# Patient Record
Sex: FEMALE | Race: WHITE | NOT HISPANIC OR LATINO | URBAN - METROPOLITAN AREA
[De-identification: names, ages, dates, MRNs, and addresses within clinical notes are randomized per-mention and may not be internally consistent; named-entity substitution may affect disease eponyms.]

---

## 2017-06-28 ENCOUNTER — OUTPATIENT (OUTPATIENT)
Dept: OUTPATIENT SERVICES | Facility: HOSPITAL | Age: 27
LOS: 1 days | Discharge: HOME | End: 2017-06-28

## 2017-06-28 ENCOUNTER — APPOINTMENT (OUTPATIENT)
Age: 27
End: 2017-06-28

## 2017-06-28 VITALS
HEIGHT: 62 IN | WEIGHT: 202 LBS | BODY MASS INDEX: 37.17 KG/M2 | DIASTOLIC BLOOD PRESSURE: 60 MMHG | SYSTOLIC BLOOD PRESSURE: 117 MMHG | HEART RATE: 67 BPM | TEMPERATURE: 97.9 F

## 2017-06-28 DIAGNOSIS — E03.9 HYPOTHYROIDISM, UNSPECIFIED: ICD-10-CM

## 2017-06-28 DIAGNOSIS — K44.9 DIAPHRAGMATIC HERNIA W/OUT OBSTRUCTION OR GANGRENE: ICD-10-CM

## 2017-06-28 DIAGNOSIS — R21 RASH AND OTHER NONSPECIFIC SKIN ERUPTION: ICD-10-CM

## 2017-06-28 DIAGNOSIS — E11.9 TYPE 2 DIABETES MELLITUS WITHOUT COMPLICATIONS: ICD-10-CM

## 2017-06-28 DIAGNOSIS — Z82.49 FAMILY HISTORY OF ISCHEMIC HEART DISEASE AND OTHER DISEASES OF THE CIRCULATORY SYSTEM: ICD-10-CM

## 2017-06-28 DIAGNOSIS — E66.9 OBESITY, UNSPECIFIED: ICD-10-CM

## 2017-06-28 DIAGNOSIS — Z23 ENCOUNTER FOR IMMUNIZATION: ICD-10-CM

## 2017-07-11 LAB
BASOPHILS # BLD: 0.04 TH/MM3
BASOPHILS NFR BLD: 0.6 %
CHOLEST SERPL-MCNC: 182 MG/DL
EOSINOPHIL # BLD: 0.14 TH/MM3
EOSINOPHIL NFR BLD: 2 %
ERYTHROCYTE [DISTWIDTH] IN BLOOD BY AUTOMATED COUNT: 12.2 %
ESTIMATED AVERAGE GLUCOSE (SOUTH): 137 MG/DL
GRANULOCYTES # BLD: 3.51 TH/MM3
GRANULOCYTES NFR BLD: 49.1 %
HBA1C MFR BLD: 6.4 %
HCT VFR BLD AUTO: 48.3 %
HDLC SERPL-MCNC: 33 MG/DL
HDLC SERPL: 5.5
HGB BLD-MCNC: 15.5 G/DL
IMM GRANULOCYTES # BLD: 0.01 TH/MM3
IMM GRANULOCYTES NFR BLD: 0.1 %
LDLC SERPL DIRECT ASSAY-MCNC: 121 MG/DL
LITHIUM SERPL-SCNC: 0.73 MMOL/L
LYMPHOCYTES # BLD: 2.64 TH/MM3
LYMPHOCYTES NFR BLD: 36.9 %
MCH RBC QN AUTO: 30.6 PG
MCHC RBC AUTO-ENTMCNC: 32.1 G/DL
MCV RBC AUTO: 95.5 FL
MONOCYTES # BLD: 0.81 TH/MM3
MONOCYTES NFR BLD: 11.3 %
PLATELET # BLD: 208 TH/MM3
PMV BLD AUTO: 11.4 FL
RBC # BLD AUTO: 5.06 ML/MM3
TRIGL SERPL-MCNC: 115 MG/DL
VALPROATE SERPL-MCNC: 73 UG/ML
VLDLC SERPL-MCNC: 23 MG/DL
WBC # BLD: 7.15 TH/MM3

## 2017-07-12 LAB
ALBUMIN SERPL-MCNC: 3.6 G/DL
ALBUMIN/GLOB SERPL: 1.03
ALP SERPL-CCNC: 42 IU/L
ALT SERPL-CCNC: 12 IU/L
ANION GAP SERPL CALC-SCNC: 7 MMOL/L
AST SERPL-CCNC: 17 IU/L
BILIRUB SERPL-MCNC: 0.6 MG/DL
BUN SERPL-MCNC: 17 MG/DL
BUN/CREAT SERPL: 15.6 %
CALCIUM SERPL-MCNC: 9.7 MG/DL
CHLORIDE SERPL-SCNC: 107 MMOL/L
CO2 SERPL-SCNC: 25 MMOL/L
CREAT SERPL-MCNC: 1.09 MG/DL
GFR SERPL CREATININE-BSD FRML MDRD: 60
GLUCOSE SERPL-MCNC: 128 MG/DL
POTASSIUM SERPL-SCNC: 4.1 MMOL/L
PROT SERPL-MCNC: 7.1 G/DL
SODIUM SERPL-SCNC: 139 MMOL/L

## 2017-07-13 ENCOUNTER — APPOINTMENT (OUTPATIENT)
Age: 27
End: 2017-07-13

## 2017-07-13 ENCOUNTER — OUTPATIENT (OUTPATIENT)
Dept: OUTPATIENT SERVICES | Facility: HOSPITAL | Age: 27
LOS: 1 days | Discharge: HOME | End: 2017-07-13

## 2017-07-13 DIAGNOSIS — R21 RASH AND OTHER NONSPECIFIC SKIN ERUPTION: ICD-10-CM

## 2017-07-13 DIAGNOSIS — E11.9 TYPE 2 DIABETES MELLITUS WITHOUT COMPLICATIONS: ICD-10-CM

## 2017-07-13 DIAGNOSIS — E03.9 HYPOTHYROIDISM, UNSPECIFIED: ICD-10-CM

## 2017-07-13 DIAGNOSIS — K59.09 OTHER CONSTIPATION: ICD-10-CM

## 2017-07-13 DIAGNOSIS — E66.9 OBESITY, UNSPECIFIED: ICD-10-CM

## 2017-07-13 LAB
ANA TITR SER: NEGATIVE
CRP SERPL-MCNC: < 0.2 MG/DL
HCV AB S/CO SERPL IA: 0.11 S/CO
HCV AB SER QL: NONREACTIVE
THYROID STIM HORM-HS 3RD GEN (SOUTH): 4.71 UIU/ML

## 2017-07-16 LAB — TOPIRAMATE SERPL-MCNC: 6.8 MCG/ML

## 2017-07-27 ENCOUNTER — OUTPATIENT (OUTPATIENT)
Dept: OUTPATIENT SERVICES | Facility: HOSPITAL | Age: 27
LOS: 1 days | Discharge: HOME | End: 2017-07-27

## 2017-07-27 ENCOUNTER — APPOINTMENT (OUTPATIENT)
Age: 27
End: 2017-07-27

## 2017-07-27 VITALS
SYSTOLIC BLOOD PRESSURE: 122 MMHG | BODY MASS INDEX: 36.44 KG/M2 | TEMPERATURE: 96.8 F | HEIGHT: 62 IN | HEART RATE: 68 BPM | DIASTOLIC BLOOD PRESSURE: 78 MMHG | WEIGHT: 198 LBS

## 2017-07-27 DIAGNOSIS — E11.9 TYPE 2 DIABETES MELLITUS WITHOUT COMPLICATIONS: ICD-10-CM

## 2017-07-27 DIAGNOSIS — E03.9 HYPOTHYROIDISM, UNSPECIFIED: ICD-10-CM

## 2017-07-27 DIAGNOSIS — E78.00 PURE HYPERCHOLESTEROLEMIA, UNSPECIFIED: ICD-10-CM

## 2017-09-25 ENCOUNTER — RX RENEWAL (OUTPATIENT)
Age: 27
End: 2017-09-25

## 2017-10-05 ENCOUNTER — APPOINTMENT (OUTPATIENT)
Dept: RHEUMATOLOGY | Facility: CLINIC | Age: 27
End: 2017-10-05

## 2017-10-05 ENCOUNTER — OUTPATIENT (OUTPATIENT)
Dept: OUTPATIENT SERVICES | Facility: HOSPITAL | Age: 27
LOS: 1 days | Discharge: HOME | End: 2017-10-05

## 2017-10-05 VITALS
DIASTOLIC BLOOD PRESSURE: 102 MMHG | WEIGHT: 192 LBS | SYSTOLIC BLOOD PRESSURE: 143 MMHG | HEIGHT: 62 IN | BODY MASS INDEX: 35.33 KG/M2 | HEART RATE: 75 BPM

## 2017-10-05 DIAGNOSIS — I73.00 RAYNAUD'S SYNDROME WITHOUT GANGRENE: ICD-10-CM

## 2017-10-11 ENCOUNTER — RESULT REVIEW (OUTPATIENT)
Age: 27
End: 2017-10-11

## 2017-10-11 ENCOUNTER — OUTPATIENT (OUTPATIENT)
Dept: OUTPATIENT SERVICES | Facility: HOSPITAL | Age: 27
LOS: 1 days | Discharge: HOME | End: 2017-10-11

## 2017-10-11 ENCOUNTER — APPOINTMENT (OUTPATIENT)
Age: 27
End: 2017-10-11

## 2017-10-11 VITALS
TEMPERATURE: 98.2 F | SYSTOLIC BLOOD PRESSURE: 138 MMHG | DIASTOLIC BLOOD PRESSURE: 82 MMHG | BODY MASS INDEX: 34.02 KG/M2 | WEIGHT: 192 LBS | HEART RATE: 68 BPM | HEIGHT: 63 IN

## 2017-10-11 DIAGNOSIS — E03.9 HYPOTHYROIDISM, UNSPECIFIED: ICD-10-CM

## 2017-10-11 DIAGNOSIS — K59.00 CONSTIPATION, UNSPECIFIED: ICD-10-CM

## 2017-10-11 DIAGNOSIS — E66.9 OBESITY, UNSPECIFIED: ICD-10-CM

## 2017-10-11 DIAGNOSIS — Z23 ENCOUNTER FOR IMMUNIZATION: ICD-10-CM

## 2017-10-11 DIAGNOSIS — R21 RASH AND OTHER NONSPECIFIC SKIN ERUPTION: ICD-10-CM

## 2017-10-11 DIAGNOSIS — E78.00 PURE HYPERCHOLESTEROLEMIA, UNSPECIFIED: ICD-10-CM

## 2017-10-11 DIAGNOSIS — E11.9 TYPE 2 DIABETES MELLITUS WITHOUT COMPLICATIONS: ICD-10-CM

## 2017-10-11 LAB
ALBUMIN SERPL-MCNC: 3.7 G/DL
ALBUMIN/GLOB SERPL: 1.23
ALP SERPL-CCNC: 50 IU/L
ALT SERPL-CCNC: 14 IU/L
ANION GAP SERPL CALC-SCNC: 7 MMOL/L
AST SERPL-CCNC: 14 IU/L
BILIRUB SERPL-MCNC: 0.5 MG/DL
BUN SERPL-MCNC: 10 MG/DL
BUN/CREAT SERPL: 10.9 %
CALCIUM SERPL-MCNC: 9.2 MG/DL
CHLORIDE SERPL-SCNC: 107 MMOL/L
CO2 SERPL-SCNC: 23 MMOL/L
CREAT SERPL-MCNC: 0.92 MG/DL
GFR SERPL CREATININE-BSD FRML MDRD: 73
GLUCOSE SERPL-MCNC: 138 MG/DL
POTASSIUM SERPL-SCNC: 4.2 MMOL/L
PROT SERPL-MCNC: 6.7 G/DL
SODIUM SERPL-SCNC: 137 MMOL/L

## 2017-10-12 ENCOUNTER — RX RENEWAL (OUTPATIENT)
Age: 27
End: 2017-10-12

## 2017-10-12 LAB
RHEUMATOID FACT SERPL-ACNC: < 14 IU/ML
THYROID STIM HORM-HS 3RD GEN (SOUTH): 0.01 UIU/ML

## 2017-10-25 ENCOUNTER — OUTPATIENT (OUTPATIENT)
Dept: OUTPATIENT SERVICES | Facility: HOSPITAL | Age: 27
LOS: 1 days | Discharge: HOME | End: 2017-10-25

## 2017-10-25 DIAGNOSIS — Z01.20 ENCOUNTER FOR DENTAL EXAMINATION AND CLEANING WITHOUT ABNORMAL FINDINGS: ICD-10-CM

## 2017-11-08 ENCOUNTER — APPOINTMENT (OUTPATIENT)
Dept: OBGYN | Facility: HOSPITAL | Age: 27
End: 2017-11-08

## 2017-11-08 ENCOUNTER — OUTPATIENT (OUTPATIENT)
Dept: OUTPATIENT SERVICES | Facility: HOSPITAL | Age: 27
LOS: 1 days | Discharge: HOME | End: 2017-11-08

## 2017-11-08 VITALS
DIASTOLIC BLOOD PRESSURE: 70 MMHG | HEIGHT: 63 IN | SYSTOLIC BLOOD PRESSURE: 118 MMHG | HEART RATE: 72 BPM | TEMPERATURE: 99.1 F | WEIGHT: 193 LBS | BODY MASS INDEX: 34.2 KG/M2

## 2017-11-09 DIAGNOSIS — Z01.419 ENCOUNTER FOR GYNECOLOGICAL EXAMINATION (GENERAL) (ROUTINE) WITHOUT ABNORMAL FINDINGS: ICD-10-CM

## 2017-11-11 ENCOUNTER — RESULT REVIEW (OUTPATIENT)
Age: 27
End: 2017-11-11

## 2017-12-05 ENCOUNTER — APPOINTMENT (OUTPATIENT)
Age: 27
End: 2017-12-05

## 2017-12-05 ENCOUNTER — OUTPATIENT (OUTPATIENT)
Dept: OUTPATIENT SERVICES | Facility: HOSPITAL | Age: 27
LOS: 1 days | Discharge: HOME | End: 2017-12-05

## 2017-12-05 VITALS
SYSTOLIC BLOOD PRESSURE: 116 MMHG | HEIGHT: 63 IN | HEART RATE: 64 BPM | WEIGHT: 184 LBS | BODY MASS INDEX: 32.6 KG/M2 | TEMPERATURE: 98.1 F | DIASTOLIC BLOOD PRESSURE: 70 MMHG

## 2017-12-05 DIAGNOSIS — R05 COUGH: ICD-10-CM

## 2017-12-05 RX ORDER — AZITHROMYCIN 250 MG/1
250 TABLET, FILM COATED ORAL
Qty: 6 | Refills: 0 | Status: COMPLETED | COMMUNITY
Start: 2017-12-05 | End: 2017-12-10

## 2017-12-07 ENCOUNTER — OUTPATIENT (OUTPATIENT)
Dept: OUTPATIENT SERVICES | Facility: HOSPITAL | Age: 27
LOS: 1 days | Discharge: HOME | End: 2017-12-07

## 2017-12-07 ENCOUNTER — APPOINTMENT (OUTPATIENT)
Dept: RHEUMATOLOGY | Facility: CLINIC | Age: 27
End: 2017-12-07

## 2017-12-07 VITALS
DIASTOLIC BLOOD PRESSURE: 89 MMHG | HEIGHT: 63 IN | WEIGHT: 183 LBS | BODY MASS INDEX: 32.43 KG/M2 | HEART RATE: 84 BPM | SYSTOLIC BLOOD PRESSURE: 116 MMHG

## 2017-12-14 ENCOUNTER — RX RENEWAL (OUTPATIENT)
Age: 27
End: 2017-12-14

## 2018-01-17 ENCOUNTER — OUTPATIENT (OUTPATIENT)
Dept: OUTPATIENT SERVICES | Facility: HOSPITAL | Age: 28
LOS: 1 days | Discharge: HOME | End: 2018-01-17

## 2018-01-17 ENCOUNTER — APPOINTMENT (OUTPATIENT)
Age: 28
End: 2018-01-17

## 2018-01-17 VITALS
HEIGHT: 62.5 IN | TEMPERATURE: 98.2 F | HEART RATE: 74 BPM | SYSTOLIC BLOOD PRESSURE: 100 MMHG | BODY MASS INDEX: 33.01 KG/M2 | WEIGHT: 184 LBS | DIASTOLIC BLOOD PRESSURE: 72 MMHG

## 2018-01-17 DIAGNOSIS — E78.00 PURE HYPERCHOLESTEROLEMIA, UNSPECIFIED: ICD-10-CM

## 2018-01-17 DIAGNOSIS — R05 COUGH: ICD-10-CM

## 2018-01-17 DIAGNOSIS — K59.00 CONSTIPATION, UNSPECIFIED: ICD-10-CM

## 2018-01-17 DIAGNOSIS — E03.9 HYPOTHYROIDISM, UNSPECIFIED: ICD-10-CM

## 2018-01-17 DIAGNOSIS — E11.9 TYPE 2 DIABETES MELLITUS WITHOUT COMPLICATIONS: ICD-10-CM

## 2018-01-17 DIAGNOSIS — E66.9 OBESITY, UNSPECIFIED: ICD-10-CM

## 2018-01-30 ENCOUNTER — OUTPATIENT (OUTPATIENT)
Dept: OUTPATIENT SERVICES | Facility: HOSPITAL | Age: 28
LOS: 1 days | Discharge: HOME | End: 2018-01-30

## 2018-01-31 DIAGNOSIS — H52.13 MYOPIA, BILATERAL: ICD-10-CM

## 2018-01-31 DIAGNOSIS — E11.9 TYPE 2 DIABETES MELLITUS WITHOUT COMPLICATIONS: ICD-10-CM

## 2018-02-14 LAB
ESTIMATED AVERAGE GLUCOSE: 117 MG/DL — HIGH (ref 68–114)
HBA1C BLD-MCNC: 5.7 % — HIGH (ref 4–5.6)

## 2018-02-15 LAB
BASOPHILS # BLD AUTO: 0.09 K/UL — SIGNIFICANT CHANGE UP (ref 0–0.2)
BASOPHILS NFR BLD AUTO: 1 % — SIGNIFICANT CHANGE UP (ref 0–2)
EOSINOPHIL # BLD AUTO: 0.2 K/UL — SIGNIFICANT CHANGE UP (ref 0–0.5)
EOSINOPHIL NFR BLD AUTO: 2.3 % — SIGNIFICANT CHANGE UP (ref 0–6)
HCT VFR BLD CALC: 50.5 % — HIGH (ref 34.5–45)
HGB BLD-MCNC: 15.7 G/DL — HIGH (ref 11.5–15.5)
IMM GRANULOCYTES NFR BLD AUTO: 0.3 % — SIGNIFICANT CHANGE UP (ref 0–1.5)
LYMPHOCYTES # BLD AUTO: 2.61 K/UL — SIGNIFICANT CHANGE UP (ref 1–3.3)
LYMPHOCYTES # BLD AUTO: 29.8 % — SIGNIFICANT CHANGE UP (ref 13–44)
MCHC RBC-ENTMCNC: 30.1 PG — SIGNIFICANT CHANGE UP (ref 27–34)
MCHC RBC-ENTMCNC: 31.1 GM/DL — LOW (ref 32–36)
MCV RBC AUTO: 96.7 FL — SIGNIFICANT CHANGE UP (ref 80–100)
MONOCYTES # BLD AUTO: 0.67 K/UL — SIGNIFICANT CHANGE UP (ref 0–0.9)
MONOCYTES NFR BLD AUTO: 7.7 % — SIGNIFICANT CHANGE UP (ref 2–14)
NEUTROPHILS # BLD AUTO: 5.15 K/UL — SIGNIFICANT CHANGE UP (ref 1.8–7.4)
NEUTROPHILS NFR BLD AUTO: 58.9 % — SIGNIFICANT CHANGE UP (ref 43–77)
PLATELET # BLD AUTO: 246 K/UL — SIGNIFICANT CHANGE UP (ref 150–400)
RBC # BLD: 5.22 M/UL — HIGH (ref 3.8–5.2)
RBC # FLD: 13.2 % — SIGNIFICANT CHANGE UP (ref 10.3–14.5)
TSH SERPL-MCNC: 0.19 UIU/ML — LOW (ref 0.27–4.2)
WBC # BLD: 8.75 K/UL — SIGNIFICANT CHANGE UP (ref 3.8–10.5)
WBC # FLD AUTO: 8.75 K/UL — SIGNIFICANT CHANGE UP (ref 3.8–10.5)

## 2018-02-16 LAB
ALBUMIN SERPL ELPH-MCNC: 4.4 G/DL — SIGNIFICANT CHANGE UP (ref 3.3–5)
ALP SERPL-CCNC: 63 U/L — SIGNIFICANT CHANGE UP (ref 40–120)
ALT FLD-CCNC: 9 U/L — LOW (ref 10–45)
ANION GAP SERPL CALC-SCNC: 18 MMOL/L — HIGH (ref 5–17)
AST SERPL-CCNC: 12 U/L — SIGNIFICANT CHANGE UP (ref 10–40)
BILIRUB SERPL-MCNC: 0.4 MG/DL — SIGNIFICANT CHANGE UP (ref 0.2–1.2)
BUN SERPL-MCNC: 12 MG/DL — SIGNIFICANT CHANGE UP (ref 7–23)
CALCIUM SERPL-MCNC: 10.1 MG/DL — SIGNIFICANT CHANGE UP (ref 8.4–10.5)
CHLORIDE SERPL-SCNC: 105 MMOL/L — SIGNIFICANT CHANGE UP (ref 96–108)
CO2 SERPL-SCNC: 20 MMOL/L — LOW (ref 22–31)
CREAT ?TM UR-MCNC: 24 MG/DL — SIGNIFICANT CHANGE UP
CREAT SERPL-MCNC: 0.88 MG/DL — SIGNIFICANT CHANGE UP (ref 0.5–1.3)
GLUCOSE SERPL-MCNC: 102 MG/DL — HIGH (ref 70–99)
MICROALBUMIN UR-MCNC: <0.3 MG/DL — SIGNIFICANT CHANGE UP
MICROALBUMIN/CREAT UR-RTO: SIGNIFICANT CHANGE UP (ref 0–30)
POTASSIUM SERPL-MCNC: 4.2 MMOL/L — SIGNIFICANT CHANGE UP (ref 3.5–5.3)
POTASSIUM SERPL-SCNC: 4.2 MMOL/L — SIGNIFICANT CHANGE UP (ref 3.5–5.3)
PROT SERPL-MCNC: 7.6 G/DL — SIGNIFICANT CHANGE UP (ref 6–8.3)
SODIUM SERPL-SCNC: 143 MMOL/L — SIGNIFICANT CHANGE UP (ref 135–145)

## 2018-03-08 ENCOUNTER — OUTPATIENT (OUTPATIENT)
Dept: OUTPATIENT SERVICES | Facility: HOSPITAL | Age: 28
LOS: 1 days | Discharge: HOME | End: 2018-03-08

## 2018-03-08 ENCOUNTER — APPOINTMENT (OUTPATIENT)
Age: 28
End: 2018-03-08

## 2018-03-08 VITALS
BODY MASS INDEX: 33.86 KG/M2 | SYSTOLIC BLOOD PRESSURE: 104 MMHG | WEIGHT: 184 LBS | HEIGHT: 62 IN | HEART RATE: 64 BPM | TEMPERATURE: 99.1 F | DIASTOLIC BLOOD PRESSURE: 76 MMHG

## 2018-03-08 DIAGNOSIS — E78.00 PURE HYPERCHOLESTEROLEMIA, UNSPECIFIED: ICD-10-CM

## 2018-03-08 DIAGNOSIS — E03.9 HYPOTHYROIDISM, UNSPECIFIED: ICD-10-CM

## 2018-03-08 DIAGNOSIS — E11.9 TYPE 2 DIABETES MELLITUS WITHOUT COMPLICATIONS: ICD-10-CM

## 2018-03-08 DIAGNOSIS — E66.9 OBESITY, UNSPECIFIED: ICD-10-CM

## 2018-03-08 RX ORDER — DAPAGLIFLOZIN AND METFORMIN HYDROCHLORIDE 5; 1000 MG/1; MG/1
5-1000 TABLET, FILM COATED, EXTENDED RELEASE ORAL
Qty: 30 | Refills: 2 | Status: DISCONTINUED | COMMUNITY
End: 2018-03-08

## 2018-03-29 LAB
ESTIMATED AVERAGE GLUCOSE: 123 MG/DL
HBA1C MFR BLD HPLC: 5.9 %

## 2018-04-01 LAB
CHOLEST SERPL-MCNC: 140 MG/DL
CHOLEST/HDLC SERPL: 3.3 RATIO
HDLC SERPL-MCNC: 42 MG/DL
LDLC SERPL CALC-MCNC: 88 MG/DL
TRIGL SERPL-MCNC: 78 MG/DL
TSH SERPL-ACNC: 2.46 UIU/ML

## 2018-05-07 ENCOUNTER — OUTPATIENT (OUTPATIENT)
Dept: OUTPATIENT SERVICES | Facility: HOSPITAL | Age: 28
LOS: 1 days | Discharge: HOME | End: 2018-05-07

## 2018-05-07 DIAGNOSIS — E78.2 MIXED HYPERLIPIDEMIA: ICD-10-CM

## 2018-05-07 DIAGNOSIS — E53.8 DEFICIENCY OF OTHER SPECIFIED B GROUP VITAMINS: ICD-10-CM

## 2018-05-07 DIAGNOSIS — I10 ESSENTIAL (PRIMARY) HYPERTENSION: ICD-10-CM

## 2018-05-07 DIAGNOSIS — D53.9 NUTRITIONAL ANEMIA, UNSPECIFIED: ICD-10-CM

## 2018-05-07 DIAGNOSIS — E11.65 TYPE 2 DIABETES MELLITUS WITH HYPERGLYCEMIA: ICD-10-CM

## 2018-05-07 DIAGNOSIS — E66.09 OTHER OBESITY DUE TO EXCESS CALORIES: ICD-10-CM

## 2018-05-07 DIAGNOSIS — E06.3 AUTOIMMUNE THYROIDITIS: ICD-10-CM

## 2018-05-07 DIAGNOSIS — E55.9 VITAMIN D DEFICIENCY, UNSPECIFIED: ICD-10-CM

## 2018-05-29 ENCOUNTER — RX RENEWAL (OUTPATIENT)
Age: 28
End: 2018-05-29

## 2018-06-21 ENCOUNTER — RX RENEWAL (OUTPATIENT)
Age: 28
End: 2018-06-21

## 2018-06-27 ENCOUNTER — RX RENEWAL (OUTPATIENT)
Age: 28
End: 2018-06-27

## 2018-07-17 ENCOUNTER — OUTPATIENT (OUTPATIENT)
Dept: OUTPATIENT SERVICES | Facility: HOSPITAL | Age: 28
LOS: 1 days | Discharge: HOME | End: 2018-07-17

## 2018-07-17 ENCOUNTER — APPOINTMENT (OUTPATIENT)
Age: 28
End: 2018-07-17

## 2018-07-17 VITALS
HEART RATE: 72 BPM | HEIGHT: 63 IN | WEIGHT: 182 LBS | DIASTOLIC BLOOD PRESSURE: 74 MMHG | SYSTOLIC BLOOD PRESSURE: 100 MMHG | TEMPERATURE: 98.6 F | BODY MASS INDEX: 32.25 KG/M2

## 2018-07-17 DIAGNOSIS — Z00.00 ENCOUNTER FOR GENERAL ADULT MEDICAL EXAMINATION WITHOUT ABNORMAL FINDINGS: ICD-10-CM

## 2018-07-17 DIAGNOSIS — E03.9 HYPOTHYROIDISM, UNSPECIFIED: ICD-10-CM

## 2018-07-17 DIAGNOSIS — E78.00 PURE HYPERCHOLESTEROLEMIA, UNSPECIFIED: ICD-10-CM

## 2018-07-17 DIAGNOSIS — K59.00 CONSTIPATION, UNSPECIFIED: ICD-10-CM

## 2018-07-17 DIAGNOSIS — E66.9 OBESITY, UNSPECIFIED: ICD-10-CM

## 2018-07-17 DIAGNOSIS — E11.9 TYPE 2 DIABETES MELLITUS WITHOUT COMPLICATIONS: ICD-10-CM

## 2018-07-17 NOTE — PHYSICAL EXAM
[No Acute Distress] : no acute distress [Well Nourished] : well nourished [Well Developed] : well developed [Well-Appearing] : well-appearing [Normal Sclera/Conjunctiva] : normal sclera/conjunctiva [PERRL] : pupils equal round and reactive to light [EOMI] : extraocular movements intact [Normal Outer Ear/Nose] : the outer ears and nose were normal in appearance [Normal Oropharynx] : the oropharynx was normal [Normal TMs] : both tympanic membranes were normal [Normal Nasal Mucosa] : the nasal mucosa was normal [No JVD] : no jugular venous distention [Supple] : supple [No Lymphadenopathy] : no lymphadenopathy [No Respiratory Distress] : no respiratory distress  [Clear to Auscultation] : lungs were clear to auscultation bilaterally [No Accessory Muscle Use] : no accessory muscle use [Normal Rate] : normal rate  [Regular Rhythm] : with a regular rhythm [Normal S1, S2] : normal S1 and S2 [No Murmur] : no murmur heard [No Carotid Bruits] : no carotid bruits [No Abdominal Bruit] : a ~M bruit was not heard ~T in the abdomen [Pedal Pulses Present] : the pedal pulses are present [No Extremity Clubbing/Cyanosis] : no extremity clubbing/cyanosis [No Palpable Aorta] : no palpable aorta [Soft] : abdomen soft [Non Tender] : non-tender [Non-distended] : non-distended [No Masses] : no abdominal mass palpated [No HSM] : no HSM [Normal Bowel Sounds] : normal bowel sounds [Normal Posterior Cervical Nodes] : no posterior cervical lymphadenopathy [Normal Anterior Cervical Nodes] : no anterior cervical lymphadenopathy [No CVA Tenderness] : no CVA  tenderness [No Spinal Tenderness] : no spinal tenderness [No Joint Swelling] : no joint swelling [Grossly Normal Strength/Tone] : grossly normal strength/tone [No Rash] : no rash [No Skin Lesions] : no skin lesions [Normal Gait] : normal gait [Acne] : no acne [de-identified] : obese abdomen [de-identified] : unable to exam

## 2018-07-17 NOTE — COUNSELING
[Weight management counseling provided] : Weight management [Healthy eating counseling provided] : healthy eating [Activity counseling provided] : activity [Low Fat Diet] : Low fat diet [Decrease Portions] : Decrease food portions [Low Salt Diet] : Low salt diet [Walking] : Walking [de-identified] : 1500 calories 2 gm, low fat/chol. high fiber ADA diet

## 2018-07-17 NOTE — HEALTH RISK ASSESSMENT
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

## 2018-07-17 NOTE — ASSESSMENT
[FreeTextEntry1] : 1.	Diet: 1500 calories 2 gm, low fat/chol. high fiber ADA diet\par 2.	Annual ophthal./optometry evaluation\par 3.	Annual dental evaluation\par 4.	Annual Ob/Gyn evaluation done 11/8/17, and pap smear done 11/11/17\par 5.	fasting CMP, CBC, lipid profile, TSH, valproic acid, lithium, HgA1C, topiramate\par 6.	urine microalbumin\par 7.	EKG ordered\par 8.	Pt. is a non-smoker, BMI 32.24, followed by dietitian at group home\par 9.	Had Tdap 6/28/17, flu vaccine 10/11/17\par 10.	Depression screening: unable to assess pt. for depression due to pt’s cognitive impairment, followed by psych.\par 11.	Fall screening: no report fall for past year per staff, no unsteady/imbalance gait\par 12.	NIDDM/obesity: HgA1C 5.9 on lantus, metformin, nuedexta\par a.  continue med. and diet\par b.  weight loss, exercise as tolerated\par c.  followed dietitian at group home as scheduled\par d.  seen optometry 1/30/18\par e.  follow endo Dr. Zamudio as scheduled\par f.  follow up visit in 3 months\par 13.	Hypothyroidism: TSH 2.46 on levothyroxine 25mcg daily\par a.  continue current dose of levothyroxine\par 14.	Hypercholesterolemia: LDL 88, triglyceride 78 on pravastatin\par a.  continue pravastatin\par 15.	Constipation: on colace, miralax\par a.  hydration\par b.  high fiber diet\par c.  continue current med.\par 16.	Above plan was discussed with group home staff

## 2018-07-17 NOTE — HISTORY OF PRESENT ILLNESS
[Other: _____] : [unfilled] [FreeTextEntry1] : Pt. is a 29yo female, here for annual physical exam.  Pt. unable to provide any history or complaint.  No issues reported by staff.

## 2018-08-03 LAB
BASOPHILS # BLD AUTO: 0.08 K/UL
BASOPHILS NFR BLD AUTO: 1 %
EOSINOPHIL # BLD AUTO: 0.14 K/UL
EOSINOPHIL NFR BLD AUTO: 1.7 %
ESTIMATED AVERAGE GLUCOSE: 120 MG/DL
HBA1C MFR BLD HPLC: 5.8 %
HCT VFR BLD CALC: 45.9 %
HGB BLD-MCNC: 14.8 G/DL
IMM GRANULOCYTES NFR BLD AUTO: 0.6 %
LITHIUM SERPL-SCNC: 0.71 MMOL/L
LYMPHOCYTES # BLD AUTO: 2.4 K/UL
LYMPHOCYTES NFR BLD AUTO: 29.7 %
MAN DIFF?: NORMAL
MCHC RBC-ENTMCNC: 29.7 PG
MCHC RBC-ENTMCNC: 32.2 G/DL
MCV RBC AUTO: 92 FL
MONOCYTES # BLD AUTO: 0.66 K/UL
MONOCYTES NFR BLD AUTO: 8.2 %
NEUTROPHILS # BLD AUTO: 4.76 K/UL
NEUTROPHILS NFR BLD AUTO: 58.8 %
PLATELET # BLD AUTO: 188 K/UL
RBC # BLD: 4.99 M/UL
RBC # FLD: 12.1 %
VALPROATE SERPL-MCNC: 45 UG/ML
WBC # FLD AUTO: 8.09 K/UL

## 2018-08-06 LAB
ALBUMIN SERPL ELPH-MCNC: 4.3 G/DL
ALP BLD-CCNC: 44 U/L
ALT SERPL-CCNC: 11 U/L
ANION GAP SERPL CALC-SCNC: 17 MMOL/L
AST SERPL-CCNC: 12 U/L
BILIRUB SERPL-MCNC: 0.3 MG/DL
BUN SERPL-MCNC: 10 MG/DL
CALCIUM SERPL-MCNC: 9.3 MG/DL
CHLORIDE SERPL-SCNC: 106 MMOL/L
CHOLEST SERPL-MCNC: 117 MG/DL
CHOLEST/HDLC SERPL: 3 RATIO
CO2 SERPL-SCNC: 21 MMOL/L
CREAT SERPL-MCNC: 0.9 MG/DL
GLUCOSE SERPL-MCNC: 94 MG/DL
HDLC SERPL-MCNC: 39 MG/DL
LDLC SERPL CALC-MCNC: 69 MG/DL
POTASSIUM SERPL-SCNC: 4.3 MMOL/L
PROT SERPL-MCNC: 6.9 G/DL
SODIUM SERPL-SCNC: 144 MMOL/L
TOPIRAMATE SERPL-MCNC: 6.9 MCG/ML
TRIGL SERPL-MCNC: 73 MG/DL
TSH SERPL-ACNC: 3.96 UIU/ML

## 2018-09-25 ENCOUNTER — RX RENEWAL (OUTPATIENT)
Age: 28
End: 2018-09-25

## 2018-10-18 ENCOUNTER — RX RENEWAL (OUTPATIENT)
Age: 28
End: 2018-10-18

## 2018-10-25 ENCOUNTER — APPOINTMENT (OUTPATIENT)
Age: 28
End: 2018-10-25

## 2018-11-01 ENCOUNTER — APPOINTMENT (OUTPATIENT)
Age: 28
End: 2018-11-01

## 2018-11-01 ENCOUNTER — OUTPATIENT (OUTPATIENT)
Dept: OUTPATIENT SERVICES | Facility: HOSPITAL | Age: 28
LOS: 1 days | Discharge: HOME | End: 2018-11-01

## 2018-11-01 VITALS
DIASTOLIC BLOOD PRESSURE: 84 MMHG | HEIGHT: 63 IN | TEMPERATURE: 97.8 F | SYSTOLIC BLOOD PRESSURE: 110 MMHG | WEIGHT: 176 LBS | BODY MASS INDEX: 31.18 KG/M2 | HEART RATE: 76 BPM

## 2018-11-01 DIAGNOSIS — K59.00 CONSTIPATION, UNSPECIFIED: ICD-10-CM

## 2018-11-01 DIAGNOSIS — E03.9 HYPOTHYROIDISM, UNSPECIFIED: ICD-10-CM

## 2018-11-01 DIAGNOSIS — E11.9 TYPE 2 DIABETES MELLITUS WITHOUT COMPLICATIONS: ICD-10-CM

## 2018-11-01 DIAGNOSIS — Z23 ENCOUNTER FOR IMMUNIZATION: ICD-10-CM

## 2018-11-01 DIAGNOSIS — E78.00 PURE HYPERCHOLESTEROLEMIA, UNSPECIFIED: ICD-10-CM

## 2018-11-01 DIAGNOSIS — E66.9 OBESITY, UNSPECIFIED: ICD-10-CM

## 2018-11-01 NOTE — PHYSICAL EXAM
[No Respiratory Distress] : no respiratory distress  [Clear to Auscultation] : lungs were clear to auscultation bilaterally [No Accessory Muscle Use] : no accessory muscle use [Normal Rate] : normal rate  [Regular Rhythm] : with a regular rhythm [Normal S1, S2] : normal S1 and S2 [No Carotid Bruits] : no carotid bruits [No Abdominal Bruit] : a ~M bruit was not heard ~T in the abdomen [Pedal Pulses Present] : the pedal pulses are present [No Edema] : there was no peripheral edema [No Extremity Clubbing/Cyanosis] : no extremity clubbing/cyanosis [No Palpable Aorta] : no palpable aorta [Soft] : abdomen soft [Non Tender] : non-tender [No Masses] : no abdominal mass palpated [No HSM] : no HSM [Normal Bowel Sounds] : normal bowel sounds [No Hernias] : no hernias [No Joint Swelling] : no joint swelling [Grossly Normal Strength/Tone] : grossly normal strength/tone [de-identified] : Pt. screaming, and grabbing people with her hands [de-identified] : obese abdomen

## 2018-11-01 NOTE — ASSESSMENT
[FreeTextEntry1] : 01.  NIDDM/obesity: HgA1C 5.8 on lantus, nuedexta and metformin\par a.  continue current med. and diet\par b.  weight loss, exercise as tolerated\par c.  follow endo as scheduled\par 02.  Hypothyroidism: TSH 3.96 on levothyroxine 25mcg daily\par a.  continue levothyroxine\par b.  follow up visit in 3 months\par 03.  Hypercholesterolemia: LDL 69, triglyceride 73 on pravastatin\par a.  continue pravastatin\par 04.  Constipation: stable on colace, polyethylene\par a.  continue current med.\par b.  high fiber diet\par c.  hydration\par 05.  HCM\par a.  flu vaccine given

## 2018-11-01 NOTE — HISTORY OF PRESENT ILLNESS
[Other: _____] : [unfilled] [FreeTextEntry1] : Pt. is a 27yo female, here for follow up and medication renewal.  Pt. has behavior issues and is followed by psych.

## 2018-11-01 NOTE — COUNSELING
[Weight management counseling provided] : Weight management [Healthy eating counseling provided] : healthy eating [Activity counseling provided] : activity [Low Fat Diet] : Low fat diet [Decrease Portions] : Decrease food portions [Low Salt Diet] : Low salt diet [Walking] : Walking [de-identified] : 1500 calories 2 gm, low fat/chol. high fiber ADA diet

## 2018-11-05 LAB
CREAT SPEC-SCNC: 19 MG/DL
MICROALBUMIN 24H UR DL<=1MG/L-MCNC: <1.2 MG/DL
MICROALBUMIN/CREAT 24H UR-RTO: NORMAL

## 2018-12-05 ENCOUNTER — APPOINTMENT (OUTPATIENT)
Dept: OBGYN | Facility: HOSPITAL | Age: 28
End: 2018-12-05

## 2018-12-05 ENCOUNTER — OUTPATIENT (OUTPATIENT)
Dept: OUTPATIENT SERVICES | Facility: HOSPITAL | Age: 28
LOS: 1 days | Discharge: HOME | End: 2018-12-05

## 2018-12-05 VITALS
TEMPERATURE: 99 F | DIASTOLIC BLOOD PRESSURE: 74 MMHG | WEIGHT: 172 LBS | SYSTOLIC BLOOD PRESSURE: 100 MMHG | BODY MASS INDEX: 30.48 KG/M2 | HEIGHT: 63 IN | HEART RATE: 64 BPM

## 2018-12-20 ENCOUNTER — OUTPATIENT (OUTPATIENT)
Dept: OUTPATIENT SERVICES | Facility: HOSPITAL | Age: 28
LOS: 1 days | Discharge: HOME | End: 2018-12-20

## 2018-12-20 DIAGNOSIS — Z01.21 ENCOUNTER FOR DENTAL EXAMINATION AND CLEANING WITH ABNORMAL FINDINGS: ICD-10-CM

## 2019-01-29 ENCOUNTER — RX RENEWAL (OUTPATIENT)
Age: 29
End: 2019-01-29

## 2019-02-06 ENCOUNTER — OUTPATIENT (OUTPATIENT)
Dept: OUTPATIENT SERVICES | Facility: HOSPITAL | Age: 29
LOS: 1 days | Discharge: HOME | End: 2019-02-06

## 2019-02-06 ENCOUNTER — APPOINTMENT (OUTPATIENT)
Age: 29
End: 2019-02-06

## 2019-02-06 VITALS
SYSTOLIC BLOOD PRESSURE: 102 MMHG | WEIGHT: 165 LBS | HEART RATE: 72 BPM | BODY MASS INDEX: 29.23 KG/M2 | DIASTOLIC BLOOD PRESSURE: 70 MMHG | HEIGHT: 63 IN

## 2019-02-06 DIAGNOSIS — K59.00 CONSTIPATION, UNSPECIFIED: ICD-10-CM

## 2019-02-06 DIAGNOSIS — E11.9 TYPE 2 DIABETES MELLITUS WITHOUT COMPLICATIONS: ICD-10-CM

## 2019-02-06 DIAGNOSIS — E78.00 PURE HYPERCHOLESTEROLEMIA, UNSPECIFIED: ICD-10-CM

## 2019-02-06 DIAGNOSIS — E66.9 OBESITY, UNSPECIFIED: ICD-10-CM

## 2019-02-06 DIAGNOSIS — E03.9 HYPOTHYROIDISM, UNSPECIFIED: ICD-10-CM

## 2019-02-06 RX ORDER — DIVALPROEX SODIUM 250 MG/1
250 TABLET, DELAYED RELEASE ORAL
Refills: 0 | Status: DISCONTINUED | COMMUNITY
End: 2019-02-06

## 2019-02-06 RX ORDER — INSULIN GLARGINE 100 [IU]/ML
100 INJECTION, SOLUTION SUBCUTANEOUS
Qty: 1 | Refills: 2 | Status: DISCONTINUED | COMMUNITY
End: 2019-02-06

## 2019-02-06 NOTE — PHYSICAL EXAM
[No Respiratory Distress] : no respiratory distress  [Clear to Auscultation] : lungs were clear to auscultation bilaterally [No Accessory Muscle Use] : no accessory muscle use [Normal Rate] : normal rate  [Regular Rhythm] : with a regular rhythm [Normal S1, S2] : normal S1 and S2 [No Carotid Bruits] : no carotid bruits [No Abdominal Bruit] : a ~M bruit was not heard ~T in the abdomen [Pedal Pulses Present] : the pedal pulses are present [No Edema] : there was no peripheral edema [No Extremity Clubbing/Cyanosis] : no extremity clubbing/cyanosis [No Palpable Aorta] : no palpable aorta [Soft] : abdomen soft [Non Tender] : non-tender [No Masses] : no abdominal mass palpated [No HSM] : no HSM [Normal Bowel Sounds] : normal bowel sounds [No Hernias] : no hernias [No Joint Swelling] : no joint swelling [Grossly Normal Strength/Tone] : grossly normal strength/tone [de-identified] : obese abdomen

## 2019-02-06 NOTE — COUNSELING
[Weight management counseling provided] : Weight management [Healthy eating counseling provided] : healthy eating [Activity counseling provided] : activity [Low Fat Diet] : Low fat diet [Decrease Portions] : Decrease food portions [Low Salt Diet] : Low salt diet [Walking] : Walking [de-identified] : 1500 calories 2 gm, low fat/chol. high fiber ADA diet

## 2019-02-06 NOTE — ASSESSMENT
[FreeTextEntry1] : 01.  NIDDM/obesity: on metformin, januvia; lost 7 lbs since 12/5/18\par a.  continue current meds.\par b.  weight loss, exercise as tolerated\par c.  optometry follow up\par d.  fasting CMP, CBC, lipid profile, HgA1C, TSH\par e.  urine microalbumin\par f.  follow up visit in 3 months\par 02.  Hypothyroidism: on levothyroxine\par a.  continue current dose of levothyroxine\par 03.  Hypercholesterolemia: on pravastatin\par a.  continue pravastatin\par 04.  Constipation: stable on colace and miralax\par a.  hydration\par b.  high fiber diet\par c.  continue miralax, and colace

## 2019-02-06 NOTE — HISTORY OF PRESENT ILLNESS
[Other: _____] : [unfilled] [FreeTextEntry1] : Pt. is a 28yo female, here for followup hypothyroidism, diabetes mellitus, hypercholesterolemia and constipation.  Pt. unable to provide any history or complaint.  Other than pt's behavior, no issues reported by staff.  Pt. was evaluated by psych and endocrinologist, psych meds. were adjusted, and endo added januvia to her regimen

## 2019-02-26 ENCOUNTER — OUTPATIENT (OUTPATIENT)
Dept: OUTPATIENT SERVICES | Facility: HOSPITAL | Age: 29
LOS: 1 days | Discharge: HOME | End: 2019-02-26

## 2019-02-26 DIAGNOSIS — H52.13 MYOPIA, BILATERAL: ICD-10-CM

## 2019-02-26 DIAGNOSIS — E11.9 TYPE 2 DIABETES MELLITUS WITHOUT COMPLICATIONS: ICD-10-CM

## 2019-03-11 ENCOUNTER — OUTPATIENT (OUTPATIENT)
Dept: OUTPATIENT SERVICES | Facility: HOSPITAL | Age: 29
LOS: 1 days | Discharge: HOME | End: 2019-03-11

## 2019-03-11 DIAGNOSIS — Z00.00 ENCOUNTER FOR GENERAL ADULT MEDICAL EXAMINATION WITHOUT ABNORMAL FINDINGS: ICD-10-CM

## 2019-03-12 LAB
ALBUMIN SERPL ELPH-MCNC: 4.3 G/DL
ALP BLD-CCNC: 46 U/L
ALT SERPL-CCNC: 10 U/L
ANION GAP SERPL CALC-SCNC: 13 MMOL/L
AST SERPL-CCNC: 13 U/L
BASOPHILS # BLD AUTO: 0.08 K/UL
BASOPHILS NFR BLD AUTO: 1 %
BILIRUB SERPL-MCNC: 0.4 MG/DL
BUN SERPL-MCNC: 15 MG/DL
CALCIUM SERPL-MCNC: 9.1 MG/DL
CHLORIDE SERPL-SCNC: 107 MMOL/L
CHOLEST SERPL-MCNC: 121 MG/DL
CHOLEST/HDLC SERPL: 3.6 RATIO
CO2 SERPL-SCNC: 22 MMOL/L
CREAT SERPL-MCNC: 0.9 MG/DL
EOSINOPHIL # BLD AUTO: 0.16 K/UL
EOSINOPHIL NFR BLD AUTO: 2.1 %
ESTIMATED AVERAGE GLUCOSE: 117 MG/DL
GLUCOSE SERPL-MCNC: 121 MG/DL
HBA1C MFR BLD HPLC: 5.7 %
HCT VFR BLD CALC: 45.4 %
HDLC SERPL-MCNC: 34 MG/DL
HGB BLD-MCNC: 14.5 G/DL
IMM GRANULOCYTES NFR BLD AUTO: 0.7 %
LDLC SERPL CALC-MCNC: 76 MG/DL
LYMPHOCYTES # BLD AUTO: 2.18 K/UL
LYMPHOCYTES NFR BLD AUTO: 28.6 %
MAN DIFF?: NORMAL
MCHC RBC-ENTMCNC: 30.3 PG
MCHC RBC-ENTMCNC: 31.9 G/DL
MCV RBC AUTO: 94.8 FL
MONOCYTES # BLD AUTO: 0.7 K/UL
MONOCYTES NFR BLD AUTO: 9.2 %
NEUTROPHILS # BLD AUTO: 4.45 K/UL
NEUTROPHILS NFR BLD AUTO: 58.4 %
PLATELET # BLD AUTO: 201 K/UL
POTASSIUM SERPL-SCNC: 4.5 MMOL/L
PROT SERPL-MCNC: 6.9 G/DL
RBC # BLD: 4.79 M/UL
RBC # FLD: 12.1 %
SODIUM SERPL-SCNC: 142 MMOL/L
TRIGL SERPL-MCNC: 68 MG/DL
TSH SERPL-ACNC: 3.79 UIU/ML
WBC # FLD AUTO: 7.62 K/UL

## 2019-05-21 ENCOUNTER — OUTPATIENT (OUTPATIENT)
Dept: OUTPATIENT SERVICES | Facility: HOSPITAL | Age: 29
LOS: 1 days | Discharge: HOME | End: 2019-05-21

## 2019-05-21 ENCOUNTER — APPOINTMENT (OUTPATIENT)
Age: 29
End: 2019-05-21

## 2019-05-21 VITALS
SYSTOLIC BLOOD PRESSURE: 102 MMHG | HEIGHT: 63 IN | DIASTOLIC BLOOD PRESSURE: 74 MMHG | RESPIRATION RATE: 16 BRPM | HEART RATE: 72 BPM | TEMPERATURE: 97.7 F | WEIGHT: 164 LBS | BODY MASS INDEX: 29.06 KG/M2

## 2019-05-21 DIAGNOSIS — E11.9 TYPE 2 DIABETES MELLITUS WITHOUT COMPLICATIONS: ICD-10-CM

## 2019-05-21 DIAGNOSIS — E78.00 PURE HYPERCHOLESTEROLEMIA, UNSPECIFIED: ICD-10-CM

## 2019-05-21 DIAGNOSIS — K59.00 CONSTIPATION, UNSPECIFIED: ICD-10-CM

## 2019-05-21 DIAGNOSIS — E66.9 OBESITY, UNSPECIFIED: ICD-10-CM

## 2019-05-21 DIAGNOSIS — E03.9 HYPOTHYROIDISM, UNSPECIFIED: ICD-10-CM

## 2019-05-21 NOTE — PHYSICAL EXAM
[No Respiratory Distress] : no respiratory distress  [Clear to Auscultation] : lungs were clear to auscultation bilaterally [No Accessory Muscle Use] : no accessory muscle use [Normal Rate] : normal rate  [Regular Rhythm] : with a regular rhythm [Normal S1, S2] : normal S1 and S2 [No Carotid Bruits] : no carotid bruits [No Abdominal Bruit] : a ~M bruit was not heard ~T in the abdomen [Pedal Pulses Present] : the pedal pulses are present [No Edema] : there was no peripheral edema [No Extremity Clubbing/Cyanosis] : no extremity clubbing/cyanosis [No Palpable Aorta] : no palpable aorta [Soft] : abdomen soft [Non Tender] : non-tender [No Masses] : no abdominal mass palpated [No HSM] : no HSM [Normal Bowel Sounds] : normal bowel sounds [No Hernias] : no hernias [No Joint Swelling] : no joint swelling [Grossly Normal Strength/Tone] : grossly normal strength/tone [de-identified] : obese abdomen

## 2019-05-21 NOTE — COUNSELING
[Weight management counseling provided] : Weight management [Healthy eating counseling provided] : healthy eating [Activity counseling provided] : activity [Low Fat Diet] : Low fat diet [Low Salt Diet] : Low salt diet [Decrease Portions] : Decrease food portions [Walking] : Walking [de-identified] : 1500 calories 2 gm, low fat/chol. high fiber ADA diet

## 2019-05-21 NOTE — HISTORY OF PRESENT ILLNESS
[Other: _____] : [unfilled] [FreeTextEntry1] : Pt. is a 28yo female, here for follow up NIDDM, hypothyroidism, hypercholesterolemia, constipation.  Pt. unable to provide any history or complaint.  No issues reported by staff.  Pt. seen endo, and levothyroxine dose increase to 75mcg daily as per group home RN, Deepti Alonzo

## 2019-06-07 ENCOUNTER — RECORD ABSTRACTING (OUTPATIENT)
Age: 29
End: 2019-06-07

## 2019-06-07 DIAGNOSIS — Z82.5 FAMILY HISTORY OF ASTHMA AND OTHER CHRONIC LOWER RESPIRATORY DISEASES: ICD-10-CM

## 2019-06-07 DIAGNOSIS — Z82.49 FAMILY HISTORY OF ISCHEMIC HEART DISEASE AND OTHER DISEASES OF THE CIRCULATORY SYSTEM: ICD-10-CM

## 2019-06-24 ENCOUNTER — OUTPATIENT (OUTPATIENT)
Dept: OUTPATIENT SERVICES | Facility: HOSPITAL | Age: 29
LOS: 1 days | Discharge: HOME | End: 2019-06-24

## 2019-06-24 DIAGNOSIS — Z01.20 ENCOUNTER FOR DENTAL EXAMINATION AND CLEANING WITHOUT ABNORMAL FINDINGS: ICD-10-CM

## 2019-07-11 ENCOUNTER — APPOINTMENT (OUTPATIENT)
Dept: ENDOCRINOLOGY | Facility: CLINIC | Age: 29
End: 2019-07-11
Payer: COMMERCIAL

## 2019-07-11 PROCEDURE — 99214 OFFICE O/P EST MOD 30 MIN: CPT

## 2019-07-11 NOTE — PHYSICAL EXAM
[Alert] : alert [No Acute Distress] : no acute distress [Well Developed] : well developed [Well Nourished] : well nourished [Healthy Appearance] : healthy appearance [Normal Sclera/Conjunctiva] : normal sclera/conjunctiva [EOMI] : extra ocular movement intact [PERRL] : pupils equal, round and reactive to light [Normal Outer Ear/Nose] : the ears and nose were normal in appearance [No Proptosis] : no proptosis [Normal Hearing] : hearing was normal [Normal Nasal Mucosa] : the nasal mucosa was normal [Normal Oropharynx] : the oropharynx was normal [Supple] : the neck was supple [No Neck Mass] : no neck mass was observed [Thyroid Not Enlarged] : the thyroid was not enlarged [No Thyroid Nodules] : there were no palpable thyroid nodules [No Respiratory Distress] : no respiratory distress [Clear to Auscultation] : lungs were clear to auscultation bilaterally [No Accessory Muscle Use] : no accessory muscle use [Normal Palpation] : palpation of the chest revealed no abnormalities [Normal to Percussion] : the lungs were normal to percussion [Normal Rate] : heart rate was normal  [Regular Rhythm] : with a regular rhythm [Normal S1, S2] : normal S1 and S2 [No Edema] : there was no peripheral edema [Pedal Pulses Normal] : the pedal pulses are present [Abdominal Aorta Normal] : the abdominal aorta was normal [Normal Bowel Sounds] : normal bowel sounds [Not Tender] : non-tender [Not Distended] : not distended [Post Cervical Nodes] : posterior cervical nodes [Soft] : abdomen soft [Axillary Nodes] : axillary nodes [Normal] : normal and non tender [Anterior Cervical Nodes] : anterior cervical nodes [No Spinal Tenderness] : no spinal tenderness [Spine Straight] : spine straight [Normal Gait] : normal gait [No Stigmata of Cushings Syndrome] : no stigmata of cushings syndrome [Normal Strength/Tone] : muscle strength and tone were normal [No Rash] : no rash [Acanthosis Nigricans] : no acanthosis nigricans [Normal Reflexes] : deep tendon reflexes were 2+ and symmetric [Oriented x3] : oriented to person, place, and time [No Tremors] : no tremors [de-identified] : obese [de-identified] : autism, busy watching her ipad, [facility] [employer].

## 2019-07-11 NOTE — ASSESSMENT
[FreeTextEntry1] : Patient glucose can be tested once a day in am and prn if needed. Continue diet and exercise and no change in medications. Refer to Podiatry and opthalmology annually [Carbohydrate Consistent Diet] : carbohydrate consistent diet [Hypoglycemia Management] : hypoglycemia management [Diabetes Foot Care] : diabetes foot care [Sick-Day Management] : sick-day management [Long Term Vascular Complications] : long term vascular complications of diabetes [Importance of Diet and Exercise] : importance of diet and exercise to improve glycemic control, achieve weight loss and improve cardiovascular health [Levothyroxine] : The patient was instructed to take Levothyroxine on an empty stomach, separate from vitamins, and wait at least 30 minutes before eating [Self Monitoring of Blood Glucose] : self monitoring of blood glucose

## 2019-07-11 NOTE — THERAPY
[Today's Date] : [unfilled] [BG Target = ____] : BG Target = [unfilled] [FreeTextEntry7] : Patient continue on Januvia, Metformin and  ADA diet an exercise

## 2019-07-17 ENCOUNTER — OUTPATIENT (OUTPATIENT)
Dept: OUTPATIENT SERVICES | Facility: HOSPITAL | Age: 29
LOS: 1 days | Discharge: HOME | End: 2019-07-17
Payer: COMMERCIAL

## 2019-07-17 PROCEDURE — 93923 UPR/LXTR ART STDY 3+ LVLS: CPT | Mod: 26

## 2019-07-19 DIAGNOSIS — I70.213 ATHEROSCLEROSIS OF NATIVE ARTERIES OF EXTREMITIES WITH INTERMITTENT CLAUDICATION, BILATERAL LEGS: ICD-10-CM

## 2019-08-15 ENCOUNTER — RX RENEWAL (OUTPATIENT)
Age: 29
End: 2019-08-15

## 2019-08-19 ENCOUNTER — APPOINTMENT (OUTPATIENT)
Dept: PEDIATRIC DEVELOPMENTAL SERVICES | Facility: HOSPITAL | Age: 29
End: 2019-08-19

## 2019-08-20 ENCOUNTER — RX RENEWAL (OUTPATIENT)
Age: 29
End: 2019-08-20

## 2019-09-24 ENCOUNTER — APPOINTMENT (OUTPATIENT)
Dept: PEDIATRIC DEVELOPMENTAL SERVICES | Facility: HOSPITAL | Age: 29
End: 2019-09-24

## 2019-09-24 ENCOUNTER — OUTPATIENT (OUTPATIENT)
Dept: OUTPATIENT SERVICES | Facility: HOSPITAL | Age: 29
LOS: 1 days | Discharge: HOME | End: 2019-09-24

## 2019-09-24 VITALS
SYSTOLIC BLOOD PRESSURE: 128 MMHG | WEIGHT: 167 LBS | HEIGHT: 63 IN | BODY MASS INDEX: 29.59 KG/M2 | RESPIRATION RATE: 16 BRPM | DIASTOLIC BLOOD PRESSURE: 78 MMHG | TEMPERATURE: 98.6 F | HEART RATE: 100 BPM

## 2019-09-24 RX ORDER — LORAZEPAM 0.5 MG/1
0.5 TABLET ORAL
Refills: 0 | Status: DISCONTINUED | COMMUNITY
End: 2019-09-24

## 2019-09-24 NOTE — HISTORY OF PRESENT ILLNESS
[Other: _____] : [unfilled] [FreeTextEntry1] : Pt. is a 28yo female, here for annual physical exam.  Pt. unable to provide any history or complaint due to her cognitive impairment.  No issues reported by staff.

## 2019-09-24 NOTE — HEALTH RISK ASSESSMENT
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

## 2019-09-24 NOTE — COUNSELING
[Potential consequences of obesity discussed] : Potential consequences of obesity discussed [Benefits of weight loss discussed] : Benefits of weight loss discussed [FreeTextEntry2] : 1500 calories 2 gm, low fat/chol. high fiber ADA diet

## 2019-09-24 NOTE — PHYSICAL EXAM
[Well Nourished] : well nourished [No Acute Distress] : no acute distress [Well-Appearing] : well-appearing [Well Developed] : well developed [Normal Sclera/Conjunctiva] : normal sclera/conjunctiva [PERRL] : pupils equal round and reactive to light [EOMI] : extraocular movements intact [Normal Outer Ear/Nose] : the outer ears and nose were normal in appearance [Normal Oropharynx] : the oropharynx was normal [Normal TMs] : both tympanic membranes were normal [No JVD] : no jugular venous distention [Normal Nasal Mucosa] : the nasal mucosa was normal [Supple] : supple [No Lymphadenopathy] : no lymphadenopathy [No Accessory Muscle Use] : no accessory muscle use [No Respiratory Distress] : no respiratory distress  [Normal Rate] : normal rate  [Clear to Auscultation] : lungs were clear to auscultation bilaterally [Regular Rhythm] : with a regular rhythm [Normal S1, S2] : normal S1 and S2 [No Abdominal Bruit] : a ~M bruit was not heard ~T in the abdomen [No Carotid Bruits] : no carotid bruits [Pedal Pulses Present] : the pedal pulses are present [No Varicosities] : no varicosities [No Edema] : there was no peripheral edema [No Palpable Aorta] : no palpable aorta [Soft] : abdomen soft [No Extremity Clubbing/Cyanosis] : no extremity clubbing/cyanosis [Non Tender] : non-tender [Non-distended] : non-distended [No HSM] : no HSM [No Masses] : no abdominal mass palpated [Normal Posterior Cervical Nodes] : no posterior cervical lymphadenopathy [Normal Bowel Sounds] : normal bowel sounds [Normal Anterior Cervical Nodes] : no anterior cervical lymphadenopathy [No CVA Tenderness] : no CVA  tenderness [No Spinal Tenderness] : no spinal tenderness [Grossly Normal Strength/Tone] : grossly normal strength/tone [No Joint Swelling] : no joint swelling [No Rash] : no rash [No Skin Lesions] : no skin lesions [Acne] : no acne [Normal Affect] : the affect was normal [Normal Insight/Judgement] : insight and judgment were intact [de-identified] : pt. follows simple instruction, has adequate hearing for her level of functioning [de-identified] : awake and alert

## 2019-09-24 NOTE — ASSESSMENT
[FreeTextEntry1] : 1.	Diet: 1500 calories 2 gm, low fat/chol. high fiber ADA diet\par 2.	Annual ophthal./optometry evaluation\par 3.	Annual dental evaluation\par 4.	Annual Ob/Gyn evaluation done 12/5/18\par 5.	fasting CMP, CBC, lipid profile, TSH, HgA1C, valproic acid\par 6.	urine microalbumin\par 7.	EKG ordered\par 8.	Pt. is a non-smoker, BMI 29.58, followed by dietitian at group home\par 9.	Had Tdap 6/28/17, last flu vaccine 11/1/18 \par 10.	Depression screening: unable to evaluate pt. for depression due to pt’s cognitive impairment, followed by psych\par 11.	Fall screening: no report fall for past year per staff, no unsteady/imbalance gait\par 12.	NIDDM/obesity: on januvia, nuedexta, metformin; gained 3 lbs since last visit\par a.  continue current meds\par b.  continue current diet\par c.  seen optometry 2/26/19\par d.  follow up endo as scheduled\par e.  follow up visit in 3 months\par 13.	Hypothyroidism: on levothyroxine 75mcg daily\par a.  continue current dose of levothyroxine\par 14.	Hypercholesterolemia: on pravastatin\par a.  continue pravastatin\par 15.	Constipation: stable on miralax, colace\par a.  hydration\par b.  high fiber diet\par c.  continue miralax and colace\par 16.  	HCM\par a.  flu vaccine given left deltoid\par 17.	Above plan was discussed with group home staff

## 2019-09-25 DIAGNOSIS — K59.00 CONSTIPATION, UNSPECIFIED: ICD-10-CM

## 2019-09-25 DIAGNOSIS — E78.00 PURE HYPERCHOLESTEROLEMIA, UNSPECIFIED: ICD-10-CM

## 2019-09-25 DIAGNOSIS — E11.9 TYPE 2 DIABETES MELLITUS WITHOUT COMPLICATIONS: ICD-10-CM

## 2019-09-25 DIAGNOSIS — Z00.00 ENCOUNTER FOR GENERAL ADULT MEDICAL EXAMINATION WITHOUT ABNORMAL FINDINGS: ICD-10-CM

## 2019-09-25 DIAGNOSIS — E03.9 HYPOTHYROIDISM, UNSPECIFIED: ICD-10-CM

## 2019-09-25 DIAGNOSIS — E66.9 OBESITY, UNSPECIFIED: ICD-10-CM

## 2019-09-25 DIAGNOSIS — Z23 ENCOUNTER FOR IMMUNIZATION: ICD-10-CM

## 2019-10-02 LAB
ALBUMIN SERPL ELPH-MCNC: 3.9 G/DL
ALP BLD-CCNC: 64 U/L
ALT SERPL-CCNC: 6 U/L
ANION GAP SERPL CALC-SCNC: 15 MMOL/L
AST SERPL-CCNC: 12 U/L
BASOPHILS # BLD AUTO: 0.08 K/UL
BASOPHILS NFR BLD AUTO: 0.7 %
BILIRUB SERPL-MCNC: 0.5 MG/DL
BUN SERPL-MCNC: 15 MG/DL
CALCIUM SERPL-MCNC: 9.4 MG/DL
CHLORIDE SERPL-SCNC: 101 MMOL/L
CHOLEST SERPL-MCNC: 119 MG/DL
CHOLEST/HDLC SERPL: 3.5 RATIO
CO2 SERPL-SCNC: 19 MMOL/L
CREAT SERPL-MCNC: 0.8 MG/DL
EOSINOPHIL # BLD AUTO: 0.23 K/UL
EOSINOPHIL NFR BLD AUTO: 1.9 %
ESTIMATED AVERAGE GLUCOSE: 131 MG/DL
GLUCOSE SERPL-MCNC: 176 MG/DL
HBA1C MFR BLD HPLC: 6.2 %
HCT VFR BLD CALC: 39.6 %
HDLC SERPL-MCNC: 34 MG/DL
HGB BLD-MCNC: 12.9 G/DL
IMM GRANULOCYTES NFR BLD AUTO: 1.6 %
LDLC SERPL CALC-MCNC: 70 MG/DL
LYMPHOCYTES # BLD AUTO: 1.92 K/UL
LYMPHOCYTES NFR BLD AUTO: 16.2 %
MAN DIFF?: NORMAL
MCHC RBC-ENTMCNC: 29.8 PG
MCHC RBC-ENTMCNC: 32.6 G/DL
MCV RBC AUTO: 91.5 FL
MONOCYTES # BLD AUTO: 1.5 K/UL
MONOCYTES NFR BLD AUTO: 12.6 %
NEUTROPHILS # BLD AUTO: 7.94 K/UL
NEUTROPHILS NFR BLD AUTO: 67 %
PLATELET # BLD AUTO: 285 K/UL
POTASSIUM SERPL-SCNC: 4 MMOL/L
PROT SERPL-MCNC: 7.2 G/DL
RBC # BLD: 4.33 M/UL
RBC # FLD: 12.4 %
SODIUM SERPL-SCNC: 135 MMOL/L
TRIGL SERPL-MCNC: 86 MG/DL
WBC # FLD AUTO: 11.86 K/UL

## 2019-10-03 LAB — TSH SERPL-ACNC: 1.35 UIU/ML

## 2019-11-04 ENCOUNTER — APPOINTMENT (OUTPATIENT)
Dept: ENDOCRINOLOGY | Facility: CLINIC | Age: 29
End: 2019-11-04
Payer: MEDICAID

## 2019-11-04 VITALS
OXYGEN SATURATION: 96 % | BODY MASS INDEX: 29.23 KG/M2 | DIASTOLIC BLOOD PRESSURE: 60 MMHG | HEIGHT: 63 IN | SYSTOLIC BLOOD PRESSURE: 100 MMHG | RESPIRATION RATE: 18 BRPM | WEIGHT: 165 LBS | HEART RATE: 76 BPM

## 2019-11-04 PROCEDURE — 99214 OFFICE O/P EST MOD 30 MIN: CPT

## 2019-11-06 ENCOUNTER — OUTPATIENT (OUTPATIENT)
Dept: OUTPATIENT SERVICES | Facility: HOSPITAL | Age: 29
LOS: 1 days | Discharge: HOME | End: 2019-11-06

## 2019-11-06 ENCOUNTER — APPOINTMENT (OUTPATIENT)
Dept: PEDIATRIC DEVELOPMENTAL SERVICES | Facility: HOSPITAL | Age: 29
End: 2019-11-06
Payer: COMMERCIAL

## 2019-11-06 VITALS
HEART RATE: 64 BPM | DIASTOLIC BLOOD PRESSURE: 74 MMHG | HEIGHT: 63 IN | RESPIRATION RATE: 12 BRPM | BODY MASS INDEX: 29.06 KG/M2 | WEIGHT: 164 LBS | TEMPERATURE: 98.9 F | SYSTOLIC BLOOD PRESSURE: 102 MMHG

## 2019-11-06 DIAGNOSIS — K59.00 CONSTIPATION, UNSPECIFIED: ICD-10-CM

## 2019-11-06 DIAGNOSIS — R13.10 DYSPHAGIA, UNSPECIFIED: ICD-10-CM

## 2019-11-06 DIAGNOSIS — E78.00 PURE HYPERCHOLESTEROLEMIA, UNSPECIFIED: ICD-10-CM

## 2019-11-06 DIAGNOSIS — E03.9 HYPOTHYROIDISM, UNSPECIFIED: ICD-10-CM

## 2019-11-06 DIAGNOSIS — E11.9 TYPE 2 DIABETES MELLITUS WITHOUT COMPLICATIONS: ICD-10-CM

## 2019-11-06 PROCEDURE — 99213 OFFICE O/P EST LOW 20 MIN: CPT

## 2019-11-06 RX ORDER — HYDROCORTISONE 1 %
12 CREAM (GRAM) TOPICAL
Refills: 0 | Status: DISCONTINUED | COMMUNITY
End: 2019-11-06

## 2019-11-06 NOTE — HISTORY OF PRESENT ILLNESS
[Other: _____] : [unfilled] [FreeTextEntry1] : Pt. is a 30yo female, sent in from [facility] for evaluation.  According to [facility] RN, pt. has had 2 episodes of intermittent coughing/vomiting during eating.  Pt. unable to provide any history or complaint due to her cognitive impairment.  Pt. has no fever, no loss of appetite, no shortness of breath

## 2019-11-06 NOTE — PHYSICAL EXAM
[No Respiratory Distress] : no respiratory distress  [No Accessory Muscle Use] : no accessory muscle use [Clear to Auscultation] : lungs were clear to auscultation bilaterally [Normal Rate] : normal rate  [Regular Rhythm] : with a regular rhythm [Normal S1, S2] : normal S1 and S2 [No Carotid Bruits] : no carotid bruits [No Abdominal Bruit] : a ~M bruit was not heard ~T in the abdomen [Pedal Pulses Present] : the pedal pulses are present [No Edema] : there was no peripheral edema [No Palpable Aorta] : no palpable aorta [No Extremity Clubbing/Cyanosis] : no extremity clubbing/cyanosis [Soft] : abdomen soft [Non Tender] : non-tender [Non-distended] : non-distended [No Masses] : no abdominal mass palpated [No HSM] : no HSM [Normal Bowel Sounds] : normal bowel sounds [No Joint Swelling] : no joint swelling [Grossly Normal Strength/Tone] : grossly normal strength/tone

## 2020-01-13 ENCOUNTER — APPOINTMENT (OUTPATIENT)
Dept: ENDOCRINOLOGY | Facility: CLINIC | Age: 30
End: 2020-01-13

## 2020-01-24 NOTE — ASSESSMENT
[Carbohydrate Consistent Diet] : carbohydrate consistent diet [Diabetes Foot Care] : diabetes foot care [Long Term Vascular Complications] : long term vascular complications of diabetes [Importance of Diet and Exercise] : importance of diet and exercise to improve glycemic control, achieve weight loss and improve cardiovascular health [Levothyroxine] : The patient was instructed to take Levothyroxine on an empty stomach, separate from vitamins, and wait at least 30 minutes before eating [FreeTextEntry1] : metformin, januvia, peioglitazone

## 2020-01-24 NOTE — DATA REVIEWED
[No studies available for review at this time.] : No studies available for review at this time. [FreeTextEntry1] : Patient hgb a1c 6.2, glucose 176

## 2020-01-24 NOTE — PHYSICAL EXAM
[Alert] : alert [No Acute Distress] : no acute distress [Well Nourished] : well nourished [Well Developed] : well developed [Healthy Appearance] : healthy appearance [Normal Sclera/Conjunctiva] : normal sclera/conjunctiva [EOMI] : extra ocular movement intact [No Proptosis] : no proptosis [Normal Hearing] : hearing was normal [Normal Oropharynx] : the oropharynx was normal [Thyroid Not Enlarged] : the thyroid was not enlarged [No Thyroid Nodules] : there were no palpable thyroid nodules [No Respiratory Distress] : no respiratory distress [No Accessory Muscle Use] : no accessory muscle use [Clear to Auscultation] : lungs were clear to auscultation bilaterally [Normal Rate] : heart rate was normal  [Normal S1, S2] : normal S1 and S2 [Regular Rhythm] : with a regular rhythm [Pedal Pulses Normal] : the pedal pulses are present [No Edema] : there was no peripheral edema [Normal Bowel Sounds] : normal bowel sounds [Not Tender] : non-tender [Soft] : abdomen soft [Not Distended] : not distended [Post Cervical Nodes] : posterior cervical nodes [Anterior Cervical Nodes] : anterior cervical nodes [Axillary Nodes] : axillary nodes [Normal] : normal and non tender [No Spinal Tenderness] : no spinal tenderness [Spine Straight] : spine straight [Normal Gait] : normal gait [Normal Strength/Tone] : muscle strength and tone were normal [No Rash] : no rash [Normal Reflexes] : deep tendon reflexes were 2+ and symmetric [No Tremors] : no tremors [Oriented x3] : oriented to person, place, and time [Acanthosis Nigricans] : no acanthosis nigricans [de-identified] : mentally delay [de-identified] : screaming, mentally delay [de-identified] : DM2

## 2020-01-24 NOTE — THERAPY
[BG Target = ____] : BG Target = [unfilled] [FreeTextEntry7] : patient continue with Januvia 100 mg po qd, metformin 1000mg bid and start pioglitazone

## 2020-01-31 ENCOUNTER — LABORATORY RESULT (OUTPATIENT)
Age: 30
End: 2020-01-31

## 2020-02-03 ENCOUNTER — APPOINTMENT (OUTPATIENT)
Dept: ENDOCRINOLOGY | Facility: CLINIC | Age: 30
End: 2020-02-03
Payer: COMMERCIAL

## 2020-02-03 VITALS
HEART RATE: 70 BPM | OXYGEN SATURATION: 97 % | BODY MASS INDEX: 29.06 KG/M2 | HEIGHT: 63 IN | WEIGHT: 164 LBS | DIASTOLIC BLOOD PRESSURE: 70 MMHG | SYSTOLIC BLOOD PRESSURE: 104 MMHG

## 2020-02-03 PROCEDURE — 99214 OFFICE O/P EST MOD 30 MIN: CPT

## 2020-02-03 RX ORDER — PIOGLITAZONE HYDROCHLORIDE 30 MG/1
30 TABLET ORAL DAILY
Qty: 30 | Refills: 5 | Status: DISCONTINUED | COMMUNITY
Start: 2019-11-04 | End: 2020-02-03

## 2020-02-03 NOTE — THERAPY
[Today's Date] : [unfilled] [BG Target = ____] : BG Target = [unfilled] [FreeTextEntry7] : metformin 1000 mg bid, Januvia 100 mg po daily, pioglitazone 30 mg daily

## 2020-02-03 NOTE — PHYSICAL EXAM
[No Acute Distress] : no acute distress [Alert] : alert [Well Nourished] : well nourished [Healthy Appearance] : healthy appearance [Well Developed] : well developed [EOMI] : extra ocular movement intact [PERRL] : pupils equal, round and reactive to light [Normal Sclera/Conjunctiva] : normal sclera/conjunctiva [No Neck Mass] : no neck mass was observed [No Proptosis] : no proptosis [Normal Oropharynx] : the oropharynx was normal [No Thyroid Nodules] : there were no palpable thyroid nodules [No Respiratory Distress] : no respiratory distress [Thyroid Not Enlarged] : the thyroid was not enlarged [Clear to Auscultation] : lungs were clear to auscultation bilaterally [No Accessory Muscle Use] : no accessory muscle use [Normal Rate] : heart rate was normal  [Normal S1, S2] : normal S1 and S2 [Regular Rhythm] : with a regular rhythm [Pedal Pulses Normal] : the pedal pulses are present [Not Tender] : non-tender [Normal Bowel Sounds] : normal bowel sounds [No Edema] : there was no peripheral edema [Not Distended] : not distended [Soft] : abdomen soft [Anterior Cervical Nodes] : anterior cervical nodes [Post Cervical Nodes] : posterior cervical nodes [Normal] : normal and non tender [Axillary Nodes] : axillary nodes [No Spinal Tenderness] : no spinal tenderness [Normal Gait] : normal gait [Spine Straight] : spine straight [Normal Strength/Tone] : muscle strength and tone were normal [No Rash] : no rash [No Tremors] : no tremors [Cranial Nerves Intact] : cranial nerves 2-12 were intact [Normal Reflexes] : deep tendon reflexes were 2+ and symmetric [Normal Affect] : the affect was normal [Normal Mood] : the mood was normal [Acanthosis Nigricans] : no acanthosis nigricans [de-identified] : patient non verbal, she paces in the room [de-identified] : obese [de-identified] : DM2, overweight [de-identified] : autistic

## 2020-02-03 NOTE — HISTORY OF PRESENT ILLNESS
[___] : [unfilled] [Hypoglycemia] : hypoglycemic [FreeTextEntry1] : Noted in the evening and Debbie has been skipping meals

## 2020-02-03 NOTE — ASSESSMENT
[Carbohydrate Consistent Diet] : carbohydrate consistent diet [Long Term Vascular Complications] : long term vascular complications of diabetes [Diabetes Foot Care] : diabetes foot care [Hypoglycemia Management] : hypoglycemia management [Importance of Diet and Exercise] : importance of diet and exercise to improve glycemic control, achieve weight loss and improve cardiovascular health [Self Monitoring of Blood Glucose] : self monitoring of blood glucose [Levothyroxine] : The patient was instructed to take Levothyroxine on an empty stomach, separate from vitamins, and wait at least 30 minutes before eating [FreeTextEntry1] : patient continue to monitor blood glucose 2 x a day.\par Continue with metformin 1000mg bid and januvia 100mg daily. Stop pioglitazone

## 2020-02-03 NOTE — REASON FOR VISIT
[Follow-Up: _____] : a [unfilled] follow-up visit [Formal Caregiver] : formal caregiver [Other: _____] : [unfilled] [FreeTextEntry1] : DM2, had hypoglycemia, her pcp discontinue pioglitazone

## 2020-02-10 ENCOUNTER — OUTPATIENT (OUTPATIENT)
Dept: OUTPATIENT SERVICES | Facility: HOSPITAL | Age: 30
LOS: 1 days | Discharge: HOME | End: 2020-02-10

## 2020-02-10 ENCOUNTER — APPOINTMENT (OUTPATIENT)
Dept: PEDIATRIC DEVELOPMENTAL SERVICES | Facility: HOSPITAL | Age: 30
End: 2020-02-10
Payer: COMMERCIAL

## 2020-02-10 VITALS
HEART RATE: 70 BPM | HEIGHT: 63 IN | RESPIRATION RATE: 12 BRPM | WEIGHT: 166 LBS | DIASTOLIC BLOOD PRESSURE: 72 MMHG | BODY MASS INDEX: 29.41 KG/M2 | TEMPERATURE: 97.5 F | SYSTOLIC BLOOD PRESSURE: 100 MMHG

## 2020-02-10 PROCEDURE — 99213 OFFICE O/P EST LOW 20 MIN: CPT | Mod: GC

## 2020-02-10 NOTE — HISTORY OF PRESENT ILLNESS
[Other: _____] : [unfilled] [FreeTextEntry1] : Pt. is a 31yo female, here for follow up hypothyroidism, NIDDM, hypercholesterolemia, constipation and medication renewal.  Pt. unable to provide any history or complain due to her cognitive impairment.  No issues reported by staff

## 2020-02-10 NOTE — ASSESSMENT
[FreeTextEntry1] : HCM\par a.  pt. was evaluated by group home speech therapist for dysphagia, no dysphagia identified as per group home RN Deepti.  Recommended RN to provide speech swallow consultation report \par

## 2020-02-10 NOTE — PHYSICAL EXAM
[No Respiratory Distress] : no respiratory distress  [No Accessory Muscle Use] : no accessory muscle use [Clear to Auscultation] : lungs were clear to auscultation bilaterally [Normal Rate] : normal rate  [Regular Rhythm] : with a regular rhythm [Normal S1, S2] : normal S1 and S2 [No Carotid Bruits] : no carotid bruits [No Abdominal Bruit] : a ~M bruit was not heard ~T in the abdomen [Pedal Pulses Present] : the pedal pulses are present [No Edema] : there was no peripheral edema [No Palpable Aorta] : no palpable aorta [No Extremity Clubbing/Cyanosis] : no extremity clubbing/cyanosis [Soft] : abdomen soft [Non Tender] : non-tender [No Masses] : no abdominal mass palpated [Non-distended] : non-distended [No HSM] : no HSM [Normal Bowel Sounds] : normal bowel sounds [No Joint Swelling] : no joint swelling [Grossly Normal Strength/Tone] : grossly normal strength/tone

## 2020-02-13 DIAGNOSIS — E03.9 HYPOTHYROIDISM, UNSPECIFIED: ICD-10-CM

## 2020-02-13 DIAGNOSIS — E66.9 OBESITY, UNSPECIFIED: ICD-10-CM

## 2020-02-13 DIAGNOSIS — E11.9 TYPE 2 DIABETES MELLITUS WITHOUT COMPLICATIONS: ICD-10-CM

## 2020-02-13 DIAGNOSIS — E78.00 PURE HYPERCHOLESTEROLEMIA, UNSPECIFIED: ICD-10-CM

## 2020-02-13 DIAGNOSIS — K59.00 CONSTIPATION, UNSPECIFIED: ICD-10-CM

## 2020-02-26 ENCOUNTER — OUTPATIENT (OUTPATIENT)
Dept: OUTPATIENT SERVICES | Facility: HOSPITAL | Age: 30
LOS: 1 days | Discharge: HOME | End: 2020-02-26

## 2020-05-11 ENCOUNTER — APPOINTMENT (OUTPATIENT)
Dept: ENDOCRINOLOGY | Facility: CLINIC | Age: 30
End: 2020-05-11

## 2020-05-11 ENCOUNTER — APPOINTMENT (OUTPATIENT)
Dept: PEDIATRIC DEVELOPMENTAL SERVICES | Facility: HOSPITAL | Age: 30
End: 2020-05-11

## 2020-06-08 ENCOUNTER — APPOINTMENT (OUTPATIENT)
Dept: ENDOCRINOLOGY | Facility: CLINIC | Age: 30
End: 2020-06-08
Payer: COMMERCIAL

## 2020-06-08 ENCOUNTER — APPOINTMENT (OUTPATIENT)
Dept: ENDOCRINOLOGY | Facility: CLINIC | Age: 30
End: 2020-06-08

## 2020-06-08 VITALS
HEART RATE: 84 BPM | RESPIRATION RATE: 18 BRPM | WEIGHT: 170 LBS | DIASTOLIC BLOOD PRESSURE: 70 MMHG | HEIGHT: 63 IN | BODY MASS INDEX: 30.12 KG/M2 | OXYGEN SATURATION: 96 % | SYSTOLIC BLOOD PRESSURE: 110 MMHG

## 2020-06-08 PROCEDURE — 99214 OFFICE O/P EST MOD 30 MIN: CPT

## 2020-06-08 NOTE — PHYSICAL EXAM
[Alert] : alert [Well Nourished] : well nourished [Healthy Appearance] : healthy appearance [No Acute Distress] : no acute distress [Obese] : obese [Normal Sclera/Conjunctiva] : normal sclera/conjunctiva [EOMI] : extra ocular movement intact [No Proptosis] : no proptosis [Normal Hearing] : hearing was normal [Thyroid Not Enlarged] : the thyroid was not enlarged [No Thyroid Nodules] : no palpable thyroid nodules [No Respiratory Distress] : no respiratory distress [No Accessory Muscle Use] : no accessory muscle use [Clear to Auscultation] : lungs were clear to auscultation bilaterally [Normal S1, S2] : normal S1 and S2 [Normal Rate] : heart rate was normal [No Edema] : no peripheral edema [Regular Rhythm] : with a regular rhythm [Pedal Pulses Normal] : the pedal pulses are present [Not Tender] : non-tender [Normal Bowel Sounds] : normal bowel sounds [Not Distended] : not distended [Normal Anterior Cervical Nodes] : no anterior cervical lymphadenopathy [Soft] : abdomen soft [Normal Posterior Cervical Nodes] : no posterior cervical lymphadenopathy [No Spinal Tenderness] : no spinal tenderness [Spine Straight] : spine straight [Normal Gait] : normal gait [Normal Strength/Tone] : muscle strength and tone were normal [No Rash] : no rash [Acanthosis Nigricans] : no acanthosis nigricans [Normal Reflexes] : deep tendon reflexes were 2+ and symmetric [No Tremors] : no tremors [Normal Affect] : the affect was normal [Normal Mood] : the mood was normal [de-identified] : obese [de-identified] : came with  from home: autism, non verbal [de-identified] : DM2, hypothyroid, obesity [de-identified] : autism

## 2020-06-08 NOTE — THERAPY
[Today's Date] : [unfilled] [BG Target = ____] : BG Target = [unfilled] [FreeTextEntry7] : metformin 1000 mg bid, januvia 100 mg

## 2020-06-08 NOTE — REASON FOR VISIT
[Follow - Up] : a follow-up visit [DM Type 2] : DM Type 2 [Hypothyroidism] : hypothyroidism [Weight Management/Obesity] : weight management/obesity

## 2020-06-08 NOTE — ASSESSMENT
[FreeTextEntry1] : diet and exercise [Diabetes Foot Care] : diabetes foot care [Long Term Vascular Complications] : long term vascular complications of diabetes [Carbohydrate Consistent Diet] : carbohydrate consistent diet [Importance of Diet and Exercise] : importance of diet and exercise to improve glycemic control, achieve weight loss and improve cardiovascular health [Exercise/Effect on Glucose] : exercise/effect on glucose [Hypoglycemia Management] : hypoglycemia management [Self Monitoring of Blood Glucose] : self monitoring of blood glucose [Weight Loss] : weight loss [Diabetic Medications] : Risks and benefits of diabetic medications were discussed [Levothyroxine] : The patient was instructed to take Levothyroxine on an empty stomach, separate from vitamins, and wait at least 30 minutes before eating

## 2020-06-08 NOTE — HISTORY OF PRESENT ILLNESS
[Continuous Glucose Monitoring] : Continuous Glucose Monitoring: No [Finger Stick] : Finger Stick: Yes [Hypoglycemia] : Patient is not hypoglycemic. [FreeTextEntry9] : [id number] [FreeTextEntry8] :  [FreeTextEntry1] : testing 2-3 x a day

## 2020-06-11 ENCOUNTER — APPOINTMENT (OUTPATIENT)
Dept: INTERNAL MEDICINE | Facility: CLINIC | Age: 30
End: 2020-06-11
Payer: COMMERCIAL

## 2020-06-11 ENCOUNTER — OUTPATIENT (OUTPATIENT)
Dept: OUTPATIENT SERVICES | Facility: HOSPITAL | Age: 30
LOS: 1 days | Discharge: HOME | End: 2020-06-11

## 2020-06-11 ENCOUNTER — APPOINTMENT (OUTPATIENT)
Dept: PEDIATRIC DEVELOPMENTAL SERVICES | Facility: HOSPITAL | Age: 30
End: 2020-06-11

## 2020-06-11 VITALS
HEART RATE: 97 BPM | BODY MASS INDEX: 29.95 KG/M2 | TEMPERATURE: 97.7 F | SYSTOLIC BLOOD PRESSURE: 102 MMHG | HEIGHT: 63 IN | WEIGHT: 169 LBS | DIASTOLIC BLOOD PRESSURE: 81 MMHG

## 2020-06-11 DIAGNOSIS — E11.9 TYPE 2 DIABETES MELLITUS WITHOUT COMPLICATIONS: ICD-10-CM

## 2020-06-11 DIAGNOSIS — E03.9 HYPOTHYROIDISM, UNSPECIFIED: ICD-10-CM

## 2020-06-11 DIAGNOSIS — E66.9 OBESITY, UNSPECIFIED: ICD-10-CM

## 2020-06-11 DIAGNOSIS — R13.10 DYSPHAGIA, UNSPECIFIED: ICD-10-CM

## 2020-06-11 DIAGNOSIS — E78.00 PURE HYPERCHOLESTEROLEMIA, UNSPECIFIED: ICD-10-CM

## 2020-06-11 DIAGNOSIS — K21.0 GASTRO-ESOPHAGEAL REFLUX DISEASE WITH ESOPHAGITIS: ICD-10-CM

## 2020-06-11 DIAGNOSIS — K59.00 CONSTIPATION, UNSPECIFIED: ICD-10-CM

## 2020-06-11 LAB
ALBUMIN SERPL ELPH-MCNC: 4.2 G/DL
ALP BLD-CCNC: 46 U/L
ALT SERPL-CCNC: 8 U/L
ANION GAP SERPL CALC-SCNC: 14 MMOL/L
AST SERPL-CCNC: 12 U/L
BASOPHILS # BLD AUTO: 0.1 K/UL
BASOPHILS NFR BLD AUTO: 1 %
BILIRUB SERPL-MCNC: 0.3 MG/DL
BUN SERPL-MCNC: 13 MG/DL
CALCIUM SERPL-MCNC: 9.3 MG/DL
CHLORIDE SERPL-SCNC: 106 MMOL/L
CO2 SERPL-SCNC: 22 MMOL/L
CREAT SERPL-MCNC: 0.9 MG/DL
EOSINOPHIL # BLD AUTO: 0.24 K/UL
EOSINOPHIL NFR BLD AUTO: 2.4 %
ESTIMATED AVERAGE GLUCOSE: 126 MG/DL
GLUCOSE SERPL-MCNC: 144 MG/DL
HBA1C MFR BLD HPLC: 6 %
HCT VFR BLD CALC: 44.6 %
HGB BLD-MCNC: 14 G/DL
IMM GRANULOCYTES NFR BLD AUTO: 0.7 %
LYMPHOCYTES # BLD AUTO: 2.54 K/UL
LYMPHOCYTES NFR BLD AUTO: 25 %
MAN DIFF?: NORMAL
MCHC RBC-ENTMCNC: 30.4 PG
MCHC RBC-ENTMCNC: 31.4 G/DL
MCV RBC AUTO: 97 FL
MONOCYTES # BLD AUTO: 1.01 K/UL
MONOCYTES NFR BLD AUTO: 9.9 %
NEUTROPHILS # BLD AUTO: 6.22 K/UL
NEUTROPHILS NFR BLD AUTO: 61 %
PLATELET # BLD AUTO: 213 K/UL
POTASSIUM SERPL-SCNC: 4.4 MMOL/L
PROT SERPL-MCNC: 7 G/DL
RBC # BLD: 4.6 M/UL
RBC # FLD: 12.7 %
SODIUM SERPL-SCNC: 142 MMOL/L
T4 FREE SERPL-MCNC: 1 NG/DL
TSH SERPL-ACNC: 3.33 UIU/ML
WBC # FLD AUTO: 10.18 K/UL

## 2020-06-11 PROCEDURE — 99213 OFFICE O/P EST LOW 20 MIN: CPT | Mod: 95,GC

## 2020-06-11 NOTE — HISTORY OF PRESENT ILLNESS
[Other: _____] : [unfilled] [Home] : at home, [unfilled] , at the time of the visit. [Medical Office: ([facility])___] : at the medical office located in  [Other:____] : [unfilled] [FreeTextEntry3] : group home staff Lavelle Linder [FreeTextEntry1] : Pt. is a 29yo female with NIDDM, hypothyroidism, hypercholesterolemia, constipation, dysphagia, GERD and obesity.  Pt. for follow up and medication renewal.  Pt. unable to provide any history or complaint due to her cognitive impairment

## 2020-06-15 ENCOUNTER — APPOINTMENT (OUTPATIENT)
Dept: PEDIATRIC DEVELOPMENTAL SERVICES | Facility: CLINIC | Age: 30
End: 2020-06-15

## 2020-06-22 ENCOUNTER — OUTPATIENT (OUTPATIENT)
Dept: OUTPATIENT SERVICES | Facility: HOSPITAL | Age: 30
LOS: 1 days | Discharge: HOME | End: 2020-06-22

## 2020-06-22 DIAGNOSIS — Z02.9 ENCOUNTER FOR ADMINISTRATIVE EXAMINATIONS, UNSPECIFIED: ICD-10-CM

## 2020-06-24 DIAGNOSIS — E78.00 PURE HYPERCHOLESTEROLEMIA, UNSPECIFIED: ICD-10-CM

## 2020-06-24 DIAGNOSIS — E03.9 HYPOTHYROIDISM, UNSPECIFIED: ICD-10-CM

## 2020-06-24 DIAGNOSIS — K59.00 CONSTIPATION, UNSPECIFIED: ICD-10-CM

## 2020-06-24 DIAGNOSIS — E11.9 TYPE 2 DIABETES MELLITUS WITHOUT COMPLICATIONS: ICD-10-CM

## 2020-06-24 DIAGNOSIS — E66.9 OBESITY, UNSPECIFIED: ICD-10-CM

## 2020-06-24 DIAGNOSIS — R13.10 DYSPHAGIA, UNSPECIFIED: ICD-10-CM

## 2020-06-24 DIAGNOSIS — K21.0 GASTRO-ESOPHAGEAL REFLUX DISEASE WITH ESOPHAGITIS: ICD-10-CM

## 2020-07-06 ENCOUNTER — APPOINTMENT (OUTPATIENT)
Dept: PEDIATRIC DEVELOPMENTAL SERVICES | Facility: CLINIC | Age: 30
End: 2020-07-06

## 2020-07-29 RX ORDER — BLOOD-GLUCOSE METER
W/DEVICE EACH MISCELLANEOUS
Qty: 1 | Refills: 1 | Status: ACTIVE | COMMUNITY
Start: 2020-07-29 | End: 1900-01-01

## 2020-09-18 LAB
24R-OH-CALCIDIOL SERPL-MCNC: 20.5 PG/ML
ALBUMIN SERPL ELPH-MCNC: 3.8 G/DL
ALP BLD-CCNC: 50 U/L
ALT SERPL-CCNC: 11 U/L
ANION GAP SERPL CALC-SCNC: 9 MMOL/L
AST SERPL-CCNC: 14 U/L
BASOPHILS # BLD AUTO: 0.08 K/UL
BASOPHILS NFR BLD AUTO: 1.1 %
BILIRUB SERPL-MCNC: 0.3 MG/DL
BUN SERPL-MCNC: 21 MG/DL
CALCIUM SERPL-MCNC: 9.5 MG/DL
CHLORIDE SERPL-SCNC: 106 MMOL/L
CHOLEST SERPL-MCNC: 98 MG/DL
CHOLEST/HDLC SERPL: 2.5 RATIO
CO2 SERPL-SCNC: 22 MMOL/L
CREAT SERPL-MCNC: 1 MG/DL
EOSINOPHIL # BLD AUTO: 0.16 K/UL
EOSINOPHIL NFR BLD AUTO: 2.1 %
ESTIMATED AVERAGE GLUCOSE: 108 MG/DL
GLUCOSE SERPL-MCNC: 126 MG/DL
HBA1C MFR BLD HPLC: 5.4 %
HCT VFR BLD CALC: 43.9 %
HDLC SERPL-MCNC: 39 MG/DL
HGB BLD-MCNC: 13.7 G/DL
IMM GRANULOCYTES NFR BLD AUTO: 0.4 %
LDLC SERPL CALC-MCNC: 49 MG/DL
LYMPHOCYTES # BLD AUTO: 1.73 K/UL
LYMPHOCYTES NFR BLD AUTO: 23.1 %
MAN DIFF?: NORMAL
MCHC RBC-ENTMCNC: 30.4 PG
MCHC RBC-ENTMCNC: 31.2 G/DL
MCV RBC AUTO: 97.3 FL
MONOCYTES # BLD AUTO: 0.84 K/UL
MONOCYTES NFR BLD AUTO: 11.2 %
NEUTROPHILS # BLD AUTO: 4.64 K/UL
NEUTROPHILS NFR BLD AUTO: 62.1 %
PLATELET # BLD AUTO: 199 K/UL
POTASSIUM SERPL-SCNC: 4.1 MMOL/L
PROT SERPL-MCNC: 6.8 G/DL
RBC # BLD: 4.51 M/UL
RBC # FLD: 12.4 %
SODIUM SERPL-SCNC: 137 MMOL/L
T3FREE SERPL-MCNC: 2.64 PG/ML
T4 FREE SERPL-MCNC: 1.4 NG/DL
TRIGL SERPL-MCNC: 70 MG/DL
TSH SERPL-ACNC: 0.2 UIU/ML
VIT B12 SERPL-MCNC: 557 PG/ML
WBC # FLD AUTO: 7.48 K/UL

## 2020-09-21 LAB
CREAT SPEC-SCNC: 20 MG/DL
MICROALBUMIN 24H UR DL<=1MG/L-MCNC: <1.2 MG/DL
MICROALBUMIN/CREAT 24H UR-RTO: NORMAL MG/G

## 2020-09-28 ENCOUNTER — OUTPATIENT (OUTPATIENT)
Dept: OUTPATIENT SERVICES | Facility: HOSPITAL | Age: 30
LOS: 1 days | Discharge: HOME | End: 2020-09-28
Payer: COMMERCIAL

## 2020-09-28 ENCOUNTER — APPOINTMENT (OUTPATIENT)
Dept: PEDIATRIC DEVELOPMENTAL SERVICES | Facility: CLINIC | Age: 30
End: 2020-09-28
Payer: COMMERCIAL

## 2020-09-28 VITALS
HEART RATE: 96 BPM | BODY MASS INDEX: 30.12 KG/M2 | SYSTOLIC BLOOD PRESSURE: 108 MMHG | HEIGHT: 63 IN | TEMPERATURE: 98.3 F | DIASTOLIC BLOOD PRESSURE: 78 MMHG | WEIGHT: 170 LBS

## 2020-09-28 DIAGNOSIS — K59.00 CONSTIPATION, UNSPECIFIED: ICD-10-CM

## 2020-09-28 DIAGNOSIS — E78.00 PURE HYPERCHOLESTEROLEMIA, UNSPECIFIED: ICD-10-CM

## 2020-09-28 DIAGNOSIS — E11.9 TYPE 2 DIABETES MELLITUS WITHOUT COMPLICATIONS: ICD-10-CM

## 2020-09-28 DIAGNOSIS — Z23 ENCOUNTER FOR IMMUNIZATION: ICD-10-CM

## 2020-09-28 DIAGNOSIS — H00.019 HORDEOLUM EXTERNUM UNSPECIFIED EYE, UNSPECIFIED EYELID: ICD-10-CM

## 2020-09-28 DIAGNOSIS — Z00.00 ENCOUNTER FOR GENERAL ADULT MEDICAL EXAMINATION WITHOUT ABNORMAL FINDINGS: ICD-10-CM

## 2020-09-28 DIAGNOSIS — E66.9 OBESITY, UNSPECIFIED: ICD-10-CM

## 2020-09-28 PROCEDURE — 93010 ELECTROCARDIOGRAM REPORT: CPT | Mod: 76

## 2020-09-28 PROCEDURE — 99214 OFFICE O/P EST MOD 30 MIN: CPT | Mod: 25,GC

## 2020-09-28 NOTE — END OF VISIT
[] : Resident [FreeTextEntry3] : I personally discussed this patient with the resident at the time of the visit.  And I was present with the resident during the key portions of the history and exam.  I agree with the assessment and plan as written, unless noted below.\par \par Pt. has stye on mid-upper eyelid of right eye with edema of right upper eyelid.  Will give ophthalmic drop, and warm compress to affected area of eye.  Follow up visit in 1 week if not improved.  Pt. also here for annual physical exam, flu vaccine given.  Pt. had blood work done 9/16/20 by Dr. Aparicio showed TSH 0.2, T4 1.4, T3 2.64, A1C 5.4, LDL 49, triglyceride 70, B12 557.  Will decrease levothyroxine to 50mcg daily, TSH 1 week prior to next visit in 3 months.  EKG ordered, and pt. needs to schedule Ob/Gyn for evaluation. Awaiting eye exam.  Staff reports pt. had speech evaluation done at group home, advised to provide copy of evaluation

## 2020-09-28 NOTE — HISTORY OF PRESENT ILLNESS
[FreeTextEntry1] : annual eye exam [de-identified] : Pt. is a 31 y/o female with NIDDM, hypothyroidism, hypercholesterolemia, constipation, dysphagia, GERD and obesity. Pt. for follow up and medication renewal. Pt. unable to provide any history or complaint due to her cognitive impairment. Patient accompanied by group home staff Damaris Jo. Per assistant, patient has been doing well overall, she has been complaining of red itchy eye for the past 2 days that is limiting her eye opening. Her FS has been ranging between 120 and 140 almost in am. Her most recent TSH was 0.20.

## 2020-09-28 NOTE — PHYSICAL EXAM
[Normal] : normal rate, regular rhythm, normal S1 and S2 and no murmur heard [de-identified] : right red eye with swelling of the upper lid

## 2020-09-28 NOTE — PLAN
[FreeTextEntry1] : 01. NIDDM/obesity: HgA1C 6.0 on januvia, metformin\par a. continue metformin, januvia and pioglitazone\par b. follow up visit in 3 months in person\par c. will need follow up with optometry\par d. Has f/up with endo on 9/29/20\par \par 02. Hypothyroidism: TSH 0.20 on levothyroxine 75mcg daily\par a. Decreased synthroid dose to 50 mcg qd; has follow up with endo on 9/29/20\par \par 03. Hypercholesterolemia: on pravastatin\par a. continue pravastatin\par \par 04. Dysphagia: pt. still has not seen speech and swallow due to the pandemic\par a. awaiting speech and swallow evaluation, still was not scheduled \par \par 05. Constipation: on colace and miralax\par a. continue current plan with hydration and high fiber diet\par b. continue colace and miralax\par \par 06. Red pink eye:\par a. Most likely stye\par b. Will order artificial tears and warm compresses\par \par Flu shot given in left deltoid\par Gyn referral for pap smear\par C/w olanzapaine, lithium, divalproex  and nuedexta.\par RTC in 3 months

## 2020-09-30 ENCOUNTER — APPOINTMENT (OUTPATIENT)
Dept: OBGYN | Facility: CLINIC | Age: 30
End: 2020-09-30

## 2020-10-06 ENCOUNTER — APPOINTMENT (OUTPATIENT)
Dept: OPHTHALMOLOGY | Facility: CLINIC | Age: 30
End: 2020-10-06

## 2020-10-26 ENCOUNTER — TRANSCRIPTION ENCOUNTER (OUTPATIENT)
Age: 30
End: 2020-10-26

## 2020-11-02 ENCOUNTER — APPOINTMENT (OUTPATIENT)
Dept: ENDOCRINOLOGY | Facility: CLINIC | Age: 30
End: 2020-11-02
Payer: COMMERCIAL

## 2020-11-02 VITALS
WEIGHT: 166 LBS | BODY MASS INDEX: 29.41 KG/M2 | TEMPERATURE: 97.2 F | OXYGEN SATURATION: 6 % | HEART RATE: 97 BPM | RESPIRATION RATE: 18 BRPM | HEIGHT: 63 IN

## 2020-11-02 PROCEDURE — 99214 OFFICE O/P EST MOD 30 MIN: CPT

## 2020-11-02 PROCEDURE — 99072 ADDL SUPL MATRL&STAF TM PHE: CPT

## 2020-11-03 ENCOUNTER — APPOINTMENT (OUTPATIENT)
Dept: OPHTHALMOLOGY | Facility: CLINIC | Age: 30
End: 2020-11-03

## 2020-11-03 ENCOUNTER — OUTPATIENT (OUTPATIENT)
Dept: OUTPATIENT SERVICES | Facility: HOSPITAL | Age: 30
LOS: 1 days | Discharge: HOME | End: 2020-11-03
Payer: COMMERCIAL

## 2020-11-03 DIAGNOSIS — H52.10 MYOPIA, UNSPECIFIED EYE: ICD-10-CM

## 2020-11-03 DIAGNOSIS — E11.9 TYPE 2 DIABETES MELLITUS WITHOUT COMPLICATIONS: ICD-10-CM

## 2020-11-03 PROCEDURE — 92014 COMPRE OPH EXAM EST PT 1/>: CPT

## 2020-11-05 NOTE — THERAPY
[Today's Date] : [unfilled] [BG Target = ____] : BG Target = [unfilled] [FreeTextEntry7] : januvia, metformin, pioglitazone

## 2020-11-05 NOTE — HISTORY OF PRESENT ILLNESS
[Finger Stick] : Finger Stick: Yes [Hypoglycemia] : Patient is hypoglycemic. [Continuous Glucose Monitoring] : Continuous Glucose Monitoring: No [FreeTextEntry9] : 120-140 [FreeTextEntry1] : testing 1-3 x a day

## 2020-12-10 ENCOUNTER — OUTPATIENT (OUTPATIENT)
Dept: OUTPATIENT SERVICES | Facility: HOSPITAL | Age: 30
LOS: 1 days | Discharge: HOME | End: 2020-12-10

## 2020-12-10 DIAGNOSIS — R13.12 DYSPHAGIA, OROPHARYNGEAL PHASE: ICD-10-CM

## 2020-12-14 DIAGNOSIS — R13.10 DYSPHAGIA, UNSPECIFIED: ICD-10-CM

## 2020-12-15 ENCOUNTER — APPOINTMENT (OUTPATIENT)
Dept: ENDOCRINOLOGY | Facility: CLINIC | Age: 30
End: 2020-12-15

## 2020-12-29 ENCOUNTER — APPOINTMENT (OUTPATIENT)
Dept: ENDOCRINOLOGY | Facility: CLINIC | Age: 30
End: 2020-12-29
Payer: COMMERCIAL

## 2020-12-29 PROCEDURE — 99447 NTRPROF PH1/NTRNET/EHR 11-20: CPT

## 2020-12-29 NOTE — THERAPY
[Today's Date] : [unfilled] [BG Target = ____] : BG Target = [unfilled] [FreeTextEntry7] :  continue with Januvia 100 mg daily, Pioglitazone 15 mg daily and will decrease metfromin 1000 mg po from BID to OD (dinner only) will continue to monitor glucose bid and will call next week if any changes.

## 2020-12-29 NOTE — HISTORY OF PRESENT ILLNESS
[Home] : at home, [unfilled] , at the time of the visit. [Medical Office: ([facility])___] : at the medical office located in  [Other:____] : [unfilled] [Verbal consent obtained from patient] : the patient, [unfilled] [FreeTextEntry3] : Deepti Gar- Nurse in  group home [FreeTextEntry4] : Tiffany Mendez [FreeTextEntry1] : patient had low blood glucose 55, FBS in AM noted 128 to 146 and in evening 64 to 128. Spoke to nurse Deepti Gar and she is concern with patient glucose. Discussed medications and will continue with Januvia 100 mg daily, Pioglitazone 15 mg daily and will decrease metfromin 1000mg po from BID to OD (dinner only) will continue to monitor glucose bid and will call next week if any changes. Will order glucose tablets take 2 tabs prn for hypoglycemia episodes.\par \par Patient request ok fo Covid Vaccine. Will go there this week.

## 2020-12-29 NOTE — REVIEW OF SYSTEMS
[Depression] : depression [Anxiety] : anxiety [Negative] : Heme/Lymph [de-identified] : hypoglycemia

## 2020-12-29 NOTE — ASSESSMENT
[Diabetes Foot Care] : diabetes foot care [Long Term Vascular Complications] : long term vascular complications of diabetes [Carbohydrate Consistent Diet] : carbohydrate consistent diet [Importance of Diet and Exercise] : importance of diet and exercise to improve glycemic control, achieve weight loss and improve cardiovascular health [Exercise/Effect on Glucose] : exercise/effect on glucose [Hypoglycemia Management] : hypoglycemia management [Glucagon Use] : glucagon use [Self Monitoring of Blood Glucose] : self monitoring of blood glucose [Sick-Day Management] : sick-day management [Retinopathy Screening] : Patient was referred to ophthalmology for retinopathy screening [Diabetic Medications] : Risks and benefits of diabetic medications were discussed

## 2020-12-29 NOTE — REASON FOR VISIT
[Follow - Up] : a follow-up visit [DM Type 2] : DM Type 2 [Hypothyroidism] : hypothyroidism [FreeTextEntry2] : DR. Rico

## 2021-01-08 ENCOUNTER — LABORATORY RESULT (OUTPATIENT)
Age: 31
End: 2021-01-08

## 2021-01-09 LAB — TSH SERPL-ACNC: 7.2 UIU/ML

## 2021-01-13 ENCOUNTER — APPOINTMENT (OUTPATIENT)
Dept: PEDIATRIC DEVELOPMENTAL SERVICES | Facility: CLINIC | Age: 31
End: 2021-01-13
Payer: COMMERCIAL

## 2021-01-13 ENCOUNTER — OUTPATIENT (OUTPATIENT)
Dept: OUTPATIENT SERVICES | Facility: HOSPITAL | Age: 31
LOS: 1 days | Discharge: HOME | End: 2021-01-13
Payer: COMMERCIAL

## 2021-01-13 VITALS
WEIGHT: 169 LBS | TEMPERATURE: 98.3 F | HEIGHT: 63 IN | BODY MASS INDEX: 29.95 KG/M2 | HEART RATE: 85 BPM | OXYGEN SATURATION: 97 % | SYSTOLIC BLOOD PRESSURE: 134 MMHG | DIASTOLIC BLOOD PRESSURE: 80 MMHG

## 2021-01-13 DIAGNOSIS — T50.901A POISONING BY UNSPECIFIED DRUGS, MEDICAMENTS AND BIOLOGICAL SUBSTANCES, ACCIDENTAL (UNINTENTIONAL), INITIAL ENCOUNTER: ICD-10-CM

## 2021-01-13 LAB
24R-OH-CALCIDIOL SERPL-MCNC: 38.5 PG/ML
ALBUMIN SERPL ELPH-MCNC: 4.4 G/DL
ALP BLD-CCNC: 61 U/L
ALT SERPL-CCNC: 12 U/L
ANION GAP SERPL CALC-SCNC: 10 MMOL/L
AST SERPL-CCNC: 13 U/L
BASOPHILS # BLD AUTO: 0.08 K/UL
BASOPHILS NFR BLD AUTO: 1.1 %
BILIRUB SERPL-MCNC: 0.2 MG/DL
BUN SERPL-MCNC: 23 MG/DL
CALCIUM SERPL-MCNC: 9.7 MG/DL
CHLORIDE SERPL-SCNC: 109 MMOL/L
CHOLEST SERPL-MCNC: 124 MG/DL
CO2 SERPL-SCNC: 23 MMOL/L
CREAT SERPL-MCNC: 1.3 MG/DL
EOSINOPHIL # BLD AUTO: 0.19 K/UL
EOSINOPHIL NFR BLD AUTO: 2.5 %
ESTIMATED AVERAGE GLUCOSE: 117 MG/DL
GLUCOSE SERPL-MCNC: 159 MG/DL
HBA1C MFR BLD HPLC: 5.7 %
HCT VFR BLD CALC: 47 %
HDLC SERPL-MCNC: 42 MG/DL
HGB BLD-MCNC: 14.4 G/DL
IMM GRANULOCYTES NFR BLD AUTO: 0.5 %
LDLC SERPL CALC-MCNC: 67 MG/DL
LYMPHOCYTES # BLD AUTO: 2 K/UL
LYMPHOCYTES NFR BLD AUTO: 26.4 %
MAN DIFF?: NORMAL
MCHC RBC-ENTMCNC: 29.9 PG
MCHC RBC-ENTMCNC: 30.6 G/DL
MCV RBC AUTO: 97.7 FL
MONOCYTES # BLD AUTO: 0.83 K/UL
MONOCYTES NFR BLD AUTO: 11 %
NEUTROPHILS # BLD AUTO: 4.43 K/UL
NEUTROPHILS NFR BLD AUTO: 58.5 %
NONHDLC SERPL-MCNC: 82 MG/DL
PLATELET # BLD AUTO: 199 K/UL
POTASSIUM SERPL-SCNC: 4.6 MMOL/L
PROT SERPL-MCNC: 7.6 G/DL
RBC # BLD: 4.81 M/UL
RBC # FLD: 12.7 %
SODIUM SERPL-SCNC: 142 MMOL/L
T3FREE SERPL-MCNC: 2.08 PG/ML
T4 FREE SERPL-MCNC: 1.1 NG/DL
TRIGL SERPL-MCNC: 94 MG/DL
TSH SERPL-ACNC: 7.75 UIU/ML
VIT B12 SERPL-MCNC: 577 PG/ML
WBC # FLD AUTO: 7.57 K/UL

## 2021-01-13 PROCEDURE — 99213 OFFICE O/P EST LOW 20 MIN: CPT | Mod: GC

## 2021-01-13 PROCEDURE — 93010 ELECTROCARDIOGRAM REPORT: CPT

## 2021-01-13 RX ORDER — DEXTRAN 70/HYPROMELLOSE 0.1%-0.3%
0.1-0.3 DROPS OPHTHALMIC (EYE)
Qty: 1 | Refills: 1 | Status: DISCONTINUED | COMMUNITY
Start: 2020-09-28 | End: 2021-01-13

## 2021-01-13 RX ORDER — LEVOTHYROXINE SODIUM 0.07 MG/1
75 TABLET ORAL
Qty: 30 | Refills: 2 | Status: COMPLETED | COMMUNITY
Start: 2020-12-03 | End: 2021-01-13

## 2021-01-13 NOTE — PHYSICAL EXAM
[No Acute Distress] : no acute distress [Well Nourished] : well nourished [Well Developed] : well developed [No Respiratory Distress] : no respiratory distress  [No Accessory Muscle Use] : no accessory muscle use [Clear to Auscultation] : lungs were clear to auscultation bilaterally [Normal Rate] : normal rate  [Regular Rhythm] : with a regular rhythm [Normal S1, S2] : normal S1 and S2 [Soft] : abdomen soft [Non Tender] : non-tender [Non-distended] : non-distended [Normal Bowel Sounds] : normal bowel sounds

## 2021-01-13 NOTE — END OF VISIT
[] : Resident [FreeTextEntry3] : Pt. here for evaluation.  Pt. was accidental given her house mate's medication included aripiprazole 20 mg and mirtazapine 45 mg x1 dose last night.  Pt. has no change in her mental status, no lethargy.  EKG done showed no QTc prolongation.  Will observe.  To ER if pt. has any change in mental status.  Pt. also had TSH 7.2, levothyroxine dose increase from 50mcg to 75mcg daily.  RTC 3/2021.

## 2021-01-13 NOTE — HISTORY OF PRESENT ILLNESS
[Other: _____] : [unfilled] [FreeTextEntry8] : The patient is a 31 year-old female with a PMH of autism, type 2 DM, HLD, hypothyroidism, presenting acutely following a medication error last night at 9 PM. She was inadvertently given aripiprazole 20 mg and mirtazapine 45 mg last night, which were supposed to be given to another resident of the group home. Following administration, her vitals were stable WNL and she did not vomit or exhibit any other signs of toxicity. Since the incident, per group home staff, the patient has had no adverse effects.

## 2021-01-14 DIAGNOSIS — E55.9 VITAMIN D DEFICIENCY, UNSPECIFIED: ICD-10-CM

## 2021-01-14 DIAGNOSIS — F84.0 AUTISTIC DISORDER: ICD-10-CM

## 2021-01-14 DIAGNOSIS — Z00.00 ENCOUNTER FOR GENERAL ADULT MEDICAL EXAMINATION WITHOUT ABNORMAL FINDINGS: ICD-10-CM

## 2021-01-14 DIAGNOSIS — E11.9 TYPE 2 DIABETES MELLITUS WITHOUT COMPLICATIONS: ICD-10-CM

## 2021-01-14 DIAGNOSIS — T50.901A POISONING BY UNSPECIFIED DRUGS, MEDICAMENTS AND BIOLOGICAL SUBSTANCES, ACCIDENTAL (UNINTENTIONAL), INITIAL ENCOUNTER: ICD-10-CM

## 2021-01-14 DIAGNOSIS — E03.9 HYPOTHYROIDISM, UNSPECIFIED: ICD-10-CM

## 2021-04-10 ENCOUNTER — EMERGENCY (EMERGENCY)
Facility: HOSPITAL | Age: 31
LOS: 0 days | Discharge: HOME | End: 2021-04-10
Attending: EMERGENCY MEDICINE | Admitting: EMERGENCY MEDICINE
Payer: COMMERCIAL

## 2021-04-10 VITALS
HEART RATE: 64 BPM | TEMPERATURE: 97 F | OXYGEN SATURATION: 100 % | SYSTOLIC BLOOD PRESSURE: 134 MMHG | RESPIRATION RATE: 20 BRPM | DIASTOLIC BLOOD PRESSURE: 79 MMHG

## 2021-04-10 DIAGNOSIS — E78.5 HYPERLIPIDEMIA, UNSPECIFIED: ICD-10-CM

## 2021-04-10 DIAGNOSIS — Z88.0 ALLERGY STATUS TO PENICILLIN: ICD-10-CM

## 2021-04-10 DIAGNOSIS — F84.0 AUTISTIC DISORDER: ICD-10-CM

## 2021-04-10 DIAGNOSIS — E03.9 HYPOTHYROIDISM, UNSPECIFIED: ICD-10-CM

## 2021-04-10 DIAGNOSIS — E11.9 TYPE 2 DIABETES MELLITUS WITHOUT COMPLICATIONS: ICD-10-CM

## 2021-04-10 DIAGNOSIS — K08.89 OTHER SPECIFIED DISORDERS OF TEETH AND SUPPORTING STRUCTURES: ICD-10-CM

## 2021-04-10 DIAGNOSIS — R22.0 LOCALIZED SWELLING, MASS AND LUMP, HEAD: ICD-10-CM

## 2021-04-10 PROCEDURE — 99284 EMERGENCY DEPT VISIT MOD MDM: CPT

## 2021-04-10 RX ADMIN — Medication 300 MILLIGRAM(S): at 12:08

## 2021-04-10 NOTE — ED PROVIDER NOTE - OBJECTIVE STATEMENT
31 y.o female w/ hx of DM, HLD, hypothyroid, autism presents to the ED for evaluation of facial swelling since yesterday.  Staff noted swelling to L lower jaw since yesterday, worse today prompting visit to the ED.  No known fevers.  I am unable to obtain a comprehensive history, review of systems, past medical history, and/or physical exam due to constraints imposed by pt's non-verbal status.

## 2021-04-10 NOTE — ED PROVIDER NOTE - CLINICAL SUMMARY MEDICAL DECISION MAKING FREE TEXT BOX
Patient presented with atraumatic left lower tooth and jaw swelling. Otherwise afebrile, HD stable, tolerating PO at home, airway patent, clearing secretions without difficulty, no tongue elevation or cervical LAD. No signs of abscess on exam. Consulted dental who evaluated patient in ED - recommended PO clindamycin, outpatient follow up. Given the above, will discharge home with outpatient dental follow up. Patient's aide agreeable with plan. Agrees to return to ED for any new or worsening symptoms.

## 2021-04-10 NOTE — ED PROVIDER NOTE - NSFOLLOWUPCLINICS_GEN_ALL_ED_FT
[facility]  Dental  [address] [place] [id number]  Phone: [phone]  Fax:   Follow Up Time: 1-3 Days

## 2021-04-10 NOTE — ED PROVIDER NOTE - PROGRESS NOTE DETAILS
seen by dental, clear for d/c.  recommends clinda and f/u with dental clinic.  no acute interventions. Discussed results with pt.  All questions were answered and return precautions discussed.  Pt is asx and comfortable at this time.  Unremarkable re-exam.  No further concerns at this time from staff.  Will follow up with dental clinic on monday.  staff understands and agrees with tx plan. staff aware of s/s that warrant return to the ED.

## 2021-04-10 NOTE — ED PROVIDER NOTE - PATIENT PORTAL LINK FT
You can access the FollowMyHealth Patient Portal offered by [facility] by registering at the following website: [email or web address]. By joining [employer]’s FollowMyHealth portal, you will also be able to view your health information using other applications (apps) compatible with our system.

## 2021-04-10 NOTE — ED PROVIDER NOTE - NS ED ROS FT
I am unable to obtain a comprehensive history, review of systems, past medical history, and/or physical exam due to constraints imposed by pt's non-verbal status.

## 2021-04-10 NOTE — CONSULT NOTE ADULT - SUBJECTIVE AND OBJECTIVE BOX
Patient is a 31y old  Female who presents with a chief complaint of left sided facial swelling     HPI: 30 yo female patient with autism presents with aide Issa), has left sided facial swelling starting last night      PAST MEDICAL & SURGICAL HISTORY:  Autism    Hypothyroid    Diabetes    Hyperlipidemia      ( - ) heart valve replacement  ( - ) joint replacement      MEDICATIONS:  Januvia  Levothyroxine  Olanzapine  Priolitazone  Polyethylene  Provastatin  Vitamin D3  Ammonium Lactate  Metformin  Topiramate  Propranolol  Valproic Acid  Lithium      Allergies  Penicillin  Augmentin    Intolerances        FAMILY HISTORY:      *SOCIAL HISTORY: ( - ) Tobacco; ( - ) ETOH    *Last Dental Visit: Aide states that pt's mother will f/u with dentist on Monday    Vital Signs Last 24 Hrs  T(C): 36.2 (10 Apr 2021 10:57), Max: 36.2 (10 Apr 2021 10:57)  T(F): 97.2 (10 Apr 2021 10:57), Max: 97.2 (10 Apr 2021 10:57)  HR: 64 (10 Apr 2021 10:57) (64 - 64)  BP: 134/79 (10 Apr 2021 10:57) (134/79 - 134/79)  BP(mean): --  RR: 20 (10 Apr 2021 10:57) (20 - 20)  SpO2: 100% (10 Apr 2021 10:57) (100% - 100%)    LABS:                  EOE:  TMJ ( - ) clicks                     ( - ) pops                     ( - ) crepitus             Mandible FROM             Facial bones and MOM grossly intact             ( - ) trismus             ( - ) lymphadenopathy             ( + ) swelling: slight facial swelling on left posterior mandibular area             ( + ) asymmetry             ( - ) palpation             ( - ) dyspnea             ( - ) dysphagia             ( - ) loss of consciousness    IOE:   permanent dentition: heavy generalized calculus buildup           tongue/FOM No pathology noted           labial/buccal mucosa No pathology noted           (   ) percussion: pt uncooperative for intra-oral exam/pain assessment but no noted intra-oral swelling or abscess noted on quick visual exam           (   ) palpation           ( - ) swelling            ( - ) abscess           (   ) sinus tract          *DENTAL RADIOGRAPHS: no    RADIOLOGY & ADDITIONAL STUDIES:    *ASSESSMENT: heavy generalized calculus buildup      *PLAN: Rx abx, pt to follow up with dentist on Monday with lower left posterior slight facial swelling starting last night.  breathing. Aide understands    PROCEDURE:   Pt presents with aide (Grisel). Aide states that pt's mother states that "lower left wisdom tooth needs to be extracted but has not been done yet" and since swelling started last night, came in for antibiotics until pt can f/u with dentist on Monday. Pt has autism and is non-verbal. Aide states it is unknown if pt is experiencing pain. Clinical exam done with difficulty due to uncooperative behavior. Pt afebrile, no trismus or dysphagia suspected. No intra-oral swelling or abscess noted on visual exam. Pt uncooperative for percussion/palpation testing. Visual exam reveals heavy calculus build-up in LL molars. Aide informed that pt would need radiographic exam/assessment and definitive tx with the dentist pt will f/u with on monday. Rx Clindamycin 300mg q6hrs x 1wk. Pt has Tylenol prescribed prn for pain as per aide and medications list aide presents. Aide instructed that pt should return to ED should she develop fever, dysphagia, trouble breathing. Aide understands    RECOMMENDATIONS:  1) Rx Clindamycin 300mg every 6 hours for 1 week  2) Dental F/U with outpatient dentist for comprehensive dental care.   3) If any difficulty swallowing/breathing, fever occur, return to ER.     Nasrin Martinez DDS, Pager #[phone]

## 2021-04-10 NOTE — ED PROVIDER NOTE - ATTENDING CONTRIBUTION TO CARE
31 year old female, pmhx as documented presenting with facial swelling on the left since yesterday. Patient unable to provide history. Per aide, no known fevers, N/V or any other symptoms.    Vital Signs: I have reviewed the initial vital signs.  Constitutional: NAD, well-nourished, appears stated age, no acute distress.  HEENT: Airway patent, moist MM, no erythema/swelling/deformity of oral structures. EOMI, PERRLA. (+) mild swelling L lower mandible, decay to L bottom molar, + TTP, no obvious abscess  CV: regular rate, regular rhythm, well-perfused extremities, 2+ b/l DP and radial pulses equal.  Lungs: BCTA, no increased WOB.  ABD: NTND, no guarding or rebound, no pulsatile mass, no hernias.   MSK: Neck supple, nontender, nl ROM, no stepoff. Chest nontender. Back nontender in TLS spine or to b/l bony structures or flanks. Ext nontender, nl rom, no deformity.   INTEG: Skin warm, dry, no rash.  NEURO: at baseline mental status, no focal deficits    Will consult dental, re-eval.

## 2021-04-10 NOTE — ED PROVIDER NOTE - PHYSICAL EXAMINATION
CONST: Well appearing in NAD  EYES: Sclera and conjunctiva clear.  ENT: mild swelling L lower mandible, decay to L bottom molar, + TTP, no obvious abscess, No nasal discharge. no erythema or exudates. Uvula midline.  NECK: Non-tender, no meningeal signs, supple  SKIN: Warm, dry, no acute rashes. Good turgor  NEURO: moving all extremities, verbalizing, no focal deficits. CONST: Well appearing in NAD  EYES: Sclera and conjunctiva clear.  ENT: mild swelling L lower mandible, decay to L bottom molar, + TTP, no obvious abscess, No nasal discharge, pt not cooperative looking at posterior pharynx, but visualized potions of pharynx unremarkable.  no changes in speech per staff. no signs of PTA  NECK: Non-tender, no meningeal signs, supple  SKIN: Warm, dry, no acute rashes. Good turgor  NEURO: moving all extremities, verbalizing, no focal deficits.

## 2021-04-12 ENCOUNTER — OUTPATIENT (OUTPATIENT)
Dept: OUTPATIENT SERVICES | Facility: HOSPITAL | Age: 31
LOS: 1 days | Discharge: HOME | End: 2021-04-12

## 2021-04-12 PROBLEM — F84.0 AUTISTIC DISORDER: Chronic | Status: ACTIVE | Noted: 2021-04-10

## 2021-04-12 PROBLEM — E11.9 TYPE 2 DIABETES MELLITUS WITHOUT COMPLICATIONS: Chronic | Status: ACTIVE | Noted: 2021-04-10

## 2021-04-12 PROBLEM — E03.9 HYPOTHYROIDISM, UNSPECIFIED: Chronic | Status: ACTIVE | Noted: 2021-04-10

## 2021-04-29 ENCOUNTER — APPOINTMENT (OUTPATIENT)
Dept: PEDIATRIC DEVELOPMENTAL SERVICES | Facility: CLINIC | Age: 31
End: 2021-04-29

## 2021-06-09 ENCOUNTER — APPOINTMENT (OUTPATIENT)
Dept: PEDIATRIC DEVELOPMENTAL SERVICES | Facility: CLINIC | Age: 31
End: 2021-06-09
Payer: COMMERCIAL

## 2021-06-09 ENCOUNTER — OUTPATIENT (OUTPATIENT)
Dept: OUTPATIENT SERVICES | Facility: HOSPITAL | Age: 31
LOS: 1 days | Discharge: HOME | End: 2021-06-09

## 2021-06-09 VITALS
BODY MASS INDEX: 30.12 KG/M2 | HEART RATE: 81 BPM | HEIGHT: 63 IN | TEMPERATURE: 96.3 F | DIASTOLIC BLOOD PRESSURE: 88 MMHG | SYSTOLIC BLOOD PRESSURE: 122 MMHG | WEIGHT: 170 LBS

## 2021-06-09 DIAGNOSIS — E11.9 TYPE 2 DIABETES MELLITUS WITHOUT COMPLICATIONS: ICD-10-CM

## 2021-06-09 DIAGNOSIS — E55.9 VITAMIN D DEFICIENCY, UNSPECIFIED: ICD-10-CM

## 2021-06-09 DIAGNOSIS — E78.00 PURE HYPERCHOLESTEROLEMIA, UNSPECIFIED: ICD-10-CM

## 2021-06-09 DIAGNOSIS — Z00.00 ENCOUNTER FOR GENERAL ADULT MEDICAL EXAMINATION WITHOUT ABNORMAL FINDINGS: ICD-10-CM

## 2021-06-09 DIAGNOSIS — K59.00 CONSTIPATION, UNSPECIFIED: ICD-10-CM

## 2021-06-09 DIAGNOSIS — E03.9 HYPOTHYROIDISM, UNSPECIFIED: ICD-10-CM

## 2021-06-09 DIAGNOSIS — F84.0 AUTISTIC DISORDER: ICD-10-CM

## 2021-06-09 PROCEDURE — 99214 OFFICE O/P EST MOD 30 MIN: CPT | Mod: GC

## 2021-06-09 NOTE — END OF VISIT
[] : Resident [FreeTextEntry3] : Pt. here for follow up.  Pt. seen by endo, metformin dose decrease to once daily.  Will order fasting CMP, CBC, lipid profile, A1C, TSH, vitamin D.  RTC 3 months

## 2021-06-09 NOTE — HISTORY OF PRESENT ILLNESS
[de-identified] : The patient is a 31 year-old female with a PMH of autism, type 2 DM, HLD, hypothyroidism, presenting in office for follow up.\par Patient accompanied by Ms. Marisa Romano (group home staff).\par Staff reports no new complaints or events recently.

## 2021-06-09 NOTE — ASSESSMENT
[FreeTextEntry1] : The patient is a 31 year-old female with a PMH of autism, type 2 DM, HLD, hypothyroidism, presenting in office for follow up.\par \par \par # Autism\par # Mood disorder\par - on Divalproex, Lithium, Nuedexta, Olanzapine, Topiramate, Propranolol.\par - Psych f/u\par \par # DM\par - last A1C = 5.7 noted.\par - on Januvia, Metformin and Pioglitazone\par - monitor FS at group home.\par - endo f/u\par \par # Hypothyroidism\par - Last TSH 7.2 noted\par - Levothyroxine increased to 88 mcg\par - repeat TSH\par - endo f/u\par \par # HLD\par - c/w Pravastatin 40 mg\par - repeat lipid profile noted, controlled.\par \par # Vitamin D deficiency\par - resolved on repeat vitamin d levels.\par - c/w vitamin D supplements.\par \par # Constipation\par - c/w Colace and Miralax\par \par \par # HCM\par covid vaccine: pt has received 2 shots of covid vaccine but staff does not know the dates.\par Pap smear: per staff pt had pap smear done recently. staff requested to bring documentation for records.\par Routine labs ordered\par RTC in 3 months or PRN

## 2021-06-14 ENCOUNTER — NON-APPOINTMENT (OUTPATIENT)
Age: 31
End: 2021-06-14

## 2021-06-18 ENCOUNTER — OUTPATIENT (OUTPATIENT)
Dept: OUTPATIENT SERVICES | Facility: HOSPITAL | Age: 31
LOS: 1 days | Discharge: HOME | End: 2021-06-18
Payer: COMMERCIAL

## 2021-06-18 VITALS
DIASTOLIC BLOOD PRESSURE: 66 MMHG | HEART RATE: 62 BPM | WEIGHT: 171.08 LBS | HEIGHT: 63 IN | SYSTOLIC BLOOD PRESSURE: 113 MMHG | TEMPERATURE: 97 F | OXYGEN SATURATION: 100 % | RESPIRATION RATE: 16 BRPM

## 2021-06-18 DIAGNOSIS — Z01.818 ENCOUNTER FOR OTHER PREPROCEDURAL EXAMINATION: ICD-10-CM

## 2021-06-18 DIAGNOSIS — K02.9 DENTAL CARIES, UNSPECIFIED: ICD-10-CM

## 2021-06-18 LAB
ALBUMIN SERPL ELPH-MCNC: 4.1 G/DL — SIGNIFICANT CHANGE UP (ref 3.5–5.2)
ALP SERPL-CCNC: 62 U/L — SIGNIFICANT CHANGE UP (ref 30–115)
ALT FLD-CCNC: 9 U/L — SIGNIFICANT CHANGE UP (ref 0–41)
ANION GAP SERPL CALC-SCNC: 10 MMOL/L — SIGNIFICANT CHANGE UP (ref 7–14)
AST SERPL-CCNC: 12 U/L — SIGNIFICANT CHANGE UP (ref 0–41)
BASOPHILS # BLD AUTO: 0.09 K/UL — SIGNIFICANT CHANGE UP (ref 0–0.2)
BASOPHILS NFR BLD AUTO: 1.1 % — HIGH (ref 0–1)
BILIRUB SERPL-MCNC: 0.3 MG/DL — SIGNIFICANT CHANGE UP (ref 0.2–1.2)
BUN SERPL-MCNC: 22 MG/DL — HIGH (ref 10–20)
CALCIUM SERPL-MCNC: 9.4 MG/DL — SIGNIFICANT CHANGE UP (ref 8.5–10.1)
CHLORIDE SERPL-SCNC: 109 MMOL/L — SIGNIFICANT CHANGE UP (ref 98–110)
CO2 SERPL-SCNC: 21 MMOL/L — SIGNIFICANT CHANGE UP (ref 17–32)
CREAT SERPL-MCNC: 1.1 MG/DL — SIGNIFICANT CHANGE UP (ref 0.7–1.5)
EOSINOPHIL # BLD AUTO: 0.16 K/UL — SIGNIFICANT CHANGE UP (ref 0–0.7)
EOSINOPHIL NFR BLD AUTO: 2 % — SIGNIFICANT CHANGE UP (ref 0–8)
GLUCOSE SERPL-MCNC: 136 MG/DL — HIGH (ref 70–99)
HCT VFR BLD CALC: 43.1 % — SIGNIFICANT CHANGE UP (ref 37–47)
HGB BLD-MCNC: 14 G/DL — SIGNIFICANT CHANGE UP (ref 12–16)
IMM GRANULOCYTES NFR BLD AUTO: 0.6 % — HIGH (ref 0.1–0.3)
LYMPHOCYTES # BLD AUTO: 1.96 K/UL — SIGNIFICANT CHANGE UP (ref 1.2–3.4)
LYMPHOCYTES # BLD AUTO: 24.2 % — SIGNIFICANT CHANGE UP (ref 20.5–51.1)
MCHC RBC-ENTMCNC: 30.7 PG — SIGNIFICANT CHANGE UP (ref 27–31)
MCHC RBC-ENTMCNC: 32.5 G/DL — SIGNIFICANT CHANGE UP (ref 32–37)
MCV RBC AUTO: 94.5 FL — SIGNIFICANT CHANGE UP (ref 81–99)
MONOCYTES # BLD AUTO: 0.84 K/UL — HIGH (ref 0.1–0.6)
MONOCYTES NFR BLD AUTO: 10.4 % — HIGH (ref 1.7–9.3)
NEUTROPHILS # BLD AUTO: 4.99 K/UL — SIGNIFICANT CHANGE UP (ref 1.4–6.5)
NEUTROPHILS NFR BLD AUTO: 61.7 % — SIGNIFICANT CHANGE UP (ref 42.2–75.2)
NRBC # BLD: 0 /100 WBCS — SIGNIFICANT CHANGE UP (ref 0–0)
PLATELET # BLD AUTO: 184 K/UL — SIGNIFICANT CHANGE UP (ref 130–400)
POTASSIUM SERPL-MCNC: 4.4 MMOL/L — SIGNIFICANT CHANGE UP (ref 3.5–5)
POTASSIUM SERPL-SCNC: 4.4 MMOL/L — SIGNIFICANT CHANGE UP (ref 3.5–5)
PROT SERPL-MCNC: 7.2 G/DL — SIGNIFICANT CHANGE UP (ref 6–8)
RBC # BLD: 4.56 M/UL — SIGNIFICANT CHANGE UP (ref 4.2–5.4)
RBC # FLD: 11.9 % — SIGNIFICANT CHANGE UP (ref 11.5–14.5)
SODIUM SERPL-SCNC: 140 MMOL/L — SIGNIFICANT CHANGE UP (ref 135–146)
WBC # BLD: 8.09 K/UL — SIGNIFICANT CHANGE UP (ref 4.8–10.8)
WBC # FLD AUTO: 8.09 K/UL — SIGNIFICANT CHANGE UP (ref 4.8–10.8)

## 2021-06-18 PROCEDURE — 93010 ELECTROCARDIOGRAM REPORT: CPT

## 2021-06-18 NOTE — H&P PST ADULT - NSICDXPASTMEDICALHX_GEN_ALL_CORE_FT
PAST MEDICAL HISTORY:  Autism     Bipolar mood disorder     Diabetes     History of constipation     Hypertension     Hypothyroid

## 2021-06-18 NOTE — H&P PST ADULT - HISTORY OF PRESENT ILLNESS
at the time pt was brought it H&p completed by mother was not received via fax; information was obtained through chart and pt's aide: Remi  unsure of past HX with anesthesia; pt is from a group home; received 2 doses of COVID-19 vaccine; they have a date for covid swab test; pt couldn't be found on [facility]; pt saw a rheumatologist in 2017; has reynaud's but no RA; lungs sounded clear; pt is non-verbal; screams but was cooperative    Anesthesia Alert  NO--Difficult Airway  NO--History of neck surgery or radiation  NO--Limited ROM of neck  NO--History of Malignant hyperthermia  NO--Personal or family history of Pseudocholinesterase deficiency.  NO--Prior Anesthesia Complication  NO--Latex Allergy  NO--Loose teeth  NO--History of Rheumatoid Arthritis  NO--CHRIS  NO--Bleeding risk  NO--Other_____    Patient denies any signs or symptoms of COVID 19 and denies contact with known positive individuals.  They have an appointment for repeat COVID testing pre-procedure and acknowledge its time and place.  They were instructed to quarantine pre-procedure, practice exposure control measures, continue to self-monitor and report any concerns to their proceduralist.    Mallampati: 4     FOS: < 1

## 2021-06-18 NOTE — H&P PST ADULT - NEUROLOGICAL
Coronary artery disease involving native coronary artery of native heart with unstable angina pectoris    GERD (gastroesophageal reflux disease)    Hyperlipemia    Hypertension    Smoker unmotivated to quit Alert & oriented; no sensory, motor or coordination deficits, normal reflexes

## 2021-06-18 NOTE — H&P PST ADULT - REASON FOR ADMISSION
Patient is a ___31__ year old ___female presenting to PAST in preparation for __compl;ete oral rehabilitation under general anethesia____ on ___07/09/21___ under ___general____ anesthesia by  __Mohsen_______ . Patient is a ___31__ year old ___female presenting to PAST in preparation for __complete oral rehabilitation under general anethesia____ on ___07/09/21___ under ___general____ anesthesia by  __Mohsen_______ .

## 2021-06-21 LAB
ALBUMIN SERPL ELPH-MCNC: 4.2 G/DL
ALP BLD-CCNC: 71 U/L
ALT SERPL-CCNC: 9 U/L
ANION GAP SERPL CALC-SCNC: 13 MMOL/L
AST SERPL-CCNC: 12 U/L
BASOPHILS # BLD AUTO: 0.11 K/UL
BASOPHILS NFR BLD AUTO: 1.3 %
BILIRUB SERPL-MCNC: 0.3 MG/DL
BUN SERPL-MCNC: 23 MG/DL
CALCIUM SERPL-MCNC: 9.6 MG/DL
CHLORIDE SERPL-SCNC: 109 MMOL/L
CHOLEST SERPL-MCNC: 118 MG/DL
CO2 SERPL-SCNC: 19 MMOL/L
CREAT SERPL-MCNC: 1.3 MG/DL
EOSINOPHIL # BLD AUTO: 0.25 K/UL
EOSINOPHIL NFR BLD AUTO: 2.9 %
GLUCOSE SERPL-MCNC: 145 MG/DL
HCT VFR BLD CALC: 45.9 %
HDLC SERPL-MCNC: 47 MG/DL
HGB BLD-MCNC: 14.6 G/DL
IMM GRANULOCYTES NFR BLD AUTO: 0.7 %
LDLC SERPL CALC-MCNC: 59 MG/DL
LYMPHOCYTES # BLD AUTO: 2.13 K/UL
LYMPHOCYTES NFR BLD AUTO: 25 %
MAN DIFF?: NORMAL
MCHC RBC-ENTMCNC: 30.9 PG
MCHC RBC-ENTMCNC: 31.8 G/DL
MCV RBC AUTO: 97 FL
MONOCYTES # BLD AUTO: 0.81 K/UL
MONOCYTES NFR BLD AUTO: 9.5 %
NEUTROPHILS # BLD AUTO: 5.17 K/UL
NEUTROPHILS NFR BLD AUTO: 60.6 %
NONHDLC SERPL-MCNC: 71 MG/DL
PLATELET # BLD AUTO: 209 K/UL
POTASSIUM SERPL-SCNC: 4.3 MMOL/L
PROT SERPL-MCNC: 7.5 G/DL
RBC # BLD: 4.73 M/UL
RBC # FLD: 12.1 %
SODIUM SERPL-SCNC: 141 MMOL/L
TRIGL SERPL-MCNC: 65 MG/DL
WBC # FLD AUTO: 8.53 K/UL

## 2021-06-22 LAB — TSH SERPL-ACNC: 2.42 UIU/ML

## 2021-06-30 PROBLEM — I10 ESSENTIAL (PRIMARY) HYPERTENSION: Chronic | Status: ACTIVE | Noted: 2021-06-18

## 2021-06-30 PROBLEM — Z87.19 PERSONAL HISTORY OF OTHER DISEASES OF THE DIGESTIVE SYSTEM: Chronic | Status: ACTIVE | Noted: 2021-06-18

## 2021-06-30 PROBLEM — F31.9 BIPOLAR DISORDER, UNSPECIFIED: Chronic | Status: ACTIVE | Noted: 2021-06-18

## 2021-07-06 ENCOUNTER — APPOINTMENT (OUTPATIENT)
Dept: PEDIATRIC DEVELOPMENTAL SERVICES | Facility: CLINIC | Age: 31
End: 2021-07-06
Payer: COMMERCIAL

## 2021-07-06 ENCOUNTER — OUTPATIENT (OUTPATIENT)
Dept: OUTPATIENT SERVICES | Facility: HOSPITAL | Age: 31
LOS: 1 days | Discharge: HOME | End: 2021-07-06

## 2021-07-06 ENCOUNTER — NON-APPOINTMENT (OUTPATIENT)
Age: 31
End: 2021-07-06

## 2021-07-06 VITALS
HEIGHT: 63 IN | WEIGHT: 173 LBS | BODY MASS INDEX: 30.65 KG/M2 | TEMPERATURE: 97.9 F | DIASTOLIC BLOOD PRESSURE: 90 MMHG | SYSTOLIC BLOOD PRESSURE: 131 MMHG | OXYGEN SATURATION: 98 % | HEART RATE: 86 BPM

## 2021-07-06 DIAGNOSIS — Z01.818 ENCOUNTER FOR OTHER PREPROCEDURAL EXAMINATION: ICD-10-CM

## 2021-07-06 DIAGNOSIS — F84.0 AUTISTIC DISORDER: ICD-10-CM

## 2021-07-06 DIAGNOSIS — Z00.00 ENCOUNTER FOR GENERAL ADULT MEDICAL EXAMINATION WITHOUT ABNORMAL FINDINGS: ICD-10-CM

## 2021-07-06 DIAGNOSIS — Z11.59 ENCOUNTER FOR SCREENING FOR OTHER VIRAL DISEASES: ICD-10-CM

## 2021-07-06 DIAGNOSIS — E11.9 TYPE 2 DIABETES MELLITUS WITHOUT COMPLICATIONS: ICD-10-CM

## 2021-07-06 PROCEDURE — 99213 OFFICE O/P EST LOW 20 MIN: CPT | Mod: GC

## 2021-07-06 NOTE — HISTORY OF PRESENT ILLNESS
[FreeTextEntry1] : Pre-operative Clearance  [de-identified] : 31 F PMH Type II DM, Autism, Hypothyroidism with a PMH of autism, type 2 DM, HLD, hypothyroidism, presenting in office for follow up.  \par Patient accompanied by group staff member Damaris. Patient unable to give history, provided by Damaris. States no changes at group home. Patient having regular bowel movements, behavior is at her baseline.

## 2021-07-06 NOTE — END OF VISIT
[] : Resident [FreeTextEntry3] : Pt. here for medical evaluation prior to complete oral rehab under anesthesia.  Pt. is at low risk for COR under anesthesia, needs to hold diabetic meds. the day of procedure, and resume after procedure once pt. starts eating.  RTC 3 months

## 2021-07-06 NOTE — ASSESSMENT
[FreeTextEntry1] : 31 F PMH of autism, type 2 DM, HLD, hypothyroidism, presenting in office for pre-operative clearance \par \par #Poor Dentition Requiring intervention with GA/ Pre-operative Clearance\par -will fill out paperwork\par -referral for routine COVID-19 swab \par -hold metformin 1 day prior to procedure\par \par # Autism\par # Mood disorder\par - on Divalproex, Lithium, Nuedexta, Olanzapine, Topiramate, Propranolol.\par - can f/u outpatient\par \par #DM- well controlled. c/w Januvia, Metformin, Pioglitazone. \par -Hold Metformin prior to procedure\par -endo f/u op\par \par # Hypothyroidism\par - Levothyroxine was to 88 mcg\par -f/u endo \par \par # HLD\par - c/w Pravastatin 40 mg\par \par # Vitamin D deficiency\par - c/w vitamin D supplements.\par \par # Constipation\par - c/w Colace and Miralax\par \par # HCM\par covid vaccine: pt has received 2 shots of covid vaccine but staff does not know the dates.\par Pap smear: per staff pt had pap smear done recently. staff requested to bring documentation for records.\par Routine labs ordered

## 2021-07-06 NOTE — PHYSICAL EXAM
[No Acute Distress] : no acute distress [Well Developed] : well developed [EOMI] : extraocular movements intact [No JVD] : no jugular venous distention [No Respiratory Distress] : no respiratory distress  [Normal S1, S2] : normal S1 and S2 [No Edema] : there was no peripheral edema [Soft] : abdomen soft [Non Tender] : non-tender [No HSM] : no HSM [No Joint Swelling] : no joint swelling [de-identified] : stigmata of intellectual disability noted  [de-identified] : awake, responsive, unable to communicate  [de-identified] : slightly agitated

## 2021-07-08 NOTE — ASU PATIENT PROFILE, ADULT - PMH
Autism    Bipolar mood disorder    Diabetes    History of constipation    Hypertension    Hypothyroid

## 2021-07-08 NOTE — ASU PATIENT PROFILE, ADULT - NSTRANFUSIONPLAN_GEN_ALL_CORE_SIUH
Progress Notes by Baylee Aguirre RMA at 12/13/17 02:57 PM     Author:  Baylee Aguirre RMA Service:  (none) Author Type:  Certified Medical Assistant     Filed:  12/13/17 02:57 PM Encounter Date:  12/9/2017 Status:  Signed     :  Baylee Aguirre RMA (Certified Medical Assistant)            Patient notified via [employer].  [PR1.1M]    Revision History        User Key Date/Time User Provider Type Action    > PR1.1 12/13/17 02:57 PM Baylee Aguirre RMA Certified Medical Assistant Sign    M - Manual            
not applicable

## 2021-07-08 NOTE — ASU PATIENT PROFILE, ADULT - PT NEEDS ASSIST
Dx: Acute bilateral low back pain without sciatica (M54.5)            Insurance (Authorized # of Visits):  [place] (100 visit limit)           Authorizing Physician: Dr. Jose Roberto Hernandez  Next MD visit: none scheduled  Fall Risk: standard         Precautions: n/a wall squats, bridge    Charges: Manual x 1, TherEx x 2       Total Timed Treatment: 45 min  Total Treatment Time: 45 min no

## 2021-07-09 ENCOUNTER — OUTPATIENT (OUTPATIENT)
Dept: OUTPATIENT SERVICES | Facility: HOSPITAL | Age: 31
LOS: 1 days | Discharge: HOME | End: 2021-07-09

## 2021-07-09 VITALS
TEMPERATURE: 99 F | HEIGHT: 63 IN | HEART RATE: 70 BPM | OXYGEN SATURATION: 100 % | RESPIRATION RATE: 18 BRPM | DIASTOLIC BLOOD PRESSURE: 83 MMHG | WEIGHT: 171.08 LBS | SYSTOLIC BLOOD PRESSURE: 150 MMHG

## 2021-07-09 VITALS
HEART RATE: 72 BPM | OXYGEN SATURATION: 97 % | RESPIRATION RATE: 15 BRPM | SYSTOLIC BLOOD PRESSURE: 150 MMHG | DIASTOLIC BLOOD PRESSURE: 89 MMHG

## 2021-07-09 DIAGNOSIS — K02.9 DENTAL CARIES, UNSPECIFIED: ICD-10-CM

## 2021-07-09 RX ORDER — CHOLECALCIFEROL (VITAMIN D3) 125 MCG
2000 CAPSULE ORAL
Qty: 0 | Refills: 0 | DISCHARGE

## 2021-07-09 RX ORDER — METOCLOPRAMIDE HCL 10 MG
10 TABLET ORAL ONCE
Refills: 0 | Status: DISCONTINUED | OUTPATIENT
Start: 2021-07-09 | End: 2021-07-23

## 2021-07-09 RX ORDER — LEVOTHYROXINE SODIUM 125 MCG
1 TABLET ORAL
Qty: 0 | Refills: 0 | DISCHARGE

## 2021-07-09 RX ORDER — LITHIUM CARBONATE 300 MG/1
300 TABLET, EXTENDED RELEASE ORAL
Qty: 0 | Refills: 0 | DISCHARGE

## 2021-07-09 RX ORDER — ONDANSETRON 8 MG/1
4 TABLET, FILM COATED ORAL ONCE
Refills: 0 | Status: DISCONTINUED | OUTPATIENT
Start: 2021-07-09 | End: 2021-07-23

## 2021-07-09 RX ORDER — DEXAMETHASONE 0.5 MG/5ML
8 ELIXIR ORAL ONCE
Refills: 0 | Status: DISCONTINUED | OUTPATIENT
Start: 2021-07-09 | End: 2021-07-23

## 2021-07-09 RX ORDER — SITAGLIPTIN 50 MG/1
1 TABLET, FILM COATED ORAL
Qty: 0 | Refills: 0 | DISCHARGE

## 2021-07-09 RX ORDER — PIOGLITAZONE HYDROCHLORIDE 15 MG/1
1 TABLET ORAL
Qty: 0 | Refills: 0 | DISCHARGE

## 2021-07-09 RX ORDER — PROPRANOLOL HCL 160 MG
1 CAPSULE, EXTENDED RELEASE 24HR ORAL
Qty: 0 | Refills: 0 | DISCHARGE

## 2021-07-09 RX ORDER — DOCUSATE SODIUM 100 MG
100 CAPSULE ORAL
Qty: 0 | Refills: 0 | DISCHARGE

## 2021-07-09 RX ORDER — MIDAZOLAM HYDROCHLORIDE 1 MG/ML
15 INJECTION, SOLUTION INTRAMUSCULAR; INTRAVENOUS ONCE
Refills: 0 | Status: DISCONTINUED | OUTPATIENT
Start: 2021-07-09 | End: 2021-07-09

## 2021-07-09 RX ORDER — HYDROMORPHONE HYDROCHLORIDE 2 MG/ML
0.5 INJECTION INTRAMUSCULAR; INTRAVENOUS; SUBCUTANEOUS
Refills: 0 | Status: DISCONTINUED | OUTPATIENT
Start: 2021-07-09 | End: 2021-07-09

## 2021-07-09 RX ORDER — SODIUM CHLORIDE 9 MG/ML
1000 INJECTION, SOLUTION INTRAVENOUS
Refills: 0 | Status: DISCONTINUED | OUTPATIENT
Start: 2021-07-09 | End: 2021-07-23

## 2021-07-09 RX ORDER — METFORMIN HYDROCHLORIDE 850 MG/1
1 TABLET ORAL
Qty: 0 | Refills: 0 | DISCHARGE

## 2021-07-09 RX ADMIN — MIDAZOLAM HYDROCHLORIDE 15 MILLIGRAM(S): 1 INJECTION, SOLUTION INTRAMUSCULAR; INTRAVENOUS at 07:23

## 2021-07-09 RX ADMIN — SODIUM CHLORIDE 100 MILLILITER(S): 9 INJECTION, SOLUTION INTRAVENOUS at 11:32

## 2021-07-09 NOTE — ASU DISCHARGE PLAN (ADULT/PEDIATRIC) - ASU DC SPECIAL INSTRUCTIONSFT
Nothing hard/crunchy, nothing hot/spicy, nothing through a straw, no spitting for at least 48 hours. Soft/liquid diet, progress slowly. If any concerns, please contact dental resident on call [phone]

## 2021-07-09 NOTE — BRIEF OPERATIVE NOTE - OPERATION/FINDINGS
Complete Oral Rehabilitation included:  Full mouth Exam, 18 xrays, Prophylaxis and  Fluoride treatment  Extractions of teeth: 16, 17, 32  Composite restorations of teeth: 3, 30  Amalgam restorations of teeth: 15, 31

## 2021-07-09 NOTE — ASU DISCHARGE PLAN (ADULT/PEDIATRIC) - CALL YOUR DOCTOR IF YOU HAVE ANY OF THE FOLLOWING:
Bleeding that does not stop/Swelling that gets worse/Pain not relieved by Medications/Fever greater than (need to indicate Fahrenheit or Celsius)/Numbness, tingling, color or temperature change to extremity/Nausea and vomiting that does not stop/Excessive diarrhea/Inability to tolerate liquids or foods/Increased irritability or sluggishness

## 2021-07-09 NOTE — CHART NOTE - NSCHARTNOTEFT_GEN_A_CORE
PACU ANESTHESIA ADMISSION NOTE      Procedure: Dental exam, with x-ray imaging, dental cleaning, and restoration      Post op diagnosis:  Dental caries    __x__  Patent Airway    __x__  Full return of protective reflexes    _x___  Full recovery from anesthesia / back to baseline     Vitals:   see anesthesia record      Mental Status:  __x__ Awake   _____ Alert   _____ Drowsy   _____ Sedated    Nausea/Vomiting:  __x__ NO  ______Yes,   See Post - Op Orders          Pain Scale (0-10):  _____    Treatment: ____ None    ___x_ See Post - Op/PCA Orders    Post - Operative Fluids:   ____ Oral   __x__ See Post - Op Orders    Plan: Discharge:   __x__Home       _____Floor     _____Critical Care    _____  Other:_________________    Comments:  Uneventful intraoperative course. No anesthesia issues or complications noted. Patient stable upon arrival to PACU. Report given to RN. Discharge when criteria met.

## 2021-07-14 ENCOUNTER — OUTPATIENT (OUTPATIENT)
Dept: OUTPATIENT SERVICES | Facility: HOSPITAL | Age: 31
LOS: 1 days | Discharge: HOME | End: 2021-07-14

## 2021-07-20 DIAGNOSIS — Z88.0 ALLERGY STATUS TO PENICILLIN: ICD-10-CM

## 2021-07-20 DIAGNOSIS — K02.9 DENTAL CARIES, UNSPECIFIED: ICD-10-CM

## 2021-07-24 ENCOUNTER — LABORATORY RESULT (OUTPATIENT)
Age: 31
End: 2021-07-24

## 2021-07-27 ENCOUNTER — APPOINTMENT (OUTPATIENT)
Dept: ENDOCRINOLOGY | Facility: CLINIC | Age: 31
End: 2021-07-27
Payer: COMMERCIAL

## 2021-07-27 VITALS
WEIGHT: 171 LBS | HEIGHT: 63 IN | HEART RATE: 78 BPM | DIASTOLIC BLOOD PRESSURE: 72 MMHG | OXYGEN SATURATION: 98 % | TEMPERATURE: 97.3 F | BODY MASS INDEX: 30.3 KG/M2 | SYSTOLIC BLOOD PRESSURE: 122 MMHG | RESPIRATION RATE: 18 BRPM

## 2021-07-27 PROCEDURE — 99214 OFFICE O/P EST MOD 30 MIN: CPT

## 2021-07-27 RX ORDER — PIOGLITAZONE HYDROCHLORIDE 15 MG/1
15 TABLET ORAL
Qty: 30 | Refills: 5 | Status: COMPLETED | COMMUNITY
Start: 2020-07-14 | End: 2021-07-27

## 2021-07-27 NOTE — REASON FOR VISIT
[Follow - Up] : a follow-up visit [DM Type 2] : DM Type 2 [Weight Management/Obesity] : weight management/obesity [Formal Caregiver] : formal caregiver [FreeTextEntry2] : Dr. Rico

## 2021-07-27 NOTE — REVIEW OF SYSTEMS
[Negative] : Heme/Lymph [FreeTextEntry2] : had recent teeth work and infection, glucose elevated and started with insulin Lantus 10 units q hs [de-identified] : autistic

## 2021-07-27 NOTE — HISTORY OF PRESENT ILLNESS
[Finger Stick] : Finger Stick: Yes [Continuous Glucose Monitoring] : Continuous Glucose Monitoring: No [Hypoglycemia] : Patient is not hypoglycemic. [FreeTextEntry9] : 160-188 [FreeTextEntry8] : 960-031 [FreeTextEntry1] : testing 1-3 x a day

## 2021-07-27 NOTE — THERAPY
[Today's Date] : [unfilled] [Lantus] : Lantus [BG Target = ____] : BG Target = [unfilled] [FreeTextEntry9] : lantus 10 units increase to 13 units [FreeTextEntry7] : Januvia 10 0mg daily, metformin 1000 mg daily, increase to bid and pioglitazone increase to 30 mg daily

## 2021-07-27 NOTE — PHYSICAL EXAM
[Alert] : alert [Well Nourished] : well nourished [Healthy Appearance] : healthy appearance [No Acute Distress] : no acute distress [Well Developed] : well developed [Normal Sclera/Conjunctiva] : normal sclera/conjunctiva [EOMI] : extra ocular movement intact [No Proptosis] : no proptosis [Normal Outer Ear/Nose] : the ears and nose were normal in appearance [Normal Oropharynx] : the oropharynx was normal [No Respiratory Distress] : no respiratory distress [No Accessory Muscle Use] : no accessory muscle use [Clear to Auscultation] : lungs were clear to auscultation bilaterally [Normal S1, S2] : normal S1 and S2 [Normal Rate] : heart rate was normal [Regular Rhythm] : with a regular rhythm [Carotids Normal] : carotid pulses were normal with no bruits [No Edema] : no peripheral edema [Pedal Pulses Normal] : the pedal pulses are present [Normal Bowel Sounds] : normal bowel sounds [Not Tender] : non-tender [Not Distended] : not distended [Soft] : abdomen soft [Normal Anterior Cervical Nodes] : no anterior cervical lymphadenopathy [Normal Posterior Cervical Nodes] : no posterior cervical lymphadenopathy [No CVA Tenderness] : no ~M costovertebral angle tenderness [No Spinal Tenderness] : no spinal tenderness [Spine Straight] : spine straight [No Stigmata of Cushings Syndrome] : no stigmata of Cushings Syndrome [Normal Gait] : normal gait [Normal Strength/Tone] : muscle strength and tone were normal [No Rash] : no rash [Acanthosis Nigricans] : no acanthosis nigricans [Normal Reflexes] : deep tendon reflexes were 2+ and symmetric [No Tremors] : no tremors [Normal Mood] : the mood was normal [de-identified] : autistic, playing with her computer [de-identified] : DM2 uncontrolled  [de-identified] : autistic

## 2021-07-27 NOTE — DATA REVIEWED
[FreeTextEntry1] : 7/24/21 [facility] glucose 185,gfr 54, vit b12 791, chol 136, hdl 40, trig 98, ldl 76, hgba1c 6.7vit d 57 ft4 1.4, tsh 0.38, ft3 2.72, microalbumin urine <1.2

## 2021-09-01 ENCOUNTER — LABORATORY RESULT (OUTPATIENT)
Age: 31
End: 2021-09-01

## 2021-09-02 ENCOUNTER — APPOINTMENT (OUTPATIENT)
Dept: ENDOCRINOLOGY | Facility: CLINIC | Age: 31
End: 2021-09-02
Payer: COMMERCIAL

## 2021-09-02 VITALS
OXYGEN SATURATION: 97 % | TEMPERATURE: 97.7 F | WEIGHT: 178 LBS | SYSTOLIC BLOOD PRESSURE: 122 MMHG | HEIGHT: 63 IN | HEART RATE: 74 BPM | RESPIRATION RATE: 18 BRPM | DIASTOLIC BLOOD PRESSURE: 70 MMHG | BODY MASS INDEX: 31.54 KG/M2

## 2021-09-02 PROCEDURE — 99214 OFFICE O/P EST MOD 30 MIN: CPT

## 2021-09-02 RX ORDER — INSULIN GLARGINE 100 [IU]/ML
100 INJECTION, SOLUTION SUBCUTANEOUS AT BEDTIME
Qty: 3 | Refills: 5 | Status: COMPLETED | COMMUNITY
Start: 2021-07-22 | End: 2021-09-02

## 2021-09-02 NOTE — HISTORY OF PRESENT ILLNESS
[Finger Stick] : Finger Stick: Yes [FreeTextEntry1] : patient came with caregiver. Her FBS glucose 106- 128 in the morning. Care taker told her glucose would go below 100 at night. Will discontinue Lantus  and continue resot of medication. Hgb a1c now 5.6   [Continuous Glucose Monitoring] : Continuous Glucose Monitoring: No [FreeTextEntry9] : 106-128 [de-identified] :

## 2021-09-02 NOTE — ASSESSMENT
[Diabetes Foot Care] : diabetes foot care [Long Term Vascular Complications] : long term vascular complications of diabetes [Carbohydrate Consistent Diet] : carbohydrate consistent diet [Importance of Diet and Exercise] : importance of diet and exercise to improve glycemic control, achieve weight loss and improve cardiovascular health [Exercise/Effect on Glucose] : exercise/effect on glucose [Hypoglycemia Management] : hypoglycemia management [Glucagon Use] : glucagon use [Ketone Testing] : ketone testing [Action and use of Insulin] : action and use of short and long-acting insulin [Self Monitoring of Blood Glucose] : self monitoring of blood glucose [Insulin Self-Administration] : insulin self-administration [Injection Technique, Storage, Sharps Disposal] : injection technique, storage, and sharps disposal [Sick-Day Management] : sick-day management [Retinopathy Screening] : Patient was referred to ophthalmology for retinopathy screening [Weight Loss] : weight loss [Diabetic Medications] : Risks and benefits of diabetic medications were discussed [Levothyroxine] : The patient was instructed to take Levothyroxine on an empty stomach, separate from vitamins, and wait at least 30 minutes before eating [FreeTextEntry3] : WILL D/C LANTUS AND MONITOR GLUCOSE DAILY, IF GLUCOSE OVER 180, TAKE GLUCOSE 2 X A DAY.

## 2021-09-02 NOTE — DATA REVIEWED
[FreeTextEntry1] : 9/1/21 [facility] glucose 126, creatinine 1.2 GFR 60, chol 117, trig 86, ldl 60, hgba1c 5.6, vit b 12 539, vit d 58, tsh 0.22, ft3 2.46, ft4 1.4

## 2021-09-02 NOTE — PAST MEDICAL HISTORY
[Menstruating] : The patient is menstruating [Definite ___ (Date)] : the last menstrual period was [unfilled] [Normal Amount/Duration] : it was of a normal amount and duration [Regular Cycle Intervals] : have been regular

## 2021-09-02 NOTE — REASON FOR VISIT
[Follow - Up] : a follow-up visit [DM Type 2] : DM Type 2 [Hypothyroidism] : hypothyroidism [Formal Caregiver] : formal caregiver

## 2021-09-02 NOTE — PHYSICAL EXAM
[Alert] : alert [Well Nourished] : well nourished [Healthy Appearance] : healthy appearance [No Acute Distress] : no acute distress [Well Developed] : well developed [Normal Sclera/Conjunctiva] : normal sclera/conjunctiva [EOMI] : extra ocular movement intact [PERRL] : pupils equal, round and reactive to light [Normal Outer Ear/Nose] : the ears and nose were normal in appearance [No Proptosis] : no proptosis [Normal Hearing] : hearing was normal [Thyroid Not Enlarged] : the thyroid was not enlarged [No Thyroid Nodules] : no palpable thyroid nodules [No Respiratory Distress] : no respiratory distress [No Accessory Muscle Use] : no accessory muscle use [Normal Rate and Effort] : normal respiratory rate and effort [Clear to Auscultation] : lungs were clear to auscultation bilaterally [Normal S1, S2] : normal S1 and S2 [Normal Rate] : heart rate was normal [Regular Rhythm] : with a regular rhythm [Carotids Normal] : carotid pulses were normal with no bruits [No Edema] : no peripheral edema [Pedal Pulses Normal] : the pedal pulses are present [Normal Bowel Sounds] : normal bowel sounds [Not Tender] : non-tender [Not Distended] : not distended [Soft] : abdomen soft [Normal Anterior Cervical Nodes] : no anterior cervical lymphadenopathy [Normal Posterior Cervical Nodes] : no posterior cervical lymphadenopathy [No CVA Tenderness] : no ~M costovertebral angle tenderness [No Spinal Tenderness] : no spinal tenderness [Spine Straight] : spine straight [No Stigmata of Cushings Syndrome] : no stigmata of Cushings Syndrome [Normal Gait] : normal gait [Normal Strength/Tone] : muscle strength and tone were normal [No Rash] : no rash [Acanthosis Nigricans] : no acanthosis nigricans [Foot Ulcers] : no foot ulcers [Right Foot Was Examined] : right foot ~C was examined [Left Foot Was Examined] : left foot ~C was examined [Delayed in the Right Toes] : normal in the toes [Normal] : normal [Full ROM] : with full range of motion [Delayed in the Left Toes] : normal in the toes [Vibration Dec.] : normal vibratory sensation at the level of the toes [Position Sense Dec.] : normal position sense at the level of the toes [Diminished Throughout Both Feet] : normal tactile sensation with monofilament testing throughout both feet [#1 Diminished] : number 1 was normal [#2 Diminished] : number 2 was normal [#3 Diminished] : number 3 was normal [#4 Diminished] : number 4 was normal [#5 Diminished] : number 5 was normal [#6 Diminished] : number 6 was normal [#7 Diminished] : number 7 was normal [#8 Diminished] : number 8 was normal [#9 Diminished] : number 9 was normal [#10 Diminished] : number 10 was normal [Cranial Nerves Intact] : cranial nerves 2-12 were intact [Normal Reflexes] : deep tendon reflexes were 2+ and symmetric [No Tremors] : no tremors [Normal Mood] : the mood was normal [de-identified] : DM2 [de-identified] : AUTISM, NON VERBAL

## 2021-09-02 NOTE — THERAPY
[Today's Date] : [unfilled] [Lantus] : Lantus [BG Target = ____] : BG Target = [unfilled] [FreeTextEntry9] : 13 UNITS AT NIGHT [FreeTextEntry7] : JANUVIA 100MG , METFOMIN 100MG BID, PIOGLITASONE 30MG

## 2021-09-09 ENCOUNTER — APPOINTMENT (OUTPATIENT)
Dept: PEDIATRIC DEVELOPMENTAL SERVICES | Facility: CLINIC | Age: 31
End: 2021-09-09

## 2021-10-07 ENCOUNTER — APPOINTMENT (OUTPATIENT)
Dept: PEDIATRIC DEVELOPMENTAL SERVICES | Facility: CLINIC | Age: 31
End: 2021-10-07

## 2021-10-26 ENCOUNTER — APPOINTMENT (OUTPATIENT)
Dept: PEDIATRIC DEVELOPMENTAL SERVICES | Facility: CLINIC | Age: 31
End: 2021-10-26

## 2021-11-11 ENCOUNTER — APPOINTMENT (OUTPATIENT)
Dept: PEDIATRIC DEVELOPMENTAL SERVICES | Facility: CLINIC | Age: 31
End: 2021-11-11

## 2021-11-15 LAB
ALBUMIN SERPL ELPH-MCNC: 4.1 G/DL
ALP BLD-CCNC: 61 U/L
ALT SERPL-CCNC: 8 U/L
ANION GAP SERPL CALC-SCNC: 9 MMOL/L
AST SERPL-CCNC: 12 U/L
BASOPHILS # BLD AUTO: 0.1 K/UL
BASOPHILS NFR BLD AUTO: 1.3 %
BILIRUB SERPL-MCNC: 0.3 MG/DL
BUN SERPL-MCNC: 22 MG/DL
CALCIUM SERPL-MCNC: 9.3 MG/DL
CHLORIDE SERPL-SCNC: 106 MMOL/L
CHOLEST SERPL-MCNC: 104 MG/DL
CO2 SERPL-SCNC: 21 MMOL/L
CREAT SERPL-MCNC: 1.4 MG/DL
EOSINOPHIL # BLD AUTO: 0.17 K/UL
EOSINOPHIL NFR BLD AUTO: 2.2 %
ESTIMATED AVERAGE GLUCOSE: 114 MG/DL
GLUCOSE SERPL-MCNC: 128 MG/DL
HBA1C MFR BLD HPLC: 5.6 %
HCT VFR BLD CALC: 42.8 %
HDLC SERPL-MCNC: 44 MG/DL
HGB BLD-MCNC: 13.5 G/DL
IMM GRANULOCYTES NFR BLD AUTO: 0.5 %
LDLC SERPL CALC-MCNC: 50 MG/DL
LYMPHOCYTES # BLD AUTO: 1.97 K/UL
LYMPHOCYTES NFR BLD AUTO: 26.1 %
MAN DIFF?: NORMAL
MCHC RBC-ENTMCNC: 30.4 PG
MCHC RBC-ENTMCNC: 31.5 G/DL
MCV RBC AUTO: 96.4 FL
MONOCYTES # BLD AUTO: 0.98 K/UL
MONOCYTES NFR BLD AUTO: 13 %
NEUTROPHILS # BLD AUTO: 4.3 K/UL
NEUTROPHILS NFR BLD AUTO: 56.9 %
NONHDLC SERPL-MCNC: 60 MG/DL
PLATELET # BLD AUTO: 176 K/UL
POTASSIUM SERPL-SCNC: 4.1 MMOL/L
PROT SERPL-MCNC: 6.8 G/DL
RBC # BLD: 4.44 M/UL
RBC # FLD: 12.5 %
SODIUM SERPL-SCNC: 136 MMOL/L
T4 FREE SERPL-MCNC: 1.1 NG/DL
TRIGL SERPL-MCNC: 60 MG/DL
TSH SERPL-ACNC: 3.66 UIU/ML
WBC # FLD AUTO: 7.56 K/UL

## 2021-11-16 LAB
24R-OH-CALCIDIOL SERPL-MCNC: 35.6 PG/ML
T3FREE SERPL-MCNC: 2.5 PG/ML
VIT B12 SERPL-MCNC: 520 PG/ML

## 2021-11-16 NOTE — REASON FOR VISIT
Subjective:      History was provided by the mother. Dariela Rodriguez is a 15 m.o. female who is brought in by her mother for this well child visit. Birth History    Birth     Length: 19.49\" (49.5 cm)     Weight: 6 lb 13 oz (3.09 kg)     HC 34 cm (13.39\")    Apgar     One: 8     Five: 9    Delivery Method: Vaginal, Spontaneous    Gestation Age: 44 1/7 wks    Duration of Labor: 1st: 40m / 2nd: 5m     Immunization History   Administered Date(s) Administered    DTaP/Hep B/IPV (Pediarix) 2020, 2021, 2021    HIB PRP-T (ActHIB, Hiberix) 2020, 2021, 2021    Hepatitis B Ped/Adol (Engerix-B, Recombivax HB) 2020    Pneumococcal Conjugate 13-valent (Lonzie Jorge) 2020, 2021, 2021    Rotavirus Pentavalent (RotaTeq) 2020, 2021     Patient's medications, allergies, past medical, surgical, social and family histories were reviewed and updated as appropriate. Current Issues:  Current concerns on the part of Dariela's mother include no teeth yet. [de-identified] year old sister Elma has been diagnosed with lead poisoning, Tatianna Scherer has not been screened yet. Review of Nutrition:  Current diet: fruits and juices, cereals, meats, cow's milk  Difficulties with feeding? no    Social Screening:  Current child-care arrangements: in home: primary caregiver is mother  Sibling relations: sisters: 2 older - gets along better with 9 y/o sister than 3 y/o sister  Parental coping and self-care: doing well; no concerns  Secondhand smoke exposure? no       Objective:      Growth parameters are noted and are appropriate for age. General:   alert, appears stated age and cooperative   Skin:   normal   Head:   normal fontanelles, normal appearance, normal palate and supple neck   Eyes:   sclerae white, pupils equal and reactive, red reflex normal bilaterally   Ears:   normal bilaterally   Mouth:   No perioral or gingival cyanosis or lesions. Tongue is normal in appearance. and no teeth noted. Lungs:   clear to auscultation bilaterally   Heart:   regular rate and rhythm, S1, S2 normal, no murmur, click, rub or gallop   Abdomen:   soft, non-tender; bowel sounds normal; no masses,  no organomegaly   Screening DDH:   Ortolani's and Betancourt's signs absent bilaterally, leg length symmetrical and thigh & gluteal folds symmetrical   :   normal female   Femoral pulses:   present bilaterally   Extremities:   extremities normal, atraumatic, no cyanosis or edema   Neuro:   alert         Assessment:      Healthy exam.    Diagnosis Orders   1. Need for vaccination against Streptococcus pneumoniae using pneumococcal conjugate vaccine 13  PREVNAR 13 IM (Pneumococcal conjugate vaccine 13-valent)   2. Need for hepatitis A vaccination     3. Need for Hib vaccination     4. Need for MMRV (measles-mumps-rubella-varicella) vaccine/ProQuad vaccination  MMR and varicella combined vaccine subcutaneous   5. Lead exposure risk assessment, high risk  Lead, blood    Hemoglobin   6. Encounter for well child check without abnormal findings              Plan:      1. Anticipatory guidance: Gave CRS handout on well-child issues at this age. 2. Screening tests:  a. Hb or HCT (CDC recommends for children at risk between 9-12 months then again 6 months later; AAP recommends once age 7-15 months): yes    b. PPD: not applicable (Recommended annually if at risk: immunosuppression, clinical suspicion, poor/overcrowded living conditions, recent immigrant from TB-prevalent regions, contact with adults who are HIV+, homeless, IV drug users, NH residents, farm workers, or with active TB)    3. AP pelvis x-ray to screen for developmental dysplasia of the hip (consider per AAP if breech or if both family hx of DDH + female): not applicable    4. Immunizations today: MMRV and prevnar   History of previous adverse reactions to immunizations? no    5.  Follow-up visit in 3 months for next well child visit, or sooner as [Follow-Up: _____] : a [unfilled] follow-up visit [Formal Caregiver] : formal caregiver [FreeTextEntry1] : accompanied by Grisel Velarde

## 2021-12-06 ENCOUNTER — APPOINTMENT (OUTPATIENT)
Dept: PEDIATRIC DEVELOPMENTAL SERVICES | Facility: CLINIC | Age: 31
End: 2021-12-06
Payer: COMMERCIAL

## 2021-12-06 ENCOUNTER — NON-APPOINTMENT (OUTPATIENT)
Age: 31
End: 2021-12-06

## 2021-12-06 ENCOUNTER — OUTPATIENT (OUTPATIENT)
Dept: OUTPATIENT SERVICES | Facility: HOSPITAL | Age: 31
LOS: 1 days | Discharge: HOME | End: 2021-12-06

## 2021-12-06 VITALS — BODY MASS INDEX: 31.54 KG/M2 | TEMPERATURE: 97.5 F | WEIGHT: 178 LBS | HEIGHT: 63 IN | OXYGEN SATURATION: 98 %

## 2021-12-06 DIAGNOSIS — Z23 ENCOUNTER FOR IMMUNIZATION: ICD-10-CM

## 2021-12-06 DIAGNOSIS — K59.00 CONSTIPATION, UNSPECIFIED: ICD-10-CM

## 2021-12-06 DIAGNOSIS — Z00.00 ENCOUNTER FOR GENERAL ADULT MEDICAL EXAMINATION WITHOUT ABNORMAL FINDINGS: ICD-10-CM

## 2021-12-06 DIAGNOSIS — E03.9 HYPOTHYROIDISM, UNSPECIFIED: ICD-10-CM

## 2021-12-06 DIAGNOSIS — E78.00 PURE HYPERCHOLESTEROLEMIA, UNSPECIFIED: ICD-10-CM

## 2021-12-06 PROCEDURE — 99214 OFFICE O/P EST MOD 30 MIN: CPT | Mod: 25,GC

## 2021-12-06 RX ORDER — LANCETS
EACH MISCELLANEOUS
Qty: 100 | Refills: 5 | Status: DISCONTINUED | COMMUNITY
End: 2021-12-06

## 2021-12-06 NOTE — REVIEW OF SYSTEMS
[Fever] : no fever [Chills] : no chills [Fatigue] : no fatigue [Night Sweats] : no night sweats [Discharge] : no discharge [Pain] : no pain [Vision Problems] : no vision problems [Itching] : no itching [Earache] : no earache [Hearing Loss] : no hearing loss [Nasal Discharge] : no nasal discharge [Sore Throat] : no sore throat [Chest Pain] : no chest pain [Palpitations] : no palpitations [Leg Claudication] : no leg claudication [Lower Ext Edema] : no lower extremity edema [Paroxysmal Nocturnal Dyspnea] : no paroxysmal nocturnal dyspnea [Shortness Of Breath] : no shortness of breath [Wheezing] : no wheezing [Cough] : no cough [Abdominal Pain] : no abdominal pain [Nausea] : no nausea [Constipation] : no constipation [Vomiting] : no vomiting [Heartburn] : no heartburn [Incontinence] : no incontinence [Nocturia] : no nocturia [Hematuria] : no hematuria [Joint Pain] : no joint pain [Joint Stiffness] : no joint stiffness [Muscle Pain] : no muscle pain [Back Pain] : no back pain [Itching] : no itching [Hair Changes] : no hair changes [Skin Rash] : no skin rash [Headache] : no headache [Memory Loss] : no memory loss [Unsteady Walking] : no ataxia [Easy Bleeding] : no easy bleeding [Easy Bruising] : no easy bruising

## 2021-12-06 NOTE — HISTORY OF PRESENT ILLNESS
[FreeTextEntry1] : 32 Y/O Female came in for a follow up visit.  [de-identified] : 31 F PMH Type II DM, Autism, Hypothyroidism with a PMH of autism, type 2 DM, HLD, hypothyroidism, presenting in office for follow up.\par Patient accompanied by group staff member Damaris. States no changes at group home. Patient having regular bowel movements, behavior is at her baseline. The Aide does not report any complaints or issues over the past 3 months.

## 2021-12-06 NOTE — END OF VISIT
[] : Resident [FreeTextEntry3] : Pt. here for follow up.  Flu vaccine given today.  Medication renewed.  Had blood work done by endo.  RTC 3 months.  Gyn follow up.

## 2021-12-06 NOTE — ASSESSMENT
[FreeTextEntry1] : 31 F PMH of autism, type 2 DM, HLD, hypothyroidism, presenting in office for follow up\par \par # Autism\par # Mood disorder\par - on Divalproex, Lithium, Nuedexta, Olanzapine, Topiramate, Propranolol.\par - can f/u outpatient, no acute indication for hospitalization \par \par #DM\par - well controlled. c/w Januvia, Metformin, Pioglitazone. \par - endo f/u op\par \par # Hypothyroidism\par - C/W Levothyroxine  88 mcg\par - f/u endo \par \par # HLD\par - Blood work reviewed\par - c/w Pravastatin 40 mg\par \par # Vitamin D deficiency\par - c/w vitamin D supplementation. \par \par # Constipation\par - c/w Colace and Miralax\par \par # HCM\par - Recent blood work reviewed\par - covid vaccine: pt has received 2 shots of covid vaccine \par - Flu vaccine given at this visit \par - Recommend follow up with OB/GYN for pap smear.\par - RTC 3 months\par \par

## 2021-12-06 NOTE — PHYSICAL EXAM
[No Acute Distress] : no acute distress [Well Nourished] : well nourished [Well Developed] : well developed [Normal Sclera/Conjunctiva] : normal sclera/conjunctiva [PERRL] : pupils equal round and reactive to light [Normal Outer Ear/Nose] : the outer ears and nose were normal in appearance [No JVD] : no jugular venous distention [No Lymphadenopathy] : no lymphadenopathy [No Respiratory Distress] : no respiratory distress  [Normal Rate] : normal rate  [Regular Rhythm] : with a regular rhythm [Normal S1, S2] : normal S1 and S2 [No Carotid Bruits] : no carotid bruits [No Varicosities] : no varicosities [No Edema] : there was no peripheral edema [Soft] : abdomen soft [No HSM] : no HSM [No CVA Tenderness] : no CVA  tenderness [No Joint Swelling] : no joint swelling [No Rash] : no rash

## 2021-12-30 ENCOUNTER — APPOINTMENT (OUTPATIENT)
Dept: ENDOCRINOLOGY | Facility: CLINIC | Age: 31
End: 2021-12-30
Payer: COMMERCIAL

## 2021-12-30 DIAGNOSIS — E16.2 HYPOGLYCEMIA, UNSPECIFIED: ICD-10-CM

## 2021-12-30 PROCEDURE — 99214 OFFICE O/P EST MOD 30 MIN: CPT | Mod: 95

## 2021-12-30 RX ORDER — LANCETS 28 GAUGE
EACH MISCELLANEOUS
Qty: 200 | Refills: 3 | Status: ACTIVE | COMMUNITY
Start: 2021-02-16 | End: 1900-01-01

## 2022-01-04 ENCOUNTER — APPOINTMENT (OUTPATIENT)
Dept: OPHTHALMOLOGY | Facility: CLINIC | Age: 32
End: 2022-01-04

## 2022-01-04 ENCOUNTER — OUTPATIENT (OUTPATIENT)
Dept: OUTPATIENT SERVICES | Facility: HOSPITAL | Age: 32
LOS: 1 days | Discharge: HOME | End: 2022-01-04
Payer: COMMERCIAL

## 2022-01-04 PROCEDURE — 92014 COMPRE OPH EXAM EST PT 1/>: CPT

## 2022-01-07 PROBLEM — E16.2 HYPOGLYCEMIA: Status: ACTIVE | Noted: 2020-12-29

## 2022-01-07 NOTE — ASSESSMENT
[Diabetes Foot Care] : diabetes foot care [Long Term Vascular Complications] : long term vascular complications of diabetes [Carbohydrate Consistent Diet] : carbohydrate consistent diet [Importance of Diet and Exercise] : importance of diet and exercise to improve glycemic control, achieve weight loss and improve cardiovascular health [Exercise/Effect on Glucose] : exercise/effect on glucose [Hypoglycemia Management] : hypoglycemia management [Self Monitoring of Blood Glucose] : self monitoring of blood glucose [Sick-Day Management] : sick-day management [Retinopathy Screening] : Patient was referred to ophthalmology for retinopathy screening [Weight Loss] : weight loss [Diabetic Medications] : Risks and benefits of diabetic medications were discussed [Levothyroxine] : The patient was instructed to take Levothyroxine on an empty stomach, separate from vitamins, and wait at least 30 minutes before eating [FreeTextEntry4] : [address] diet, exercise, [FreeTextEntry3] : metformin , pioglitazone

## 2022-01-07 NOTE — HISTORY OF PRESENT ILLNESS
[Finger Stick] : Finger Stick: Yes [Home] : at home, [unfilled] , at the time of the visit. [Other Location: e.g. Home (Enter Location, City,State)___] : at [unfilled] [Care Coordinator] : care coordinator [Other:____] : [unfilled] [Verbal consent obtained from patient] : the patient, [unfilled] [FreeTextEntry4] : Tiffany Mendez [FreeTextEntry1] : FBS in -145, -190. She is doing well with medication. Denies any discomfort. Hgba1c 5.6, continue same medication and follow up 6 months [Continuous Glucose Monitoring] : Continuous Glucose Monitoring: No [Hypoglycemia] : Patient is not hypoglycemic. [FreeTextEntry9] : [id number] [FreeTextEntry8] : 1 [de-identified] : [phone]

## 2022-01-10 PROBLEM — Z00.00 ENCOUNTER FOR PREVENTIVE HEALTH EXAMINATION: Status: ACTIVE | Noted: 2022-01-10

## 2022-01-11 ENCOUNTER — APPOINTMENT (OUTPATIENT)
Dept: OPHTHALMOLOGY | Facility: CLINIC | Age: 32
End: 2022-01-11

## 2022-01-11 ENCOUNTER — OUTPATIENT (OUTPATIENT)
Dept: OUTPATIENT SERVICES | Facility: HOSPITAL | Age: 32
LOS: 1 days | Discharge: HOME | End: 2022-01-11
Payer: COMMERCIAL

## 2022-01-11 PROCEDURE — 99213 OFFICE O/P EST LOW 20 MIN: CPT

## 2022-01-14 DIAGNOSIS — S00.11XA CONTUSION OF RIGHT EYELID AND PERIOCULAR AREA, INITIAL ENCOUNTER: ICD-10-CM

## 2022-01-14 DIAGNOSIS — H52.10 MYOPIA, UNSPECIFIED EYE: ICD-10-CM

## 2022-01-14 DIAGNOSIS — E11.9 TYPE 2 DIABETES MELLITUS WITHOUT COMPLICATIONS: ICD-10-CM

## 2022-02-10 ENCOUNTER — APPOINTMENT (OUTPATIENT)
Dept: PEDIATRIC DEVELOPMENTAL SERVICES | Facility: CLINIC | Age: 32
End: 2022-02-10
Payer: COMMERCIAL

## 2022-02-10 ENCOUNTER — OUTPATIENT (OUTPATIENT)
Dept: OUTPATIENT SERVICES | Facility: HOSPITAL | Age: 32
LOS: 1 days | Discharge: HOME | End: 2022-02-10
Payer: COMMERCIAL

## 2022-02-10 VITALS
HEIGHT: 63 IN | TEMPERATURE: 98 F | HEART RATE: 80 BPM | SYSTOLIC BLOOD PRESSURE: 116 MMHG | DIASTOLIC BLOOD PRESSURE: 70 MMHG | OXYGEN SATURATION: 99 % | RESPIRATION RATE: 16 BRPM

## 2022-02-10 DIAGNOSIS — E78.00 PURE HYPERCHOLESTEROLEMIA, UNSPECIFIED: ICD-10-CM

## 2022-02-10 DIAGNOSIS — E55.9 VITAMIN D DEFICIENCY, UNSPECIFIED: ICD-10-CM

## 2022-02-10 DIAGNOSIS — F84.0 AUTISTIC DISORDER: ICD-10-CM

## 2022-02-10 DIAGNOSIS — Z00.00 ENCOUNTER FOR GENERAL ADULT MEDICAL EXAMINATION WITHOUT ABNORMAL FINDINGS: ICD-10-CM

## 2022-02-10 DIAGNOSIS — E11.9 TYPE 2 DIABETES MELLITUS WITHOUT COMPLICATIONS: ICD-10-CM

## 2022-02-10 DIAGNOSIS — E03.9 HYPOTHYROIDISM, UNSPECIFIED: ICD-10-CM

## 2022-02-10 PROCEDURE — 99214 OFFICE O/P EST MOD 30 MIN: CPT | Mod: GC

## 2022-02-10 PROCEDURE — 93010 ELECTROCARDIOGRAM REPORT: CPT

## 2022-02-10 NOTE — END OF VISIT
[] : Resident [FreeTextEntry3] : Pt. here for annual physical exam.  Had Tdap 6/28/17, Covid vaccine 12/31/20, 1/28/21 and flu vaccine 12/6/21.  Blood work and EKG ordered.  Gyn referral.  Medication renewed.  RTC 3 months.  Follow endo for DM and hypothyroidism

## 2022-02-10 NOTE — HISTORY OF PRESENT ILLNESS
[de-identified] : 31 F PMH Type II DM, Autism, Hypothyroidism with a PMH of autism, type 2 DM, HLD, hypothyroidism, presenting in office for annual comprehensive examination.\par Patient accompanied by group staff member Ms. Abdalla. States no changes at group home. Patient having regular bowel movements, behavior is at her baseline. The Aide does not report any complaints or issues over the past 3 months. [FreeTextEntry1] : 32 Y/O Female came in for a annual comprehensive examination.

## 2022-02-10 NOTE — HEALTH RISK ASSESSMENT
[No] : No [No falls in past year] : Patient reported no falls in the past year [Smoke Detector] : smoke detector [Carbon Monoxide Detector] : carbon monoxide detector [Safety elements used in home] : safety elements used in home [Seat Belt] :  uses seat belt [Guns at Home] : no guns at home [de-identified] : lives at group home [de-identified] : partially dependent, needs assistance [de-identified] : staff dependent

## 2022-02-10 NOTE — ASSESSMENT
[FreeTextEntry1] : 31 F PMH of autism, type 2 DM, HLD, hypothyroidism, presenting in office for follow up\par \par # Autism\par # Mood disorder\par - on Divalproex, Lithium, Nuedexta, Olanzapine, Topiramate, Propranolol.\par - can f/u outpatient, no acute indication for hospitalization \par \par #DM\par - well controlled. c/w Januvia, Metformin, Pioglitazone. \par - endo f/u op\par \par # Hypothyroidism\par - C/W Levothyroxine  88 mcg\par - f/u endo \par \par # HLD\par - Blood work reviewed\par - c/w Pravastatin 40 mg\par \par # Vitamin D deficiency\par - c/w vitamin D supplementation. \par \par # Constipation\par - c/w Colace and Miralax\par \par # HCM\par - Recent blood work reviewed\par - Covid vaccine: pt has received 2 shots of Covid vaccine + Covid booster in 1st week of Jan 2022\par - Flu vaccine given at last visit \par - Recommend follow up with OB/GYN for pap smear.\par - EKG performed in office. Routine labs ordered to be done before next visit.\par - RTC 3 months

## 2022-03-07 ENCOUNTER — APPOINTMENT (OUTPATIENT)
Dept: PEDIATRIC DEVELOPMENTAL SERVICES | Facility: CLINIC | Age: 32
End: 2022-03-07

## 2022-05-09 ENCOUNTER — OUTPATIENT (OUTPATIENT)
Dept: OUTPATIENT SERVICES | Facility: HOSPITAL | Age: 32
LOS: 1 days | Discharge: HOME | End: 2022-05-09

## 2022-05-09 ENCOUNTER — NON-APPOINTMENT (OUTPATIENT)
Age: 32
End: 2022-05-09

## 2022-05-09 ENCOUNTER — APPOINTMENT (OUTPATIENT)
Dept: PEDIATRIC DEVELOPMENTAL SERVICES | Facility: CLINIC | Age: 32
End: 2022-05-09
Payer: COMMERCIAL

## 2022-05-09 VITALS
WEIGHT: 179 LBS | SYSTOLIC BLOOD PRESSURE: 113 MMHG | HEART RATE: 74 BPM | OXYGEN SATURATION: 100 % | DIASTOLIC BLOOD PRESSURE: 81 MMHG | HEIGHT: 63 IN | BODY MASS INDEX: 31.71 KG/M2

## 2022-05-09 PROCEDURE — 99214 OFFICE O/P EST MOD 30 MIN: CPT | Mod: GC

## 2022-05-09 RX ORDER — PROPRANOLOL HYDROCHLORIDE 20 MG/1
20 TABLET ORAL
Qty: 30 | Refills: 2 | Status: DISCONTINUED | COMMUNITY
End: 2022-05-09

## 2022-05-09 NOTE — END OF VISIT
[] : Resident [FreeTextEntry3] : Pt. here for follow up.  Pt. did not complete blood work ordered 2/22, advised staff to complete blood work.  Medication renewed.  RTC 3 months.  Follow Gyn appointment scheduled 5/13/22\par

## 2022-05-09 NOTE — PHYSICAL EXAM
[No Acute Distress] : no acute distress [Well Nourished] : well nourished [Well Developed] : well developed [Normal Sclera/Conjunctiva] : normal sclera/conjunctiva [EOMI] : extraocular movements intact [No JVD] : no jugular venous distention [No Lymphadenopathy] : no lymphadenopathy [No Respiratory Distress] : no respiratory distress  [No Accessory Muscle Use] : no accessory muscle use [Clear to Auscultation] : lungs were clear to auscultation bilaterally [Normal Rate] : normal rate  [Regular Rhythm] : with a regular rhythm [Normal S1, S2] : normal S1 and S2 [No Murmur] : no murmur heard [Soft] : abdomen soft [Non Tender] : non-tender [Non-distended] : non-distended [Normal Bowel Sounds] : normal bowel sounds [No Joint Swelling] : no joint swelling [Grossly Normal Strength/Tone] : grossly normal strength/tone [No Rash] : no rash [No Skin Lesions] : no skin lesions [No Focal Deficits] : no focal deficits [Normal Gait] : normal gait

## 2022-05-09 NOTE — HISTORY OF PRESENT ILLNESS
[FreeTextEntry1] : 3 month follow up visit [de-identified] : Patient is a 32 year old female with autism here for follow up of her DM, HLD, vitamin D deficiency, obesity and routine health maintenance.\par Pt unable to express complaints due to her intellectual disability. Accompanied by stefany Rueda. She reports no issues.

## 2022-05-09 NOTE — ASSESSMENT
[FreeTextEntry1] : 32 F PMH of autism, T2DM, HLD, hypothyroidism, presenting in office for follow up\par \par # Autism\par # Mood disorder\par - on Divalproex, Lithium, Nuedexta, Olanzapine, Topiramate, Propranolol.\par - can f/u outpatient, no acute indication for hospitalization \par \par # Hypothyroidism\par - C/W Levothyroxine 88 mcg\par - f/u endo \par \par # Vitamin D deficiency\par - c/w vitamin D supplementation. \par \par # Constipation\par - c/w Colace and Miralax; high fiber diet\par \par #DM-2\par - well controlled\par - c/w Januvia, Metformin, Pioglitazone. \par - endo f/u op\par \par #Obesity\par -BMI 31\par -counselled on exercise and diet: low fat low carb low sugar high fiber \par \par # HLD\par - c/w Pravastatin 40 mg\par \par # HCM\par - Last labs 11/21 -> repeat labs ordered for february but not done, aid instructed to repeat blood work before next visit\par - Covid vaccine: pt has received 2 shots of Covid vaccine + Covid booster in 1st week of Jan 2022\par - Flu vaccine: 1/2021\par - Aide instructed to follow up with OB/GYN for pap smear and routine women's care\par - RTC 3 months.

## 2022-05-13 ENCOUNTER — APPOINTMENT (OUTPATIENT)
Dept: OBGYN | Facility: CLINIC | Age: 32
End: 2022-05-13
Payer: COMMERCIAL

## 2022-05-13 ENCOUNTER — OUTPATIENT (OUTPATIENT)
Dept: OUTPATIENT SERVICES | Facility: HOSPITAL | Age: 32
LOS: 1 days | Discharge: HOME | End: 2022-05-13

## 2022-05-13 VITALS
HEIGHT: 63 IN | SYSTOLIC BLOOD PRESSURE: 110 MMHG | DIASTOLIC BLOOD PRESSURE: 70 MMHG | BODY MASS INDEX: 31.18 KG/M2 | WEIGHT: 176 LBS

## 2022-05-13 PROCEDURE — 99385 PREV VISIT NEW AGE 18-39: CPT

## 2022-05-13 NOTE — DISCUSSION/SUMMARY
[FreeTextEntry1] : 31yo G0 for annual exam, no problems\par -unable to perform internal exam\par -follow up as needed

## 2022-05-13 NOTE — PHYSICAL EXAM

## 2022-05-13 NOTE — HISTORY OF PRESENT ILLNESS
[FreeTextEntry1] : 31yo G0 LMP 4/13/22 here accompanied by aides for annual exam. Pt non-verbal, but no issues or complaints. Pt has regular monthly menses.  Pt has PMH autism, GERD, hypothyroidism, DM, obesity, living in group home

## 2022-05-17 DIAGNOSIS — K59.00 CONSTIPATION, UNSPECIFIED: ICD-10-CM

## 2022-05-17 DIAGNOSIS — E55.9 VITAMIN D DEFICIENCY, UNSPECIFIED: ICD-10-CM

## 2022-05-17 DIAGNOSIS — E03.9 HYPOTHYROIDISM, UNSPECIFIED: ICD-10-CM

## 2022-05-17 DIAGNOSIS — Z00.00 ENCOUNTER FOR GENERAL ADULT MEDICAL EXAMINATION WITHOUT ABNORMAL FINDINGS: ICD-10-CM

## 2022-05-17 DIAGNOSIS — E11.9 TYPE 2 DIABETES MELLITUS WITHOUT COMPLICATIONS: ICD-10-CM

## 2022-05-17 DIAGNOSIS — E66.9 OBESITY, UNSPECIFIED: ICD-10-CM

## 2022-06-27 ENCOUNTER — APPOINTMENT (OUTPATIENT)
Dept: ENDOCRINOLOGY | Facility: CLINIC | Age: 32
End: 2022-06-27
Payer: COMMERCIAL

## 2022-06-27 VITALS
RESPIRATION RATE: 18 BRPM | TEMPERATURE: 97.4 F | SYSTOLIC BLOOD PRESSURE: 110 MMHG | HEIGHT: 63 IN | OXYGEN SATURATION: 97 % | WEIGHT: 172 LBS | BODY MASS INDEX: 30.48 KG/M2 | DIASTOLIC BLOOD PRESSURE: 70 MMHG | HEART RATE: 74 BPM

## 2022-06-27 LAB — HBA1C MFR BLD HPLC: 5.6

## 2022-06-27 PROCEDURE — 83036 HEMOGLOBIN GLYCOSYLATED A1C: CPT | Mod: QW

## 2022-06-27 PROCEDURE — 99214 OFFICE O/P EST MOD 30 MIN: CPT

## 2022-06-27 NOTE — PHYSICAL EXAM
[Alert] : alert [Well Nourished] : well nourished [Healthy Appearance] : healthy appearance [No Acute Distress] : no acute distress [Well Developed] : well developed [Normal Sclera/Conjunctiva] : normal sclera/conjunctiva [EOMI] : extra ocular movement intact [PERRL] : pupils equal, round and reactive to light [No Proptosis] : no proptosis [Normal Outer Ear/Nose] : the ears and nose were normal in appearance [Normal Hearing] : hearing was normal [Thyroid Not Enlarged] : the thyroid was not enlarged [No Thyroid Nodules] : no palpable thyroid nodules [No Respiratory Distress] : no respiratory distress [No Accessory Muscle Use] : no accessory muscle use [Normal Rate and Effort] : normal respiratory rate and effort [Clear to Auscultation] : lungs were clear to auscultation bilaterally [Normal S1, S2] : normal S1 and S2 [Normal Rate] : heart rate was normal [Regular Rhythm] : with a regular rhythm [Carotids Normal] : carotid pulses were normal with no bruits [No Edema] : no peripheral edema [Pedal Pulses Normal] : the pedal pulses are present [Normal Bowel Sounds] : normal bowel sounds [Not Tender] : non-tender [Not Distended] : not distended [Soft] : abdomen soft [Normal Anterior Cervical Nodes] : no anterior cervical lymphadenopathy [No CVA Tenderness] : no ~M costovertebral angle tenderness [No Spinal Tenderness] : no spinal tenderness [Spine Straight] : spine straight [No Stigmata of Cushings Syndrome] : no stigmata of Cushings Syndrome [Normal Gait] : normal gait [Normal Strength/Tone] : muscle strength and tone were normal [No Rash] : no rash [Normal] : normal [Full ROM] : with full range of motion [Cranial Nerves Intact] : cranial nerves 2-12 were intact [Normal Reflexes] : deep tendon reflexes were 2+ and symmetric [No Tremors] : no tremors [Normal Mood] : the mood was normal [Acanthosis Nigricans] : no acanthosis nigricans [Foot Ulcers] : no foot ulcers [Delayed in the Right Toes] : normal in the toes [Delayed in the Left Toes] : normal in the toes [Vibration Dec.] : normal vibratory sensation at the level of the toes [Position Sense Dec.] : normal position sense at the level of the toes [Diminished Throughout Both Feet] : normal tactile sensation with monofilament testing throughout both feet [#1 Diminished] : number 1 was normal [#2 Diminished] : number 2 was normal [#3 Diminished] : number 3 was normal [#4 Diminished] : number 4 was normal [#5 Diminished] : number 5 was normal [#6 Diminished] : number 6 was normal [#7 Diminished] : number 7 was normal [#8 Diminished] : number 8 was normal [#9 Diminished] : number 9 was normal [#10 Diminished] : number 10 was normal [de-identified] : DM2 [de-identified] : AUTISM, NON VERBAL

## 2022-06-27 NOTE — PAST MEDICAL HISTORY
[Menstruating] : The patient is menstruating [Definite ___ (Date)] : the last menstrual period was [unfilled] [Normal Amount/Duration] : it was of a normal amount and duration

## 2022-06-27 NOTE — REASON FOR VISIT
[Follow - Up] : a follow-up visit [DM Type 2] : DM Type 2 [Formal Caregiver] : formal caregiver [Other: _____] : [unfilled]

## 2022-06-27 NOTE — DATA REVIEWED
[FreeTextEntry1] : 4/15/22 [facility] glucose 176, creatinine 1.36, gfr 51, valporic acid 34.9, lithum 0.6

## 2022-06-27 NOTE — ASSESSMENT
[Diabetes Foot Care] : diabetes foot care [Long Term Vascular Complications] : long term vascular complications of diabetes [Carbohydrate Consistent Diet] : carbohydrate consistent diet [Importance of Diet and Exercise] : importance of diet and exercise to improve glycemic control, achieve weight loss and improve cardiovascular health [Exercise/Effect on Glucose] : exercise/effect on glucose [Hypoglycemia Management] : hypoglycemia management [Glucagon Use] : glucagon use [Ketone Testing] : ketone testing [Self Monitoring of Blood Glucose] : self monitoring of blood glucose [Sick-Day Management] : sick-day management [Retinopathy Screening] : Patient was referred to ophthalmology for retinopathy screening [Weight Loss] : weight loss [Diabetic Medications] : Risks and benefits of diabetic medications were discussed [Levothyroxine] : The patient was instructed to take Levothyroxine on an empty stomach, separate from vitamins, and wait at least 30 minutes before eating [FreeTextEntry4] : [address] diet and exercise

## 2022-06-27 NOTE — HISTORY OF PRESENT ILLNESS
[Continuous Glucose Monitoring] : Continuous Glucose Monitoring: Yes [Finger Stick] : Finger Stick: Yes [FreeTextEntry1] : Patient came for follow up Review Labs. Hgba1c now 5.6. Noted labs need to follow up kidney function next visit. Also no TSH was done recently. Will evaluate patient 3-4 months or prn. [Hypoglycemia] : Patient is not hypoglycemic. [FreeTextEntry9] : 110-130

## 2022-07-08 ENCOUNTER — LABORATORY RESULT (OUTPATIENT)
Age: 32
End: 2022-07-08

## 2022-07-11 LAB
24R-OH-CALCIDIOL SERPL-MCNC: 35.2 PG/ML
ALBUMIN SERPL ELPH-MCNC: 4.3 G/DL
ALP BLD-CCNC: 57 U/L
ALT SERPL-CCNC: 8 U/L
ANION GAP SERPL CALC-SCNC: 14 MMOL/L
AST SERPL-CCNC: 12 U/L
BASOPHILS # BLD AUTO: 0.06 K/UL
BASOPHILS NFR BLD AUTO: 1.1 %
BILIRUB SERPL-MCNC: 0.2 MG/DL
BUN SERPL-MCNC: 28 MG/DL
CALCIUM SERPL-MCNC: 9.5 MG/DL
CHLORIDE SERPL-SCNC: 106 MMOL/L
CHOLEST SERPL-MCNC: 128 MG/DL
CO2 SERPL-SCNC: 21 MMOL/L
CREAT SERPL-MCNC: 1.4 MG/DL
CREAT SPEC-SCNC: 17 MG/DL
EGFR: 51 ML/MIN/1.73M2
EOSINOPHIL # BLD AUTO: 0.09 K/UL
EOSINOPHIL NFR BLD AUTO: 1.6 %
ESTIMATED AVERAGE GLUCOSE: 111 MG/DL
FOLATE SERPL-MCNC: 12.8 NG/ML
GLUCOSE SERPL-MCNC: 121 MG/DL
HBA1C MFR BLD HPLC: 5.5 %
HCT VFR BLD CALC: 44.5 %
HDLC SERPL-MCNC: 44 MG/DL
HGB BLD-MCNC: 14 G/DL
IMM GRANULOCYTES NFR BLD AUTO: 0.4 %
LDLC SERPL CALC-MCNC: 63 MG/DL
LYMPHOCYTES # BLD AUTO: 1.84 K/UL
LYMPHOCYTES NFR BLD AUTO: 33.3 %
MAN DIFF?: NORMAL
MCHC RBC-ENTMCNC: 31 PG
MCHC RBC-ENTMCNC: 31.5 G/DL
MCV RBC AUTO: 98.5 FL
MICROALBUMIN 24H UR DL<=1MG/L-MCNC: <1.2 MG/DL
MICROALBUMIN/CREAT 24H UR-RTO: NORMAL MG/G
MONOCYTES # BLD AUTO: 0.59 K/UL
MONOCYTES NFR BLD AUTO: 10.7 %
NEUTROPHILS # BLD AUTO: 2.93 K/UL
NEUTROPHILS NFR BLD AUTO: 52.9 %
NONHDLC SERPL-MCNC: 84 MG/DL
PLATELET # BLD AUTO: 167 K/UL
POTASSIUM SERPL-SCNC: 4.6 MMOL/L
PROT SERPL-MCNC: 7.4 G/DL
RBC # BLD: 4.52 M/UL
RBC # FLD: 12.3 %
SODIUM SERPL-SCNC: 141 MMOL/L
T3 SERPL-MCNC: 89 NG/DL
T4 FREE SERPL-MCNC: 1.2 NG/DL
TRIGL SERPL-MCNC: 103 MG/DL
TSH SERPL-ACNC: 2.32 UIU/ML
VIT B12 SERPL-MCNC: 489 PG/ML
WBC # FLD AUTO: 5.53 K/UL

## 2022-07-13 ENCOUNTER — NON-APPOINTMENT (OUTPATIENT)
Age: 32
End: 2022-07-13

## 2022-07-13 ENCOUNTER — APPOINTMENT (OUTPATIENT)
Dept: PEDIATRIC DEVELOPMENTAL SERVICES | Facility: CLINIC | Age: 32
End: 2022-07-13

## 2022-07-13 ENCOUNTER — OUTPATIENT (OUTPATIENT)
Dept: OUTPATIENT SERVICES | Facility: HOSPITAL | Age: 32
LOS: 1 days | Discharge: HOME | End: 2022-07-13

## 2022-07-13 VITALS
WEIGHT: 174 LBS | HEIGHT: 63 IN | TEMPERATURE: 97.7 F | SYSTOLIC BLOOD PRESSURE: 118 MMHG | DIASTOLIC BLOOD PRESSURE: 90 MMHG | HEART RATE: 74 BPM | OXYGEN SATURATION: 98 % | BODY MASS INDEX: 30.83 KG/M2

## 2022-07-13 PROCEDURE — 99213 OFFICE O/P EST LOW 20 MIN: CPT | Mod: GC

## 2022-07-13 RX ORDER — HYDROCORTISONE 1 %
12 CREAM (GRAM) TOPICAL
Refills: 0 | Status: DISCONTINUED | COMMUNITY
End: 2022-07-13

## 2022-07-13 NOTE — ASSESSMENT
[FreeTextEntry1] : 32 F PMH of autism, T2DM, HLD, hypothyroidism, presenting in office for follow up\par \par # Autism\par # Mood disorder\par - on Divalproex, Lithium, Nuedexta, Olanzapine, Topiramate, Propranolol.\par - can f/u outpatient, no acute indication for hospitalization \par \par # Hypothyroidism\par - TSH and free T4,T3 within normal limits with current medication (July 8th 2022)\par - C/W Levothyroxine 88 mcg\par - f/u endo \par \par # Vitamin D deficiency\par - Vit D level normal (July 8th 2022)\par - c/w vitamin D supplementation. \par \par # Constipation\par - passing soft stools alternate day, normal frequency for the patient  \par - c/w Colace and Miralax; high fiber diet\par \par #DM-2\par - well controlled (HbA1c- 5.5; FBS- 121)\par - c/w Januvia, Metformin, Pioglitazone. \par - endo f/u op\par eye\par \par #Obesity\par -BMI 30.82\par -counselled on exercise and diet: low fat low carb low sugar high fiber \par \par # HLD\par - Lipid profile normal, lower lvl HDL (July 2022)\par - c/w Pravastatin 40 mg\par \par # HCM\par - routine blood workup next visit \par - Covid vaccine: pt has received 2 shots of Covid vaccine + Covid booster in 1st week of Jan 2022\par - Flu vaccine: 1/2021\par - RTC 3 months.

## 2022-07-13 NOTE — PHYSICAL EXAM
[Normal] : normal rate, regular rhythm, normal S1 and S2 and no murmur heard [No Carotid Bruits] : no carotid bruits [No Varicosities] : no varicosities [Pedal Pulses Present] : the pedal pulses are present [No Extremity Clubbing/Cyanosis] : no extremity clubbing/cyanosis [Soft] : abdomen soft [Non Tender] : non-tender [de-identified] : mild dependent pitting edema on bilateral lower limbs

## 2022-07-13 NOTE — HISTORY OF PRESENT ILLNESS
[Formal Caregiver] : formal caregiver [FreeTextEntry1] : Follow up visit \par caregiver Herb Marin [de-identified] : Patient is a 32 year old female with autism here for follow up of her DM, HLD, vitamin D deficiency, obesity and routine health maintenance.\par Pt unable to express complaints due to her intellectual disability. Accompanied by stefany Marin. She reports no issues. \par

## 2022-07-13 NOTE — REVIEW OF SYSTEMS
[de-identified] : p [FreeTextEntry1] : Patient could not communicate due to intellectual disabilities; Caregiver mentiobns there were no new complaints.

## 2022-07-13 NOTE — END OF VISIT
[] : Resident [FreeTextEntry3] : Pt. here for follow up.  Had blood work 7/8/22 showed TSH 2.32, LDL 63, triglyceride 103, A1C 5.5.  Medication renewed.  RTC 3 months.

## 2022-07-15 DIAGNOSIS — E03.9 HYPOTHYROIDISM, UNSPECIFIED: ICD-10-CM

## 2022-07-15 DIAGNOSIS — E55.9 VITAMIN D DEFICIENCY, UNSPECIFIED: ICD-10-CM

## 2022-07-15 DIAGNOSIS — E66.9 OBESITY, UNSPECIFIED: ICD-10-CM

## 2022-07-15 DIAGNOSIS — E78.00 PURE HYPERCHOLESTEROLEMIA, UNSPECIFIED: ICD-10-CM

## 2022-07-15 DIAGNOSIS — F84.0 AUTISTIC DISORDER: ICD-10-CM

## 2022-07-15 DIAGNOSIS — E11.9 TYPE 2 DIABETES MELLITUS WITHOUT COMPLICATIONS: ICD-10-CM

## 2022-07-15 DIAGNOSIS — K59.00 CONSTIPATION, UNSPECIFIED: ICD-10-CM

## 2022-07-15 DIAGNOSIS — F39 UNSPECIFIED MOOD [AFFECTIVE] DISORDER: ICD-10-CM

## 2022-07-28 ENCOUNTER — APPOINTMENT (OUTPATIENT)
Dept: NEUROLOGY | Facility: CLINIC | Age: 32
End: 2022-07-28

## 2022-08-08 ENCOUNTER — RX RENEWAL (OUTPATIENT)
Age: 32
End: 2022-08-08

## 2022-08-09 ENCOUNTER — APPOINTMENT (OUTPATIENT)
Dept: PEDIATRIC DEVELOPMENTAL SERVICES | Facility: CLINIC | Age: 32
End: 2022-08-09

## 2022-08-12 ENCOUNTER — RX RENEWAL (OUTPATIENT)
Age: 32
End: 2022-08-12

## 2022-09-08 ENCOUNTER — APPOINTMENT (OUTPATIENT)
Dept: NEUROLOGY | Facility: CLINIC | Age: 32
End: 2022-09-08

## 2022-09-27 ENCOUNTER — APPOINTMENT (OUTPATIENT)
Dept: ENDOCRINOLOGY | Facility: CLINIC | Age: 32
End: 2022-09-27

## 2022-09-27 VITALS
SYSTOLIC BLOOD PRESSURE: 110 MMHG | DIASTOLIC BLOOD PRESSURE: 72 MMHG | HEIGHT: 63 IN | BODY MASS INDEX: 29.77 KG/M2 | WEIGHT: 168 LBS

## 2022-09-27 PROCEDURE — 99214 OFFICE O/P EST MOD 30 MIN: CPT

## 2022-09-27 RX ORDER — LITHIUM CARBONATE 300 MG/1
300 TABLET ORAL
Refills: 0 | Status: COMPLETED | COMMUNITY
End: 2022-09-27

## 2022-09-27 NOTE — PHYSICAL EXAM
[Alert] : alert [Well Nourished] : well nourished [Healthy Appearance] : healthy appearance [No Acute Distress] : no acute distress [Well Developed] : well developed [Normal Sclera/Conjunctiva] : normal sclera/conjunctiva [EOMI] : extra ocular movement intact [PERRL] : pupils equal, round and reactive to light [No Proptosis] : no proptosis [Normal Outer Ear/Nose] : the ears and nose were normal in appearance [Normal Hearing] : hearing was normal [Supple] : the neck was supple [Thyroid Not Enlarged] : the thyroid was not enlarged [No Respiratory Distress] : no respiratory distress [No Accessory Muscle Use] : no accessory muscle use [Normal Rate and Effort] : normal respiratory rate and effort [Clear to Auscultation] : lungs were clear to auscultation bilaterally [Normal S1, S2] : normal S1 and S2 [Normal Rate] : heart rate was normal [Regular Rhythm] : with a regular rhythm [Carotids Normal] : carotid pulses were normal with no bruits [No Edema] : no peripheral edema [Pedal Pulses Normal] : the pedal pulses are present [Normal Bowel Sounds] : normal bowel sounds [Not Tender] : non-tender [Not Distended] : not distended [Soft] : abdomen soft [Normal Anterior Cervical Nodes] : no anterior cervical lymphadenopathy [No CVA Tenderness] : no ~M costovertebral angle tenderness [No Spinal Tenderness] : no spinal tenderness [Spine Straight] : spine straight [No Stigmata of Cushings Syndrome] : no stigmata of Cushings Syndrome [Normal Gait] : normal gait [Normal Strength/Tone] : muscle strength and tone were normal [No Rash] : no rash [Acanthosis Nigricans] : no acanthosis nigricans [Foot Ulcers] : no foot ulcers [Delayed in the Right Toes] : normal in the toes [Normal] : normal [Full ROM] : with full range of motion [Delayed in the Left Toes] : normal in the toes [Vibration Dec.] : normal vibratory sensation at the level of the toes [Position Sense Dec.] : normal position sense at the level of the toes [Diminished Throughout Both Feet] : normal tactile sensation with monofilament testing throughout both feet [#1 Diminished] : number 1 was normal [#2 Diminished] : number 2 was normal [#3 Diminished] : number 3 was normal [#4 Diminished] : number 4 was normal [#5 Diminished] : number 5 was normal [#6 Diminished] : number 6 was normal [#7 Diminished] : number 7 was normal [#8 Diminished] : number 8 was normal [#9 Diminished] : number 9 was normal [#10 Diminished] : number 10 was normal [Cranial Nerves Intact] : cranial nerves 2-12 were intact [Normal Reflexes] : deep tendon reflexes were 2+ and symmetric [No Tremors] : no tremors [Normal Mood] : the mood was normal [de-identified] : DM2 [de-identified] : AUTISM, NON VERBAL

## 2022-09-27 NOTE — REASON FOR VISIT
[Follow - Up] : a follow-up visit [DM Type 2] : DM Type 2 [Hypothyroidism] : hypothyroidism [Formal Caregiver] : formal caregiver [Other: _____] : [unfilled]

## 2022-09-27 NOTE — HISTORY OF PRESENT ILLNESS
[Finger Stick] : Finger Stick: Yes [FreeTextEntry1] : Patient came for follow up Noted In July Lab was 5.5, patient came with formal Caregiver and noted her Lithium was discontinued 7/2022. FBS -130. Will need to repeat labs [Hypoglycemia] : Patient is not hypoglycemic. [FreeTextEntry9] : 110-130

## 2022-09-27 NOTE — ASSESSMENT
[Diabetes Foot Care] : diabetes foot care [Long Term Vascular Complications] : long term vascular complications of diabetes [Carbohydrate Consistent Diet] : carbohydrate consistent diet [Importance of Diet and Exercise] : importance of diet and exercise to improve glycemic control, achieve weight loss and improve cardiovascular health [Exercise/Effect on Glucose] : exercise/effect on glucose [Hypoglycemia Management] : hypoglycemia management [Ketone Testing] : ketone testing [Self Monitoring of Blood Glucose] : self monitoring of blood glucose [Sick-Day Management] : sick-day management [Retinopathy Screening] : Patient was referred to ophthalmology for retinopathy screening [Diabetic Medications] : Risks and benefits of diabetic medications were discussed [FreeTextEntry4] : [address] [id number] ADA DIET AND EXERCISE

## 2022-09-27 NOTE — THERAPY
[Today's Date] : [unfilled] [BG Target = ____] : BG Target = [unfilled] [FreeTextEntry7] : metformin 1000 mg bid, pioglitazone 30mg daily, januvia 100mg, decrease metformin 1000 mg from bid to daily

## 2022-09-29 ENCOUNTER — APPOINTMENT (OUTPATIENT)
Dept: NEUROLOGY | Facility: CLINIC | Age: 32
End: 2022-09-29

## 2022-09-29 VITALS — WEIGHT: 179 LBS | BODY MASS INDEX: 31.71 KG/M2 | HEIGHT: 63 IN

## 2022-09-29 PROCEDURE — 99213 OFFICE O/P EST LOW 20 MIN: CPT

## 2022-09-29 NOTE — ASSESSMENT
[FreeTextEntry1] : 32 year old female with pseudobulbar affect as well as Autism. We will continue to prescribe Nuedexta and f/u in 6 months for re evaluation. If there is any issue the patient's aide is aware she should contact the office.\par \par I personally reviewed with the PA, this patient's history and physical exam findings, as documented above. I have discussed the relevant areas of concern, having direct implications to the presenting problems and illnesses, and I have personally examined all pertinent and positive and negative findings, which impact on the prior neurological treatment. \par \par \par Paula Frank, MS, PA-C\par Nikunj Santos MD\par

## 2022-09-29 NOTE — HISTORY OF PRESENT ILLNESS
[FreeTextEntry1] : She is a 32-year-old right-handed autistic woman initially presented with her mother as well as grandmother who provided most of the history. The patient was previously seen by Dr. Page many years ago, last seen in 2001 for compulsive-type symptoms. She was treated with Inderal and Prozac. The patient previously had a normal EEG and MRI revealing some mild cerebral atrophy. She was evaluated at [facility] many years ago. She has been followed at [facility]. Her condition has been fairly stable. She was prescribed Risperdal, Depakote, lorazepam at night, lithium and Inderal for many years. Mother states that she gets manic and aggressive, very hyper often. However, her last psychiatrist started her on Nuedexta two years ago and almost immediately they noticed improvement of her mood. She was much more calm. Her behavior improved tremendously and this was noted even in her day program. Unfortunately, her previous psychiatrist left and she is now seeing a new psychiatrist who does not feel comfortable prescribing Nuedexta for Marilia.\par \par TODAY:  I had the pleasure of seeing Marilia accompanied by her aides today in follow up. Her previous history and physical findings have been reviewed. \par \par She is under care for pseudobulbar affect and is also under care with a psychiatrist due to behavioral concerns as she suffers from bipolar disorder. Her aide speaks for her as she is largely non verbal. She continues to remain stable on her regimen of Nuedexta 20/10 1 capsule twice daily as her behavior is well controlled. Her aide states she has become a little more aggressive and they will discuss this further with the psychiatrist.  He states that she is doing well as her appetite and sleep habits are good. She experiences no adverse side effects from what they can assess and we will continue as is without change .

## 2022-09-29 NOTE — PHYSICAL EXAM
[General Appearance - In No Acute Distress] : in no acute distress [General Appearance - Well Nourished] : well nourished [General Appearance - Well Developed] : well developed [General Appearance - Well-Appearing] : healthy appearing [Cranial Nerves Optic (II)] : visual acuity intact bilaterally,  visual fields full to confrontation, pupils equal round and reactive to light [Cranial Nerves Oculomotor (III)] : extraocular motion intact [Cranial Nerves Facial (VII)] : face symmetrical [Motor Tone] : muscle tone was normal in all four extremities [Motor Strength] : muscle strength was normal in all four extremities [Involuntary Movements] : no involuntary movements were seen [FreeTextEntry1] : The general appearance of the patient shows abnormal development, poor nutrition, poor body habitus, deformities, poor attention to grooming and obesity. patient is largely non verbal and sits quietly watching her ipad through the visit Ability to Communicate is abnormal. she is largely non verbal but will on occasion follow simple commands

## 2022-10-13 ENCOUNTER — APPOINTMENT (OUTPATIENT)
Dept: PEDIATRIC DEVELOPMENTAL SERVICES | Facility: CLINIC | Age: 32
End: 2022-10-13

## 2022-10-13 ENCOUNTER — OUTPATIENT (OUTPATIENT)
Dept: OUTPATIENT SERVICES | Facility: HOSPITAL | Age: 32
LOS: 1 days | Discharge: HOME | End: 2022-10-13

## 2022-10-13 VITALS
HEART RATE: 75 BPM | BODY MASS INDEX: 30.12 KG/M2 | OXYGEN SATURATION: 99 % | TEMPERATURE: 97.2 F | HEIGHT: 63 IN | DIASTOLIC BLOOD PRESSURE: 75 MMHG | WEIGHT: 170 LBS | SYSTOLIC BLOOD PRESSURE: 118 MMHG

## 2022-10-13 DIAGNOSIS — K59.00 CONSTIPATION, UNSPECIFIED: ICD-10-CM

## 2022-10-13 DIAGNOSIS — E78.00 PURE HYPERCHOLESTEROLEMIA, UNSPECIFIED: ICD-10-CM

## 2022-10-13 DIAGNOSIS — E03.9 HYPOTHYROIDISM, UNSPECIFIED: ICD-10-CM

## 2022-10-13 DIAGNOSIS — Z86.39 PERSONAL HISTORY OF OTHER ENDOCRINE, NUTRITIONAL AND METABOLIC DISEASE: ICD-10-CM

## 2022-10-13 DIAGNOSIS — Z23 ENCOUNTER FOR IMMUNIZATION: ICD-10-CM

## 2022-10-13 DIAGNOSIS — E66.9 OBESITY, UNSPECIFIED: ICD-10-CM

## 2022-10-13 DIAGNOSIS — E11.9 TYPE 2 DIABETES MELLITUS WITHOUT COMPLICATIONS: ICD-10-CM

## 2022-10-13 DIAGNOSIS — Z00.00 ENCOUNTER FOR GENERAL ADULT MEDICAL EXAMINATION WITHOUT ABNORMAL FINDINGS: ICD-10-CM

## 2022-10-13 PROCEDURE — 99213 OFFICE O/P EST LOW 20 MIN: CPT | Mod: 25,GC

## 2022-10-13 RX ORDER — PROPRANOLOL HYDROCHLORIDE 10 MG/1
10 TABLET ORAL 3 TIMES DAILY
Refills: 0 | Status: DISCONTINUED | COMMUNITY
End: 2022-10-13

## 2022-10-13 RX ORDER — DEXTROMETHORPHAN HYDROBROMIDE AND QUINIDINE SULFATE 20; 10 MG/1; MG/1
20-10 CAPSULE, GELATIN COATED ORAL
Qty: 60 | Refills: 5 | Status: DISCONTINUED | COMMUNITY
Start: 2022-08-18 | End: 2022-10-13

## 2022-10-13 NOTE — HISTORY OF PRESENT ILLNESS
[Formal Caregiver] : formal caregiver [FreeTextEntry1] : Follow up visit \par caregiver - Dawson [de-identified] : Patient is a 32 year old female with autism here for follow up of her DM, HLD, vitamin D deficiency, obesity and routine health maintenance.\par Pt unable to express complaints due to her intellectual disability. Accompanied by stefany Osman. She reports no issues. \par

## 2022-10-13 NOTE — ASSESSMENT
Writer spoke with patient's wife and updated on plan of care. Patient's wife voiced concerns regarding how much oxygen he is requiring and the patient communicating to her that he might want to \"check himself out.\" Patient is having increased back pain with proning, making it more difficult for him to prone while in bed. Concerns communicated with MD to place new PRN pain medications. Wife would also like a phone call update from MD today as well - writer notified MD to contact wife.    1700 Discussed with MD continuing remdesevir, finished 5th scheduled dose yesterday. No further doses ordered at this time. MD to place orders if needed.   [FreeTextEntry1] : 32 F PMH of autism, T2DM, HLD, hypothyroidism, presenting in office for follow up\par \par # Autism\par # Mood disorder\par - on Divalproex, Lithium, Nuedexta, Olanzapine, Topiramate, Propranolol.\par - can f/u outpatient, no acute indication for hospitalization \par \par # Hypothyroidism\par - TSH and free T4,T3 within normal limits with current medication (July 8th 2022)\par - C/W Levothyroxine 88 mcg\par - f/u endo \par \par # Vitamin D deficiency\par - Vit D level normal (July 8th 2022)\par - c/w vitamin D supplementation. \par \par # Constipation\par - passing soft stools alternate day, normal frequency for the patient  \par - c/w Colace and Miralax; high fiber diet\par \par #DM-2\par - well controlled (HbA1c- 5.5; FBS- 121)\par - c/w Januvia, Metformin, Pioglitazone. \par - endo f/u op\par - ophthalmology f/up\par \par #Obesity\par -BMI 30.82\par -counselled on exercise and diet: low fat low carb low sugar high fiber \par \par # HLD\par - Lipid profile normal, lower lvl HDL (July 2022)\par - c/w Pravastatin 40 mg\par \par # HCM\par - routine blood workup next visit \par - Covid vaccine: pt has received 2 shots of Covid vaccine + Covid booster in 1st week of Jan 2022\par - Flu vaccine: given today\par - RTC 3 months.

## 2022-10-13 NOTE — PHYSICAL EXAM
[No Carotid Bruits] : no carotid bruits [No Varicosities] : no varicosities [Pedal Pulses Present] : the pedal pulses are present [No Extremity Clubbing/Cyanosis] : no extremity clubbing/cyanosis [Soft] : abdomen soft [Non Tender] : non-tender [No Acute Distress] : no acute distress [No JVD] : no jugular venous distention [Normal] : normal rate, regular rhythm, normal S1 and S2 and no murmur heard [de-identified] : mild dependent pitting edema on bilateral lower limbs

## 2022-10-13 NOTE — END OF VISIT
[] : Resident [FreeTextEntry3] : Pt. here for follow up.  Flu vaccine given today.  Medication renewed.  RTC 3 months

## 2022-12-23 ENCOUNTER — APPOINTMENT (OUTPATIENT)
Dept: OTOLARYNGOLOGY | Facility: CLINIC | Age: 32
End: 2022-12-23

## 2022-12-23 DIAGNOSIS — S01.21XA LACERATION W/OUT FOREIGN BODY OF NOSE, INITIAL ENCOUNTER: ICD-10-CM

## 2022-12-23 PROCEDURE — 99204 OFFICE O/P NEW MOD 45 MIN: CPT

## 2022-12-23 NOTE — PHYSICAL EXAM
[FreeTextEntry1] : nonverbal [de-identified] : healing external laceration, no deviation or crepitus to nasal bones [Midline] : trachea located in midline position [Normal] : no rashes [de-identified] : periorbital ecchymosis, full mobility

## 2022-12-23 NOTE — HISTORY OF PRESENT ILLNESS
[de-identified] : Patient presents today c/o  nasal trauma . She was  seen in [facility] 12/20/22 day of injury  , xray  performed . Xray  showed nondisplaced  fracture of nasal tip . Patient has bruising  around  eyes .  Patient is from a  home , she  is  accompanied by an aide. She is not able to communicate regarding discomfort .

## 2022-12-27 LAB
ALBUMIN SERPL ELPH-MCNC: 4 G/DL
ALP BLD-CCNC: 44 U/L
ALT SERPL-CCNC: 10 U/L
ANION GAP SERPL CALC-SCNC: 12 MMOL/L
AST SERPL-CCNC: 17 U/L
BASOPHILS # BLD AUTO: 0.07 K/UL
BASOPHILS NFR BLD AUTO: 1.3 %
BILIRUB SERPL-MCNC: 0.3 MG/DL
BUN SERPL-MCNC: 19 MG/DL
CALCIUM SERPL-MCNC: 9.3 MG/DL
CHLORIDE SERPL-SCNC: 108 MMOL/L
CHOLEST SERPL-MCNC: 138 MG/DL
CO2 SERPL-SCNC: 21 MMOL/L
CREAT SERPL-MCNC: 1.6 MG/DL
EGFR: 43 ML/MIN/1.73M2
EOSINOPHIL # BLD AUTO: 0.02 K/UL
EOSINOPHIL NFR BLD AUTO: 0.4 %
ESTIMATED AVERAGE GLUCOSE: 117 MG/DL
GLUCOSE SERPL-MCNC: 147 MG/DL
HBA1C MFR BLD HPLC: 5.7 %
HCT VFR BLD CALC: 41.7 %
HDLC SERPL-MCNC: 35 MG/DL
HGB BLD-MCNC: 13.9 G/DL
IMM GRANULOCYTES NFR BLD AUTO: 0.7 %
LDLC SERPL CALC-MCNC: 79 MG/DL
LYMPHOCYTES # BLD AUTO: 2.31 K/UL
LYMPHOCYTES NFR BLD AUTO: 43.1 %
MAN DIFF?: NORMAL
MCHC RBC-ENTMCNC: 32.3 PG
MCHC RBC-ENTMCNC: 33.3 G/DL
MCV RBC AUTO: 96.8 FL
MONOCYTES # BLD AUTO: 0.65 K/UL
MONOCYTES NFR BLD AUTO: 12.1 %
NEUTROPHILS # BLD AUTO: 2.27 K/UL
NEUTROPHILS NFR BLD AUTO: 42.4 %
NONHDLC SERPL-MCNC: 103 MG/DL
PLATELET # BLD AUTO: 168 K/UL
POTASSIUM SERPL-SCNC: 4.4 MMOL/L
PROT SERPL-MCNC: 6.8 G/DL
RBC # BLD: 4.31 M/UL
RBC # FLD: 12.6 %
SODIUM SERPL-SCNC: 141 MMOL/L
T3 SERPL-MCNC: 101 NG/DL
T4 FREE SERPL-MCNC: 1.3 NG/DL
TRIGL SERPL-MCNC: 120 MG/DL
TSH SERPL-ACNC: 0.54 UIU/ML
WBC # FLD AUTO: 5.36 K/UL

## 2022-12-29 LAB
24R-OH-CALCIDIOL SERPL-MCNC: 23.6 PG/ML
APPEARANCE: CLEAR
BILIRUBIN URINE: NEGATIVE
BLOOD URINE: ABNORMAL
COLOR: COLORLESS
CREAT SPEC-SCNC: 27 MG/DL
GLUCOSE QUALITATIVE U: NEGATIVE
KETONES URINE: NEGATIVE
LEUKOCYTE ESTERASE URINE: NEGATIVE
MICROALBUMIN 24H UR DL<=1MG/L-MCNC: <1.2 MG/DL
MICROALBUMIN/CREAT 24H UR-RTO: NORMAL MG/G
NITRITE URINE: NEGATIVE
PH URINE: 6
PROTEIN URINE: NEGATIVE
SPECIFIC GRAVITY URINE: 1.01
T3FREE SERPL-MCNC: 2.56 PG/ML
THYROGLOB AB SERPL-ACNC: <20 IU/ML
THYROPEROXIDASE AB SERPL IA-ACNC: 17.9 IU/ML
UROBILINOGEN URINE: NORMAL
VIT B12 SERPL-MCNC: 595 PG/ML

## 2023-01-05 ENCOUNTER — APPOINTMENT (OUTPATIENT)
Dept: ENDOCRINOLOGY | Facility: CLINIC | Age: 33
End: 2023-01-05
Payer: COMMERCIAL

## 2023-01-05 VITALS
TEMPERATURE: 97.5 F | BODY MASS INDEX: 29.06 KG/M2 | HEIGHT: 63 IN | SYSTOLIC BLOOD PRESSURE: 120 MMHG | RESPIRATION RATE: 18 BRPM | DIASTOLIC BLOOD PRESSURE: 72 MMHG | HEART RATE: 88 BPM | OXYGEN SATURATION: 97 % | WEIGHT: 164 LBS

## 2023-01-05 PROCEDURE — 99214 OFFICE O/P EST MOD 30 MIN: CPT

## 2023-01-05 RX ORDER — SITAGLIPTIN 100 MG/1
100 TABLET, FILM COATED ORAL
Qty: 30 | Refills: 5 | Status: COMPLETED | COMMUNITY
End: 2023-01-05

## 2023-01-05 RX ORDER — METFORMIN HYDROCHLORIDE 1000 MG/1
1000 TABLET, COATED ORAL DAILY
Qty: 30 | Refills: 6 | Status: COMPLETED | COMMUNITY
End: 2023-01-05

## 2023-01-05 NOTE — THERAPY
[Today's Date] : [unfilled] [BG Target = ____] : BG Target = [unfilled] [FreeTextEntry7] : pioglitazone 30 mg daily

## 2023-01-05 NOTE — REVIEW OF SYSTEMS
[Recent Weight Loss (___ Lbs)] : recent weight loss: [unfilled] lbs [Negative] : Heme/Lymph [de-identified] : DM2

## 2023-01-05 NOTE — REASON FOR VISIT
[Follow - Up] : a follow-up visit [DM Type 2] : DM Type 2 [Weight Management/Obesity] : weight management/obesity [Formal Caregiver] : formal caregiver [Other: _____] : [unfilled]

## 2023-01-05 NOTE — PHYSICAL EXAM
[Alert] : alert [Well Nourished] : well nourished [Healthy Appearance] : healthy appearance [No Acute Distress] : no acute distress [Well Developed] : well developed [Normal Sclera/Conjunctiva] : normal sclera/conjunctiva [EOMI] : extra ocular movement intact [PERRL] : pupils equal, round and reactive to light [No Proptosis] : no proptosis [Normal Outer Ear/Nose] : the ears and nose were normal in appearance [Normal Hearing] : hearing was normal [Supple] : the neck was supple [Thyroid Not Enlarged] : the thyroid was not enlarged [No Respiratory Distress] : no respiratory distress [No Accessory Muscle Use] : no accessory muscle use [Normal Rate and Effort] : normal respiratory rate and effort [Clear to Auscultation] : lungs were clear to auscultation bilaterally [Normal S1, S2] : normal S1 and S2 [Normal Rate] : heart rate was normal [Regular Rhythm] : with a regular rhythm [Carotids Normal] : carotid pulses were normal with no bruits [No Edema] : no peripheral edema [Pedal Pulses Normal] : the pedal pulses are present [Normal Bowel Sounds] : normal bowel sounds [Not Tender] : non-tender [Not Distended] : not distended [Soft] : abdomen soft [Normal Anterior Cervical Nodes] : no anterior cervical lymphadenopathy [No CVA Tenderness] : no ~M costovertebral angle tenderness [No Spinal Tenderness] : no spinal tenderness [Spine Straight] : spine straight [No Stigmata of Cushings Syndrome] : no stigmata of Cushings Syndrome [Normal Gait] : normal gait [Normal Strength/Tone] : muscle strength and tone were normal [No Rash] : no rash [Normal] : normal [Full ROM] : with full range of motion [Cranial Nerves Intact] : cranial nerves 2-12 were intact [No Motor Deficits] : the motor exam was normal [Normal Reflexes] : deep tendon reflexes were 2+ and symmetric [No Tremors] : no tremors [Normal Affect] : the affect was normal [Normal Mood] : the mood was normal [Acanthosis Nigricans] : no acanthosis nigricans [Foot Ulcers] : no foot ulcers [Delayed in the Right Toes] : normal in the toes [Delayed in the Left Toes] : normal in the toes [Vibration Dec.] : normal vibratory sensation at the level of the toes [Position Sense Dec.] : normal position sense at the level of the toes [Diminished Throughout Both Feet] : normal tactile sensation with monofilament testing throughout both feet [#1 Diminished] : number 1 was normal [#2 Diminished] : number 2 was normal [#3 Diminished] : number 3 was normal [#4 Diminished] : number 4 was normal [#5 Diminished] : number 5 was normal [#6 Diminished] : number 6 was normal [#7 Diminished] : number 7 was normal [#8 Diminished] : number 8 was normal [#9 Diminished] : number 9 was normal [#10 Diminished] : number 10 was normal [de-identified] : DM2 [de-identified] : AUTISM, NON VERBAL

## 2023-01-05 NOTE — ASSESSMENT
[Diabetes Foot Care] : diabetes foot care [Long Term Vascular Complications] : long term vascular complications of diabetes [Carbohydrate Consistent Diet] : carbohydrate consistent diet [Importance of Diet and Exercise] : importance of diet and exercise to improve glycemic control, achieve weight loss and improve cardiovascular health [Exercise/Effect on Glucose] : exercise/effect on glucose [Hypoglycemia Management] : hypoglycemia management [Glucagon Use] : glucagon use [Self Monitoring of Blood Glucose] : self monitoring of blood glucose [Sick-Day Management] : sick-day management [Retinopathy Screening] : Patient was referred to ophthalmology for retinopathy screening [Weight Loss] : weight loss [Diabetic Medications] : Risks and benefits of diabetic medications were discussed [Other____] : [unfilled] [FreeTextEntry4] : [address] diet [FreeTextEntry1] : diet and exercise

## 2023-01-05 NOTE — HISTORY OF PRESENT ILLNESS
[Finger Stick] : Finger Stick: Yes [FreeTextEntry1] : Came with  , review labs noted GFR 43, will d/c Metformin Januvia will be decrease to 50 mg daily . Will start Farxiga 10 mg daily

## 2023-01-09 ENCOUNTER — APPOINTMENT (OUTPATIENT)
Dept: PEDIATRIC DEVELOPMENTAL SERVICES | Facility: CLINIC | Age: 33
End: 2023-01-09
Payer: COMMERCIAL

## 2023-01-09 ENCOUNTER — OUTPATIENT (OUTPATIENT)
Dept: OUTPATIENT SERVICES | Facility: HOSPITAL | Age: 33
LOS: 1 days | Discharge: HOME | End: 2023-01-09

## 2023-01-09 ENCOUNTER — NON-APPOINTMENT (OUTPATIENT)
Age: 33
End: 2023-01-09

## 2023-01-09 VITALS
DIASTOLIC BLOOD PRESSURE: 72 MMHG | WEIGHT: 157 LBS | HEIGHT: 63 IN | HEART RATE: 77 BPM | BODY MASS INDEX: 27.82 KG/M2 | SYSTOLIC BLOOD PRESSURE: 101 MMHG | OXYGEN SATURATION: 97 %

## 2023-01-09 DIAGNOSIS — S02.2XXD FRACTURE OF NASAL BONES, SUBSEQUENT ENCOUNTER FOR FRACTURE WITH ROUTINE HEALING: ICD-10-CM

## 2023-01-09 PROCEDURE — 99214 OFFICE O/P EST MOD 30 MIN: CPT | Mod: GC

## 2023-01-09 NOTE — PHYSICAL EXAM
[No Acute Distress] : no acute distress [No JVD] : no jugular venous distention [No Carotid Bruits] : no carotid bruits [No Varicosities] : no varicosities [No Extremity Clubbing/Cyanosis] : no extremity clubbing/cyanosis [Soft] : abdomen soft [Non Tender] : non-tender [Normal Sclera/Conjunctiva] : normal sclera/conjunctiva [PERRL] : pupils equal round and reactive to light [No Respiratory Distress] : no respiratory distress  [No Accessory Muscle Use] : no accessory muscle use [Clear to Auscultation] : lungs were clear to auscultation bilaterally [Normal Rate] : normal rate  [Regular Rhythm] : with a regular rhythm [No Murmur] : no murmur heard [No CVA Tenderness] : no CVA  tenderness [No Joint Swelling] : no joint swelling [de-identified] : mild bruising under eyes  [de-identified] : mild dependent pitting edema on bilateral lower limbs

## 2023-01-09 NOTE — END OF VISIT
[] : Resident [FreeTextEntry3] : Pt. here for follow up.  Seen and evaluated by ENT for nasal fracture, no intervention.  Had flu vaccine 10/13/22.  BUN/Creat 19/1.6 with eGFR 43.  Pt. seen endo. and medication adjusted; off metformin and on farxiga and actos.  Medication renewed.  Refer to nephrology clinic for Stage 3B-CKD.  RTC 3 months.  Follow endo. for DM, and neuro as scheduled.

## 2023-01-10 ENCOUNTER — OUTPATIENT (OUTPATIENT)
Dept: OUTPATIENT SERVICES | Facility: HOSPITAL | Age: 33
LOS: 1 days | Discharge: HOME | End: 2023-01-10

## 2023-01-10 ENCOUNTER — APPOINTMENT (OUTPATIENT)
Dept: NEPHROLOGY | Facility: CLINIC | Age: 33
End: 2023-01-10
Payer: COMMERCIAL

## 2023-01-10 VITALS
WEIGHT: 158 LBS | SYSTOLIC BLOOD PRESSURE: 109 MMHG | TEMPERATURE: 96 F | HEIGHT: 63 IN | BODY MASS INDEX: 28 KG/M2 | HEART RATE: 114 BPM | DIASTOLIC BLOOD PRESSURE: 78 MMHG

## 2023-01-10 PROCEDURE — 99203 OFFICE O/P NEW LOW 30 MIN: CPT | Mod: GC

## 2023-01-10 NOTE — PHYSICAL EXAM
[General Appearance - Alert] : alert [] : no respiratory distress [Apical Impulse] : the apical impulse was normal [Heart Rate And Rhythm] : heart rate was normal and rhythm regular [Heart Sounds] : normal S1 and S2 [Bowel Sounds] : normal bowel sounds [Abdomen Soft] : soft

## 2023-01-10 NOTE — HISTORY OF PRESENT ILLNESS
[FreeTextEntry1] : 32 year old female with autism, T2DM, HLD, hypothyroidism presenting for initial evaluation. Pt was found to have Cr 1.6, eGFR 43 on most recent lab work. Pt is a poor historian, accompanied by aide although collateral history very limited as well. \par No benjamin hematuria no melena

## 2023-01-10 NOTE — END OF VISIT
[] : Resident [FreeTextEntry3] : Patient seen and examined with renal resident\par Agree with note A and plan\par # CKD 3b/ DM/ hematuria\par check RBUS\par repeat UA and spot protein creat \par check BMP and IP and RTC in 4-6 months

## 2023-01-10 NOTE — ASSESSMENT
[FreeTextEntry1] : 32 year old female with autism, T2DM, HLD, hypothyroidism presenting for initial evaluation\par \par #CKD 3B most likely due to DM \par # hematuria \par #DM2\par - Cr 1.6, eGFR 43 (12/27/22)\par - /78\par - Medications reviewed\par - RBUS, Spot Prot/Cr, Repeat UA\par - RTC 6 months

## 2023-01-10 NOTE — REVIEW OF SYSTEMS
[Fever] : no fever [Chills] : no chills [Eye Pain] : no eye pain [Red Eyes] : eyes not red [Earache] : no earache [Loss Of Hearing] : no hearing loss [Chest Pain] : no chest pain [Palpitations] : no palpitations [Shortness Of Breath] : no shortness of breath [Cough] : no cough [Abdominal Pain] : no abdominal pain [Vomiting] : no vomiting [Dysuria] : no dysuria [Incontinence] : no incontinence

## 2023-01-17 ENCOUNTER — APPOINTMENT (OUTPATIENT)
Dept: OPHTHALMOLOGY | Facility: CLINIC | Age: 33
End: 2023-01-17
Payer: COMMERCIAL

## 2023-01-17 ENCOUNTER — APPOINTMENT (OUTPATIENT)
Dept: PEDIATRIC DEVELOPMENTAL SERVICES | Facility: CLINIC | Age: 33
End: 2023-01-17

## 2023-01-17 ENCOUNTER — OUTPATIENT (OUTPATIENT)
Dept: OUTPATIENT SERVICES | Facility: HOSPITAL | Age: 33
LOS: 1 days | Discharge: HOME | End: 2023-01-17

## 2023-01-17 PROCEDURE — 92014 COMPRE OPH EXAM EST PT 1/>: CPT

## 2023-01-18 DIAGNOSIS — E66.9 OBESITY, UNSPECIFIED: ICD-10-CM

## 2023-01-18 DIAGNOSIS — E11.9 TYPE 2 DIABETES MELLITUS WITHOUT COMPLICATIONS: ICD-10-CM

## 2023-01-18 DIAGNOSIS — E03.9 HYPOTHYROIDISM, UNSPECIFIED: ICD-10-CM

## 2023-01-18 DIAGNOSIS — E78.00 PURE HYPERCHOLESTEROLEMIA, UNSPECIFIED: ICD-10-CM

## 2023-01-18 DIAGNOSIS — S02.2XXD FRACTURE OF NASAL BONES, SUBSEQUENT ENCOUNTER FOR FRACTURE WITH ROUTINE HEALING: ICD-10-CM

## 2023-01-18 DIAGNOSIS — Z86.39 PERSONAL HISTORY OF OTHER ENDOCRINE, NUTRITIONAL AND METABOLIC DISEASE: ICD-10-CM

## 2023-01-18 DIAGNOSIS — K59.00 CONSTIPATION, UNSPECIFIED: ICD-10-CM

## 2023-01-19 DIAGNOSIS — R31.9 HEMATURIA, UNSPECIFIED: ICD-10-CM

## 2023-01-19 DIAGNOSIS — E11.9 TYPE 2 DIABETES MELLITUS WITHOUT COMPLICATIONS: ICD-10-CM

## 2023-01-19 DIAGNOSIS — Z00.00 ENCOUNTER FOR GENERAL ADULT MEDICAL EXAMINATION WITHOUT ABNORMAL FINDINGS: ICD-10-CM

## 2023-01-19 DIAGNOSIS — N18.30 CHRONIC KIDNEY DISEASE, STAGE 3 UNSPECIFIED: ICD-10-CM

## 2023-01-23 ENCOUNTER — EMERGENCY (EMERGENCY)
Facility: HOSPITAL | Age: 33
LOS: 0 days | Discharge: HOME | End: 2023-01-23
Attending: EMERGENCY MEDICINE | Admitting: EMERGENCY MEDICINE
Payer: COMMERCIAL

## 2023-01-23 VITALS — HEART RATE: 83 BPM | RESPIRATION RATE: 20 BRPM | WEIGHT: 160.94 LBS | OXYGEN SATURATION: 92 %

## 2023-01-23 DIAGNOSIS — I10 ESSENTIAL (PRIMARY) HYPERTENSION: ICD-10-CM

## 2023-01-23 DIAGNOSIS — Z88.1 ALLERGY STATUS TO OTHER ANTIBIOTIC AGENTS STATUS: ICD-10-CM

## 2023-01-23 DIAGNOSIS — K59.00 CONSTIPATION, UNSPECIFIED: ICD-10-CM

## 2023-01-23 DIAGNOSIS — F84.0 AUTISTIC DISORDER: ICD-10-CM

## 2023-01-23 DIAGNOSIS — K59.09 OTHER CONSTIPATION: ICD-10-CM

## 2023-01-23 DIAGNOSIS — E11.9 TYPE 2 DIABETES MELLITUS WITHOUT COMPLICATIONS: ICD-10-CM

## 2023-01-23 DIAGNOSIS — Z88.0 ALLERGY STATUS TO PENICILLIN: ICD-10-CM

## 2023-01-23 DIAGNOSIS — Z79.84 LONG TERM (CURRENT) USE OF ORAL HYPOGLYCEMIC DRUGS: ICD-10-CM

## 2023-01-23 DIAGNOSIS — E03.9 HYPOTHYROIDISM, UNSPECIFIED: ICD-10-CM

## 2023-01-23 PROCEDURE — 99284 EMERGENCY DEPT VISIT MOD MDM: CPT

## 2023-01-23 NOTE — ED ADULT NURSE NOTE - CHIEF COMPLAINT QUOTE
Pt. brought to ED from [address] for c/o constipation x 1 day. Aid states pt. has been screaming and unable to have a bowel movement. Unable to obtain B/P and temp in triage. Pt. screaming and kicking.

## 2023-01-23 NOTE — ED PROVIDER NOTE - PATIENT PORTAL LINK FT
You can access the FollowMyHealth Patient Portal offered by [facility] by registering at the following website: [email or web address]. By joining [employer]’s FollowMyHealth portal, you will also be able to view your health information using other applications (apps) compatible with our system.

## 2023-01-23 NOTE — ED PROVIDER NOTE - CLINICAL SUMMARY MEDICAL DECISION MAKING FREE TEXT BOX
Constipation, bm in ED, abdomen soft, non-tender. No indication for admission or further emergent evaluation at this time. Was explained that the patient should still follow up with their primary physician or gastroenterology for further evaluation and management. These elements were discussed with the patient / caregiver and they are amenable to outpatient follow-up at this time.      The patient / caregiver given detailed return precautions and advised to return to the emergency department in 2-3 days if not improving or sooner if any new symptoms develop, symptoms worsened or for any concerns. The patient / caregiver offered the opportunity to ask questions and verbalized that they understand the diagnosis and discharge instructions.

## 2023-01-23 NOTE — ED PROVIDER NOTE - NS ED ROS FT
Constitutional:  No fevers or chills.  Eyes:  No visual changes, eye pain, or discharge.  ENT:  No hearing changes. No sore throat.  Neck:  No neck pain or stiffness.  Cardiac:  No CP or edema.  Resp:  No cough or SOB. No hemoptysis.   GI:  No nausea, vomiting, diarrhea, or abdominal pain.  (+) constipation  :  No dysuria, frequency, or hematuria.  MSK:  No myalgias or joint pain/swelling.  Neuro:  No headache, dizziness, or weakness.  Skin:  No skin rash.

## 2023-01-23 NOTE — ED ADULT TRIAGE NOTE - CHIEF COMPLAINT QUOTE
Pt. brought to ED from [address] for c/o constipation x 1 day. Aid states pt. has been screaming and unable to have a bowel movement. Unable to obtain B/P and temp in triage. Pt. screaming and kicking.

## 2023-01-23 NOTE — ED PROVIDER NOTE - CARE PROVIDER_API CALL
Debbi Rico)  Internal Medicine  [address], [address]  [place], [place] [id number]  Phone: [phone]  Fax: [phone]  Follow Up Time: Urgent

## 2023-01-23 NOTE — ED PROVIDER NOTE - ATTENDING CONTRIBUTION TO CARE
32 y/o female h/o HTN, DM, chronic constipation, autism (non-verbal) p/w intermittent abdominal discomfort today (pt grabbing at abdomen), last bowel movement was 3 days ago, passing flatus, no fever, v/d, cough, respiratory sx, apparent urinary sx, vaginal d/c or other associated complaints at present. Old chart reviewed. I have reviewed and agree with the initial nursing note, except as documented in my note.    VSS, awake, alert, non-toxic appearing, no scleral icterus, oropharynx clear, mmm, no jaundice, skin rash or lesions, chest CTAB, non-labored breathing, no w/r/r, +S1/S2, RRR, no m/r/g, abdomen soft, NT, +BS, no hernias or distention, no pulsatile masses or bruits appreciated, no CVA tenderness, no peripheral edema or deformities, alert, steady gait.

## 2023-01-23 NOTE — ED PROVIDER NOTE - NSFOLLOWUPINSTRUCTIONS_ED_ALL_ED_FT
Constipation    Constipation is when a person has fewer than three bowel movements a week, has difficulty having a bowel movement, or has stools that are dry, hard, or larger than normal. Other symptoms can include abdominal pain or bloating. As people grow older, constipation is more common. A low-fiber diet, not taking in enough fluids, and taking certain medicines may make constipation worse. Treatment varies but may include dietary modifications (more fiber-rich foods), lifestyle modifications, and possible medications.     SEEK IMMEDIATE MEDICAL CARE IF YOU HAVE THE FOLLOWING SYMPTOMS: bright red blood in your stool, constipation for longer than 4 days, abdominal or rectal pain, unexplained weight loss, or inability to pass gas. Nahid Cai)  Pediatrics  [address] [facility], [facility] [address]  [place] [address]  Phone: [phone]  Fax: [phone]  Follow Up Time:

## 2023-01-23 NOTE — ED PROVIDER NOTE - NSCAREINITIATED _GEN_ER
Fijian speaking - Pt stts she would like an order to check and make sure she does not have hepatitis. Pt would like to do this asap.  Please advise Nir Greene(Resident)

## 2023-02-01 ENCOUNTER — APPOINTMENT (OUTPATIENT)
Dept: PEDIATRIC DEVELOPMENTAL SERVICES | Facility: CLINIC | Age: 33
End: 2023-02-01
Payer: COMMERCIAL

## 2023-02-01 ENCOUNTER — OUTPATIENT (OUTPATIENT)
Dept: OUTPATIENT SERVICES | Facility: HOSPITAL | Age: 33
LOS: 1 days | Discharge: HOME | End: 2023-02-01

## 2023-02-01 VITALS — TEMPERATURE: 97.2 F | HEIGHT: 63 IN | BODY MASS INDEX: 27.86 KG/M2 | WEIGHT: 157.25 LBS

## 2023-02-01 DIAGNOSIS — Z00.00 ENCOUNTER FOR GENERAL ADULT MEDICAL EXAMINATION WITHOUT ABNORMAL FINDINGS: ICD-10-CM

## 2023-02-01 DIAGNOSIS — Z86.39 PERSONAL HISTORY OF OTHER ENDOCRINE, NUTRITIONAL AND METABOLIC DISEASE: ICD-10-CM

## 2023-02-01 DIAGNOSIS — N18.9 CHRONIC KIDNEY DISEASE, UNSPECIFIED: ICD-10-CM

## 2023-02-01 DIAGNOSIS — E11.9 TYPE 2 DIABETES MELLITUS WITHOUT COMPLICATIONS: ICD-10-CM

## 2023-02-01 DIAGNOSIS — E78.00 PURE HYPERCHOLESTEROLEMIA, UNSPECIFIED: ICD-10-CM

## 2023-02-01 DIAGNOSIS — R73.03 PREDIABETES: ICD-10-CM

## 2023-02-01 DIAGNOSIS — K59.00 CONSTIPATION, UNSPECIFIED: ICD-10-CM

## 2023-02-01 DIAGNOSIS — E03.9 HYPOTHYROIDISM, UNSPECIFIED: ICD-10-CM

## 2023-02-01 PROCEDURE — 99214 OFFICE O/P EST MOD 30 MIN: CPT | Mod: GC

## 2023-02-01 RX ORDER — SYRING-NEEDL,DISP,INSUL,0.3 ML 31 GX5/16"
31G X 5/16" SYRINGE, EMPTY DISPOSABLE MISCELLANEOUS
Qty: 50 | Refills: 6 | Status: DISCONTINUED | COMMUNITY
Start: 2021-07-22 | End: 2023-02-01

## 2023-02-01 NOTE — PHYSICAL EXAM
[Normal Sclera/Conjunctiva] : normal sclera/conjunctiva [Normal Outer Ear/Nose] : the outer ears and nose were normal in appearance [No JVD] : no jugular venous distention [No Lymphadenopathy] : no lymphadenopathy [No Respiratory Distress] : no respiratory distress  [No Accessory Muscle Use] : no accessory muscle use [Normal Rate] : normal rate  [Regular Rhythm] : with a regular rhythm [No Edema] : there was no peripheral edema [No Palpable Aorta] : no palpable aorta [Soft] : abdomen soft [Non Tender] : non-tender [No HSM] : no HSM [No Rash] : no rash [de-identified] : agitated [de-identified] : screaming [de-identified] : agitated so could not hear H sounds

## 2023-02-01 NOTE — HISTORY OF PRESENT ILLNESS
[FreeTextEntry1] : Came in for a follow up visit.  [de-identified] : Patient is a 33 year old female with autism, T2DM, HLD, hypothyroidism, CKD here for follow up. Pt unable to express complaints due to her intellectual disability. Accompanied by stefany Colby. She reports that patient had a recent ED visit on 1/23 for constipation with abdominal pain and she was discharged after she had a large BM in the ED. The nose has healed nicely without any issues. No other issues or complaints. \par \par The patient has constipation and the aide is not sure about the number of BM's per week but reports she didn have a BM yesterday or today.\par \par \par  \par  \par

## 2023-02-01 NOTE — REVIEW OF SYSTEMS
[Itching] : itching [Constipation] : constipation [Fever] : no fever [Redness] : no redness [Hoarseness] : no hoarseness [Lower Ext Edema] : no lower extremity edema [Wheezing] : no wheezing [Cough] : no cough [Hematuria] : no hematuria [Joint Swelling] : no joint swelling [Skin Rash] : no skin rash [Fainting] : no fainting [Unsteady Walking] : no ataxia

## 2023-02-01 NOTE — END OF VISIT
[] : Resident [FreeTextEntry3] : Pt. here for follow up.  Had flu vaccine 10/13/22.  Had blood work done 12/27/22.  Medication renewed.  Follow neuro for pseudobulbar affect, endo for diabetes and renal for CKD-3B as scheduled.  RTC 3 months\par

## 2023-02-01 NOTE — ASSESSMENT
[FreeTextEntry1] : 32 F PMH of autism, T2DM, HLD, hypothyroidism, presenting in office for follow up after nose injury. Patient was seen by ENT Dr. Block, was found to have nose laceration and closed fracture of nasal bone. There was no indication to repair. \par \par # Nose injury \par - recovered\par \par # Autism\par # Mood disorder\par - on Divalproex, Nuedexta, Olanzapine, Topiramate, Propranolol.\par - f/u with neuro as scheduled\par \par # Hypothyroidism\par - TSH 0.54 within normal limits with current medication (12/27/2022)\par - C/W Levothyroxine 88 mcg\par - f/u endo as scheduled\par \par # Vitamin D deficiency\par - Vit D level normal December 2022\par - c/w vitamin D supplementation. \par \par # Constipation\par - c/w Colace and increase Miralax to OD instead of twice weekly ; C/W high fiber diet\par - Counselled aide to make a Diary documenting bowel movements\par \par #DM-2\par - HbA1c- 5.7; FBS- 117)\par - c/w Januvia, Pioglitazone. \par - Farxiga 10 mg by endo \par - endo f/u op as scheduled\par - ophthalmology UTD\par - Nephro eval noted\par - Podiatry referral provided \par \par #CKD\par - Nephro evaluation noted\par - RBUS, Spot Prot/Cr, Repeat UA\par - F/U with nephro as scheduled\par - Repeat Serum Creatine and BMP\par \par # HLD\par - Lipid profile normal, lower lvl HDL (12/27/2022)\par - c/w Pravastatin 40 mg\par \par # HCM\par - Covid vaccine: pt has received 2 shots of Covid vaccine + Covid booster in 1st week of Jan 2022\par - Flu vaccine: given 10/13/2022\par - RTC 3 months\par - Gyn appointment scheduled\par - medication renewed\par

## 2023-03-13 ENCOUNTER — APPOINTMENT (OUTPATIENT)
Dept: NEUROLOGY | Facility: CLINIC | Age: 33
End: 2023-03-13

## 2023-04-10 ENCOUNTER — NON-APPOINTMENT (OUTPATIENT)
Age: 33
End: 2023-04-10

## 2023-04-10 ENCOUNTER — APPOINTMENT (OUTPATIENT)
Dept: PEDIATRIC DEVELOPMENTAL SERVICES | Facility: CLINIC | Age: 33
End: 2023-04-10
Payer: COMMERCIAL

## 2023-04-10 ENCOUNTER — APPOINTMENT (OUTPATIENT)
Dept: PEDIATRIC DEVELOPMENTAL SERVICES | Facility: CLINIC | Age: 33
End: 2023-04-10

## 2023-04-10 ENCOUNTER — OUTPATIENT (OUTPATIENT)
Dept: OUTPATIENT SERVICES | Facility: HOSPITAL | Age: 33
LOS: 1 days | End: 2023-04-10
Payer: COMMERCIAL

## 2023-04-10 VITALS
HEART RATE: 75 BPM | HEIGHT: 63 IN | TEMPERATURE: 96.7 F | BODY MASS INDEX: 28.17 KG/M2 | OXYGEN SATURATION: 99 % | WEIGHT: 159 LBS

## 2023-04-10 DIAGNOSIS — E78.00 PURE HYPERCHOLESTEROLEMIA, UNSPECIFIED: ICD-10-CM

## 2023-04-10 DIAGNOSIS — K21.00 GASTRO-ESOPHAGEAL REFLUX DISEASE WITH ESOPHAGITIS, WITHOUT BLEEDING: ICD-10-CM

## 2023-04-10 DIAGNOSIS — Z00.00 ENCOUNTER FOR GENERAL ADULT MEDICAL EXAMINATION WITHOUT ABNORMAL FINDINGS: ICD-10-CM

## 2023-04-10 DIAGNOSIS — E11.9 TYPE 2 DIABETES MELLITUS WITHOUT COMPLICATIONS: ICD-10-CM

## 2023-04-10 DIAGNOSIS — N18.9 CHRONIC KIDNEY DISEASE, UNSPECIFIED: ICD-10-CM

## 2023-04-10 DIAGNOSIS — E03.9 HYPOTHYROIDISM, UNSPECIFIED: ICD-10-CM

## 2023-04-10 DIAGNOSIS — Z86.39 PERSONAL HISTORY OF OTHER ENDOCRINE, NUTRITIONAL AND METABOLIC DISEASE: ICD-10-CM

## 2023-04-10 DIAGNOSIS — K59.00 CONSTIPATION, UNSPECIFIED: ICD-10-CM

## 2023-04-10 PROCEDURE — 99214 OFFICE O/P EST MOD 30 MIN: CPT

## 2023-04-10 PROCEDURE — 99214 OFFICE O/P EST MOD 30 MIN: CPT | Mod: GC

## 2023-04-10 NOTE — END OF VISIT
[] : Resident [FreeTextEntry3] : Pt. here for annual physical exam.  Had Tdap 6/28/17, Covid vaccine 12/31/20, 1/28/21, and Flu vaccine 10/13/22.  Blood work and EKG ordered.  Had eye exam 1/17/23.  Medication renewed.  Gyn follow up for annual exam.  RTC 3 months.  Follow renal for CKD 3B, neuro Dr. Santos for pseudobulbar affect as scheduled\par

## 2023-04-10 NOTE — ASSESSMENT
[FreeTextEntry1] : 33 F PMH of autism, T2DM, HLD, hypothyroidism, presenting in office for annual physical\par \par # Autism\par # Mood disorder\par - on Divalproex, Nuedexta, Olanzapine, Topiramate, Propranolol.\par - f/u with neuro as scheduled\par \par # Hypothyroidism\par - TSH 0.54 within normal limits with current medication (12/27/2022)\par - C/W Levothyroxine 88 mcg\par - f/u endo as scheduled\par \par # Vitamin D deficiency\par - Vit D level normal December 2022\par - c/w vitamin D supplementation. \par \par # Constipation\par - c/w Colace and increased Miralax to OD instead of twice weekly was done during last visit, reiterate to staff to increase\par - C/W high fiber diet\par - Counselled aide to make a Diary documenting bowel movements\par \par #DM-2\par - HbA1c- 5.7; FBS- 117\par - c/w Januvia, Pioglitazone. \par - Farxiga 10 mg started by Endo\par - Endo following, next appt 4/11/23\par - ophthalmology (1/2023)\par - Nephro eval noted\par - Podiatry followup needed\par \par #CKD\par - Nephro evaluation noted\par - RBUS, Spot Prot/Cr, Repeat UA still not done yet, reiterated to complete \par - F/U with nephro as scheduled on 7/11/23\par - Repeat Serum Creatine and BMP\par \par # HLD\par - Lipid profile normal, lower lvl HDL (12/27/2022)\par - c/w Pravastatin 40 mg\par \par # HCM\par - Covid vaccine: pt has received 2 shots of Covid vaccine + Covid booster in 1st week of Jan 2022\par - Flu vaccine: given 10/13/2022\par - Gyn appointment scheduled for 5/18/23\par - routine labs, EKG attempted today but pt not cooperative \par - medication renewed\par .

## 2023-04-10 NOTE — REVIEW OF SYSTEMS
[Fever] : no fever [Shortness Of Breath] : no shortness of breath [Cough] : no cough [Nausea] : no nausea [Constipation] : no constipation [Diarrhea] : diarrhea [Vomiting] : no vomiting [Incontinence] : no incontinence [FreeTextEntry2] : ROS limited due to unable to obtain hx from patient

## 2023-04-10 NOTE — HISTORY OF PRESENT ILLNESS
[de-identified] : 33 year old female with autism, T2DM, HLD, hypothyroidism, CKD here for annual physical \par Accompanied by staff Marybeth\par

## 2023-04-11 ENCOUNTER — LABORATORY RESULT (OUTPATIENT)
Age: 33
End: 2023-04-11

## 2023-04-11 ENCOUNTER — APPOINTMENT (OUTPATIENT)
Dept: ENDOCRINOLOGY | Facility: CLINIC | Age: 33
End: 2023-04-11

## 2023-04-12 ENCOUNTER — RESULT REVIEW (OUTPATIENT)
Age: 33
End: 2023-04-12

## 2023-04-12 ENCOUNTER — OUTPATIENT (OUTPATIENT)
Dept: OUTPATIENT SERVICES | Facility: HOSPITAL | Age: 33
LOS: 1 days | End: 2023-04-12
Payer: MEDICAID

## 2023-04-12 ENCOUNTER — NON-APPOINTMENT (OUTPATIENT)
Age: 33
End: 2023-04-12

## 2023-04-12 DIAGNOSIS — E11.9 TYPE 2 DIABETES MELLITUS WITHOUT COMPLICATIONS: ICD-10-CM

## 2023-04-12 DIAGNOSIS — Z00.00 ENCOUNTER FOR GENERAL ADULT MEDICAL EXAMINATION WITHOUT ABNORMAL FINDINGS: ICD-10-CM

## 2023-04-12 LAB
25(OH)D3 SERPL-MCNC: 80 NG/ML
ALBUMIN SERPL ELPH-MCNC: 4.3 G/DL
ALP BLD-CCNC: 57 U/L
ALT SERPL-CCNC: 10 U/L
ANION GAP SERPL CALC-SCNC: 12 MMOL/L
AST SERPL-CCNC: 16 U/L
BASOPHILS # BLD AUTO: 0.05 K/UL
BASOPHILS NFR BLD AUTO: 1 %
BILIRUB SERPL-MCNC: 0.3 MG/DL
BUN SERPL-MCNC: 27 MG/DL
CALCIUM SERPL-MCNC: 9.6 MG/DL
CHLORIDE SERPL-SCNC: 110 MMOL/L
CHOLEST SERPL-MCNC: 164 MG/DL
CO2 SERPL-SCNC: 23 MMOL/L
CREAT SERPL-MCNC: 1.9 MG/DL
EGFR: 35 ML/MIN/1.73M2
EOSINOPHIL # BLD AUTO: 0.05 K/UL
EOSINOPHIL NFR BLD AUTO: 1 %
ESTIMATED AVERAGE GLUCOSE: 123 MG/DL
GLUCOSE SERPL-MCNC: 146 MG/DL
HBA1C MFR BLD HPLC: 5.9 %
HCT VFR BLD CALC: 44.5 %
HDLC SERPL-MCNC: 46 MG/DL
HGB BLD-MCNC: 14.5 G/DL
IMM GRANULOCYTES NFR BLD AUTO: 0.4 %
LDLC SERPL CALC-MCNC: 100 MG/DL
LYMPHOCYTES # BLD AUTO: 1.71 K/UL
LYMPHOCYTES NFR BLD AUTO: 35.1 %
MAN DIFF?: NORMAL
MCHC RBC-ENTMCNC: 32.6 G/DL
MCHC RBC-ENTMCNC: 33 PG
MCV RBC AUTO: 101.4 FL
MONOCYTES # BLD AUTO: 0.62 K/UL
MONOCYTES NFR BLD AUTO: 12.7 %
NEUTROPHILS # BLD AUTO: 2.42 K/UL
NEUTROPHILS NFR BLD AUTO: 49.8 %
NONHDLC SERPL-MCNC: 118 MG/DL
PLATELET # BLD AUTO: 170 K/UL
POTASSIUM SERPL-SCNC: 4.4 MMOL/L
PROT SERPL-MCNC: 7.4 G/DL
RBC # BLD: 4.39 M/UL
RBC # FLD: 12.1 %
SODIUM SERPL-SCNC: 145 MMOL/L
TRIGL SERPL-MCNC: 91 MG/DL
TSH SERPL-ACNC: 0.14 UIU/ML
WBC # FLD AUTO: 4.87 K/UL

## 2023-04-12 PROCEDURE — 76775 US EXAM ABDO BACK WALL LIM: CPT

## 2023-04-12 PROCEDURE — 76775 US EXAM ABDO BACK WALL LIM: CPT | Mod: 26

## 2023-04-13 DIAGNOSIS — E11.9 TYPE 2 DIABETES MELLITUS WITHOUT COMPLICATIONS: ICD-10-CM

## 2023-04-20 ENCOUNTER — APPOINTMENT (OUTPATIENT)
Dept: NEUROLOGY | Facility: CLINIC | Age: 33
End: 2023-04-20
Payer: COMMERCIAL

## 2023-04-20 PROCEDURE — 99213 OFFICE O/P EST LOW 20 MIN: CPT

## 2023-04-20 NOTE — PHYSICAL EXAM
[FreeTextEntry1] : The general appearance of the patient shows abnormal development, poor nutrition, poor body habitus, deformities, poor attention to grooming and obesity. patient is largely non verbal and sits quietly watching her ipad through the visit Ability to Communicate is abnormal. she is largely non verbal but will on occasion follow simple commands  [General Appearance - In No Acute Distress] : in no acute distress [General Appearance - Well Nourished] : well nourished [General Appearance - Well Developed] : well developed [General Appearance - Well-Appearing] : healthy appearing [Cranial Nerves Optic (II)] : visual acuity intact bilaterally,  visual fields full to confrontation, pupils equal round and reactive to light [Cranial Nerves Oculomotor (III)] : extraocular motion intact [Cranial Nerves Facial (VII)] : face symmetrical [Motor Tone] : muscle tone was normal in all four extremities [Motor Strength] : muscle strength was normal in all four extremities [Involuntary Movements] : no involuntary movements were seen

## 2023-04-20 NOTE — ASSESSMENT
[FreeTextEntry1] : 33 year old female with pseudobulbar affect as well as Autism. We will continue to prescribe Nuedexta and f/u in 6 months for re evaluation. If there is any issue the patient's aide is aware she should contact the office.  They will let me know the outcome of the visit with the psychiatrist.\par \par I personally reviewed with the PA, this patient's history and physical exam findings, as documented above. I have discussed the relevant areas of concern, having direct implications to the presenting problems and illnesses, and I have personally examined all pertinent and positive and negative findings, which impact on the prior neurological treatment. \par \par \par Paula Frank, MS, PA-C\par Nikunj Santos MD\par

## 2023-04-20 NOTE — HISTORY OF PRESENT ILLNESS
[FreeTextEntry1] : She is a 33-year-old right-handed autistic woman initially presented with her mother as well as grandmother who provided most of the history. The patient was previously seen by Dr. Page many years ago, last seen in 2001 for compulsive-type symptoms. She was treated with Inderal and Prozac. The patient previously had a normal EEG and MRI revealing some mild cerebral atrophy. She was evaluated at [facility] many years ago. She has been followed at [facility]. Her condition has been fairly stable. She was prescribed Risperdal, Depakote, lorazepam at night, lithium and Inderal for many years. Mother states that she gets manic and aggressive, very hyper often. However, her last psychiatrist started her on Nuedexta two years ago and almost immediately they noticed improvement of her mood. She was much more calm. Her behavior improved tremendously and this was noted even in her day program. Unfortunately, her previous psychiatrist left and she is now seeing a new psychiatrist who does not feel comfortable prescribing Nuedexta for Marilia.\par \par TODAY:  I had the pleasure of seeing Marilia accompanied by her aides today in follow up. Her previous history and physical findings have been reviewed. \par \par She is under care for pseudobulbar affect and is also under care with a psychiatrist due to behavioral concerns as she suffers from bipolar disorder. Her aide speaks for her as she is largely non verbal. She continues to remain stable on her regimen of Nuedexta 20/10 1 capsule twice daily however, her aide states her aggression and agitation has increased.   I did speak with her nurse over the phone her states the behavior has slowly worsened and is particularly worse around her menstrual cycle.  She odes have ano appointment with her psychiatrist next week and they will discuss this as he will need to adjust her medications .  She is currently taking depakote, topamax, and olanzapine prescribed by her psychiatrist.

## 2023-05-09 ENCOUNTER — APPOINTMENT (OUTPATIENT)
Dept: ENDOCRINOLOGY | Facility: CLINIC | Age: 33
End: 2023-05-09
Payer: COMMERCIAL

## 2023-05-09 PROCEDURE — 99214 OFFICE O/P EST MOD 30 MIN: CPT

## 2023-05-09 NOTE — HISTORY OF PRESENT ILLNESS
[Finger Stick] : Finger Stick: Yes [Hypoglycemia] : Patient is not hypoglycemic. [FreeTextEntry9] : [phone] [FreeTextEntry1] : test 2 x a day

## 2023-05-09 NOTE — ASSESSMENT
[Diabetes Foot Care] : diabetes foot care [Long Term Vascular Complications] : long term vascular complications of diabetes [Carbohydrate Consistent Diet] : carbohydrate consistent diet [Importance of Diet and Exercise] : importance of diet and exercise to improve glycemic control, achieve weight loss and improve cardiovascular health [Exercise/Effect on Glucose] : exercise/effect on glucose [Hypoglycemia Management] : hypoglycemia management [Ketone Testing] : ketone testing [Injection Technique, Storage, Sharps Disposal] : injection technique, storage, and sharps disposal [Sick-Day Management] : sick-day management [Retinopathy Screening] : Patient was referred to ophthalmology for retinopathy screening [Diabetic Medications] : Risks and benefits of diabetic medications were discussed [Levothyroxine] : The patient was instructed to take Levothyroxine on an empty stomach, separate from vitamins, and wait at least 30 minutes before eating [FreeTextEntry4] : [address] diet,

## 2023-05-18 ENCOUNTER — APPOINTMENT (OUTPATIENT)
Dept: OBGYN | Facility: CLINIC | Age: 33
End: 2023-05-18

## 2023-05-18 ENCOUNTER — APPOINTMENT (OUTPATIENT)
Dept: OBGYN | Facility: CLINIC | Age: 33
End: 2023-05-18
Payer: COMMERCIAL

## 2023-05-18 ENCOUNTER — OUTPATIENT (OUTPATIENT)
Dept: OUTPATIENT SERVICES | Facility: HOSPITAL | Age: 33
LOS: 1 days | End: 2023-05-18
Payer: COMMERCIAL

## 2023-05-18 VITALS — DIASTOLIC BLOOD PRESSURE: 70 MMHG | WEIGHT: 159 LBS | SYSTOLIC BLOOD PRESSURE: 100 MMHG | BODY MASS INDEX: 28.17 KG/M2

## 2023-05-18 DIAGNOSIS — Z01.419 ENCOUNTER FOR GYNECOLOGICAL EXAMINATION (GENERAL) (ROUTINE) WITHOUT ABNORMAL FINDINGS: ICD-10-CM

## 2023-05-18 DIAGNOSIS — Z01.419 ENCOUNTER FOR GYNECOLOGICAL EXAMINATION (GENERAL) (ROUTINE) W/OUT ABNORMAL FINDINGS: ICD-10-CM

## 2023-05-18 PROCEDURE — 99395 PREV VISIT EST AGE 18-39: CPT

## 2023-05-18 NOTE — DISCUSSION/SUMMARY
[FreeTextEntry1] : 34yo, autism, uncooperative, for annual exam\par -unable to examine patient, however since patient has no issues or complaints, benefit of performing yearly exams is outweight by patient discomfort\par -recommend only bringing patient back if any issues arise

## 2023-05-18 NOTE — HISTORY OF PRESENT ILLNESS
[FreeTextEntry1] : 34yo G0, autism, accompanied by aides, sent for GYN exam\par Pt normally has regular menses, missed period in April, but had regular menses in May\par No reported problems or complaines per aides, pt is nonverbal

## 2023-05-19 DIAGNOSIS — Z01.419 ENCOUNTER FOR GYNECOLOGICAL EXAMINATION (GENERAL) (ROUTINE) WITHOUT ABNORMAL FINDINGS: ICD-10-CM

## 2023-05-19 DIAGNOSIS — F84.0 AUTISTIC DISORDER: ICD-10-CM

## 2023-06-06 ENCOUNTER — APPOINTMENT (OUTPATIENT)
Dept: NEPHROLOGY | Facility: CLINIC | Age: 33
End: 2023-06-06

## 2023-06-15 NOTE — H&P PST ADULT - PRO ARRIVE FROM
Specialty Pharmacy - Refill Coordination    Specialty Medication Orders Linked to Encounter      Flowsheet Row Most Recent Value   Medication #1 OTEZLA 30 mg Tab (Order#[id number], Rx#[id number])          Refill Questions - Documented Responses      Flowsheet Row Most Recent Value   Patient Availability and HIPAA Verification    Does patient want to proceed with activity? Unable to Reach          We have had multiple attempts to the patient and have been unsuccessful to reach the patient. We will stop reaching out to the patient but in the event that the patient needs the med and contacts us, we will communicate and begin dispensing for the patient. At your next visit with the patient, please review the importance of being in contact with our specialty pharmacy as a part of our care team.      Rubina VeeD  [facility]  [address]  [facility] [address]  Phone: [phone]  Fax: [phone]         aide: Remi Sun/group [facility]

## 2023-07-07 ENCOUNTER — RESULT REVIEW (OUTPATIENT)
Age: 33
End: 2023-07-07

## 2023-07-07 ENCOUNTER — OUTPATIENT (OUTPATIENT)
Dept: OUTPATIENT SERVICES | Facility: HOSPITAL | Age: 33
LOS: 1 days | End: 2023-07-07
Payer: COMMERCIAL

## 2023-07-07 DIAGNOSIS — Z00.8 ENCOUNTER FOR OTHER GENERAL EXAMINATION: ICD-10-CM

## 2023-07-07 DIAGNOSIS — E11.9 TYPE 2 DIABETES MELLITUS WITHOUT COMPLICATIONS: ICD-10-CM

## 2023-07-07 PROCEDURE — 76770 US EXAM ABDO BACK WALL COMP: CPT | Mod: 26

## 2023-07-07 PROCEDURE — 76770 US EXAM ABDO BACK WALL COMP: CPT

## 2023-07-08 DIAGNOSIS — E11.9 TYPE 2 DIABETES MELLITUS WITHOUT COMPLICATIONS: ICD-10-CM

## 2023-07-11 ENCOUNTER — OUTPATIENT (OUTPATIENT)
Dept: OUTPATIENT SERVICES | Facility: HOSPITAL | Age: 33
LOS: 1 days | End: 2023-07-11
Payer: COMMERCIAL

## 2023-07-11 ENCOUNTER — APPOINTMENT (OUTPATIENT)
Dept: NEPHROLOGY | Facility: CLINIC | Age: 33
End: 2023-07-11
Payer: COMMERCIAL

## 2023-07-11 ENCOUNTER — APPOINTMENT (OUTPATIENT)
Dept: NEPHROLOGY | Facility: CLINIC | Age: 33
End: 2023-07-11

## 2023-07-11 VITALS
HEIGHT: 63 IN | DIASTOLIC BLOOD PRESSURE: 72 MMHG | HEART RATE: 73 BPM | BODY MASS INDEX: 29.23 KG/M2 | OXYGEN SATURATION: 97 % | WEIGHT: 165 LBS | SYSTOLIC BLOOD PRESSURE: 104 MMHG

## 2023-07-11 DIAGNOSIS — Z00.00 ENCOUNTER FOR GENERAL ADULT MEDICAL EXAMINATION WITHOUT ABNORMAL FINDINGS: ICD-10-CM

## 2023-07-11 LAB
CREAT SPEC-SCNC: 8 MG/DL
CREAT/PROT UR: <0.6 RATIO
PROT UR-MCNC: <5 MG/DLG/24H

## 2023-07-11 PROCEDURE — 99213 OFFICE O/P EST LOW 20 MIN: CPT | Mod: GC

## 2023-07-11 PROCEDURE — 99213 OFFICE O/P EST LOW 20 MIN: CPT

## 2023-07-11 NOTE — HISTORY OF PRESENT ILLNESS
[FreeTextEntry1] : 33 year old female with autism, T2DM, HLD, hypothyroidism was seen first in jan 2023 for evaluation of ckd is here for follow-up. Accompanied by aid Mr. Dowling, from group [place], no complaints at this visit, discussed care with group home nurse.

## 2023-07-11 NOTE — ASSESSMENT
[FreeTextEntry1] : 33 year old female with autism, T2DM, HLD, hypothyroidism was seen first in jan 2023 for evaluation of ckd is here for follow-up.\par \par #CKD 3B most likely due to DM \par #DM2\par - Cr 1.6, eGFR 43 (12/27/22)--->1.9 GFR 35 April 2023\par - BP well controlled at this visit\par - Medications reviewed\par - RBUS 7/23 shows non obstructive 4mm right renal stone and left lower renal pole cyst ~1.4 cm(no complex features described on US)\par - encouraged adequate hydration ~60 ounces\par - educated on low salt diet, low glycemic index diet, and medication compliance\par - No renal contraindication to increase dose of zyprexa\par - RTC 6 months with repeat CMP

## 2023-07-11 NOTE — END OF VISIT
[] : Resident [FreeTextEntry3] : Patient seen and  examined with renal resident \par Agree with note A and plan \par # CKD 3b due to DM / kidney stones\par encouraged to increase free water intake> 2l per day\par no renal contraindications to increase zyprexa dose \par repeat CMP and RTC in 6 months

## 2023-07-19 DIAGNOSIS — N20.0 CALCULUS OF KIDNEY: ICD-10-CM

## 2023-07-19 DIAGNOSIS — N18.32 CHRONIC KIDNEY DISEASE, STAGE 3B: ICD-10-CM

## 2023-07-26 RX ORDER — DEXTROMETHORPHAN HYDROBROMIDE AND QUINIDINE SULFATE 20; 10 MG/1; MG/1
20-10 CAPSULE, GELATIN COATED ORAL
Qty: 60 | Refills: 0 | Status: DISCONTINUED | COMMUNITY
Start: 2022-09-27 | End: 2023-07-26

## 2023-07-27 ENCOUNTER — APPOINTMENT (OUTPATIENT)
Dept: PEDIATRIC DEVELOPMENTAL SERVICES | Facility: CLINIC | Age: 33
End: 2023-07-27
Payer: COMMERCIAL

## 2023-07-27 ENCOUNTER — OUTPATIENT (OUTPATIENT)
Dept: OUTPATIENT SERVICES | Facility: HOSPITAL | Age: 33
LOS: 1 days | End: 2023-07-27
Payer: COMMERCIAL

## 2023-07-27 VITALS
HEIGHT: 63 IN | OXYGEN SATURATION: 97 % | BODY MASS INDEX: 29.32 KG/M2 | WEIGHT: 165.5 LBS | HEART RATE: 87 BPM | TEMPERATURE: 97 F

## 2023-07-27 DIAGNOSIS — Z00.00 ENCOUNTER FOR GENERAL ADULT MEDICAL EXAMINATION WITHOUT ABNORMAL FINDINGS: ICD-10-CM

## 2023-07-27 LAB
ALBUMIN SERPL ELPH-MCNC: 3.8 G/DL
ALP BLD-CCNC: 56 U/L
ALT SERPL-CCNC: 6 U/L
ANION GAP SERPL CALC-SCNC: 12 MMOL/L
AST SERPL-CCNC: 12 U/L
BILIRUB SERPL-MCNC: 0.3 MG/DL
BUN SERPL-MCNC: 28 MG/DL
CALCIUM SERPL-MCNC: 8.7 MG/DL
CHLORIDE SERPL-SCNC: 103 MMOL/L
CHOLEST SERPL-MCNC: 131 MG/DL
CO2 SERPL-SCNC: 23 MMOL/L
CREAT SERPL-MCNC: 1.6 MG/DL
EGFR: 43 ML/MIN/1.73M2
ESTIMATED AVERAGE GLUCOSE: 123 MG/DL
GLUCOSE SERPL-MCNC: 191 MG/DL
HBA1C MFR BLD HPLC: 5.9 %
HDLC SERPL-MCNC: 44 MG/DL
LDLC SERPL CALC-MCNC: 74 MG/DL
NONHDLC SERPL-MCNC: 87 MG/DL
POTASSIUM SERPL-SCNC: 4.2 MMOL/L
PROT SERPL-MCNC: 6.9 G/DL
SODIUM SERPL-SCNC: 138 MMOL/L
TRIGL SERPL-MCNC: 66 MG/DL
TSH SERPL-ACNC: 0.32 UIU/ML

## 2023-07-27 PROCEDURE — 99214 OFFICE O/P EST MOD 30 MIN: CPT

## 2023-07-27 PROCEDURE — 80061 LIPID PANEL: CPT

## 2023-07-27 PROCEDURE — 84443 ASSAY THYROID STIM HORMONE: CPT

## 2023-07-27 PROCEDURE — 83036 HEMOGLOBIN GLYCOSYLATED A1C: CPT

## 2023-07-27 PROCEDURE — 99214 OFFICE O/P EST MOD 30 MIN: CPT | Mod: GC

## 2023-07-27 PROCEDURE — 85027 COMPLETE CBC AUTOMATED: CPT

## 2023-07-27 PROCEDURE — 80053 COMPREHEN METABOLIC PANEL: CPT

## 2023-07-27 NOTE — HISTORY OF PRESENT ILLNESS
[FreeTextEntry1] : Follow-up [de-identified] : 33 year old female with autism, T2DM, HLD, hypothyroidism, CKD here for follow-up. Accompanied by staff Hank and Lavelle. \par Seen by Neuro 4/2023 they will continue to prescribe Nuedexta, however aide reports patient is more uncooperative as of lately.\par Seen by Endo 5/2023. TSH 0.14. Patient was taking Levothyroxine 88 mcg and decreased to 75 mcg daily \par Seen by GYN 5/2023 uncooperative for annual exam, recommended that benefit of performing yearly exams is outweighed by patient discomfort, only bring patient back if any issues arise. \par Seen by Nephro 7/2023 Cr 1.6, eGFR 43 (12/27/22)--->1.9 GFR 35 April 2023. RBUS 7/23 shows non obstructive 4mm right renal stone and left lower renal pole cyst ~1.4 cm(no complex features described on US). Encouraged adequate hydration ~60 ounces. Educated on low salt diet, low glycemic index diet, and medication compliance. No renal contraindication to increase dose of zyprexa. RTC 6 months with repeat CMP.

## 2023-07-27 NOTE — HISTORY OF PRESENT ILLNESS
[FreeTextEntry1] : Follow-up [de-identified] : 33 year old female with autism, T2DM, HLD, hypothyroidism, CKD here for follow-up. Accompanied by staff Hank and Lavelle. \par Seen by Neuro 4/2023 they will continue to prescribe Nuedexta, however aide reports patient is more uncooperative as of lately.\par Seen by Endo 5/2023. TSH 0.14. Patient was taking Levothyroxine 88 mcg and decreased to 75 mcg daily \par Seen by GYN 5/2023 uncooperative for annual exam, recommended that benefit of performing yearly exams is outweighed by patient discomfort, only bring patient back if any issues arise. \par Seen by Nephro 7/2023 Cr 1.6, eGFR 43 (12/27/22)--->1.9 GFR 35 April 2023. RBUS 7/23 shows non obstructive 4mm right renal stone and left lower renal pole cyst ~1.4 cm(no complex features described on US). Encouraged adequate hydration ~60 ounces. Educated on low salt diet, low glycemic index diet, and medication compliance. No renal contraindication to increase dose of zyprexa. RTC 6 months with repeat CMP.

## 2023-07-27 NOTE — PHYSICAL EXAM
[Well Nourished] : well nourished [No Respiratory Distress] : no respiratory distress  [No Accessory Muscle Use] : no accessory muscle use [Clear to Auscultation] : lungs were clear to auscultation bilaterally [Normal Rate] : normal rate  [Regular Rhythm] : with a regular rhythm [Normal S1, S2] : normal S1 and S2 [No Murmur] : no murmur heard [de-identified] : Patient screaming

## 2023-07-27 NOTE — ASSESSMENT
[FreeTextEntry1] : 33 F PMH of autism, T2DM, HLD, hypothyroidism, presenting in office for follow-up\par \par # Autism\par # Mood disorder\par - on Divalproex, Nuedexta, Olanzapine, Topiramate, Propranolol.\par - seen by Neuro 4/2023 they will continue to prescribe Nuedexta and f/u neuro 6 months\par - patient might benefit from earlier f/u \par \par # Hypothyroidism\par - TSH 0.14 4/2023\par - Free T4 1.6 nl in 4/2023\par - seem by endo 5/2023, decreased levothyroxine from 88 to 75mg\par - c/w Levothyroxine 75 mcg\par \par # Vitamin D deficiency\par - Vit D level normal July 2023\par - discontinued vitamin D supplementation. \par \par # Constipation\par - c/w Colace and increased Miralax to OD instead of twice weekly was done during last visit, reiterate to staff to increase\par - C/W high fiber diet\par - Counselled aide to make a Diary documenting bowel movements\par \par #DM-2\par - HbA1c 4/2023 5.9\par - c/w Januvia, Pioglitazone. \par - Farxiga 10 mg started by Endo\par - seen by ophthalmology for retinopathy eval (1/2023), recommended annual evaluation\par - Nephro eval noted\par - Podiatry followup needed\par \par #CKD\par - Cr 1.6, eGFR 43 (12/27/22)--->1.9 GFR 35 April 2023\par - Seen by Nephro 7/2023\par - RBUS 7/23 shows non obstructive 4mm right renal stone and left lower renal pole cyst ~1.4 cm(no complex features described on US)\par - Encouraged adequate hydration ~60 ounces. Educated on low salt diet, low glycemic index diet, and medication compliance. No renal contraindication to increase dose of zyprexa. F/u with nephro in 6 months with repeat CMP. \par \par # HLD\par - Lipid profile normal, lower lvl HDL (4/2023)\par - c/w Pravastatin 40 mg\par \par # HCM\par - Covid vaccine: pt has received 2 shots of Covid vaccine + Covid booster in 1st week of Jan 2022\par - Flu vaccine: given 10/13/2022\par - Gyn appointment 5/18/23; uncooperative for annual exam, recommended that benefit of performing yearly exams is outweighed by patient discomfort, only bring patient back if any issues arise\par - routine labs, EKG attempted today but pt not cooperative \par - medication renewed\par - RTC 3 months

## 2023-07-27 NOTE — END OF VISIT
[] : Resident [FreeTextEntry3] : Pt. here for follow up.  Pt. did not complete EKG ordered, and did not schedule podiatry appointment.  Advised staff to schedule appointment.  Blood work 4/11/23 25-OH Vit. D 80, BUN/Creat 27/1.9, , triglyceride 91, TSH 0.14 (levothyroxine was reduced to 75mcg daily on 4/11/23), A1C 5.9, FT4 1.6.  Will d/c Vitamin D3 supplement.  Had eye exam 1/17/23 Medication renewed.  Blood work ordered.  Follow renal for CKD-3B, endo for NIDDM, neuro for pseudobulbar affect.  RTC 3 months.\par

## 2023-07-27 NOTE — PHYSICAL EXAM
[Well Nourished] : well nourished [No Respiratory Distress] : no respiratory distress  [No Accessory Muscle Use] : no accessory muscle use [Clear to Auscultation] : lungs were clear to auscultation bilaterally [Normal Rate] : normal rate  [Regular Rhythm] : with a regular rhythm [Normal S1, S2] : normal S1 and S2 [No Murmur] : no murmur heard [de-identified] : Patient screaming

## 2023-08-01 DIAGNOSIS — K59.00 CONSTIPATION, UNSPECIFIED: ICD-10-CM

## 2023-08-01 DIAGNOSIS — Z86.39 PERSONAL HISTORY OF OTHER ENDOCRINE, NUTRITIONAL AND METABOLIC DISEASE: ICD-10-CM

## 2023-08-01 DIAGNOSIS — E78.00 PURE HYPERCHOLESTEROLEMIA, UNSPECIFIED: ICD-10-CM

## 2023-08-01 DIAGNOSIS — E11.9 TYPE 2 DIABETES MELLITUS WITHOUT COMPLICATIONS: ICD-10-CM

## 2023-08-01 DIAGNOSIS — Z00.00 ENCOUNTER FOR GENERAL ADULT MEDICAL EXAMINATION WITHOUT ABNORMAL FINDINGS: ICD-10-CM

## 2023-08-01 DIAGNOSIS — N18.32 CHRONIC KIDNEY DISEASE, STAGE 3B: ICD-10-CM

## 2023-08-01 DIAGNOSIS — E03.9 HYPOTHYROIDISM, UNSPECIFIED: ICD-10-CM

## 2023-08-15 ENCOUNTER — APPOINTMENT (OUTPATIENT)
Dept: PEDIATRIC DEVELOPMENTAL SERVICES | Facility: CLINIC | Age: 33
End: 2023-08-15
Payer: COMMERCIAL

## 2023-08-15 ENCOUNTER — OUTPATIENT (OUTPATIENT)
Dept: OUTPATIENT SERVICES | Facility: HOSPITAL | Age: 33
LOS: 1 days | End: 2023-08-15
Payer: COMMERCIAL

## 2023-08-15 VITALS — HEIGHT: 63 IN | BODY MASS INDEX: 21.62 KG/M2 | TEMPERATURE: 97 F | OXYGEN SATURATION: 97 % | WEIGHT: 122 LBS

## 2023-08-15 DIAGNOSIS — R32 UNSPECIFIED URINARY INCONTINENCE: ICD-10-CM

## 2023-08-15 DIAGNOSIS — Z86.39 PERSONAL HISTORY OF OTHER ENDOCRINE, NUTRITIONAL AND METABOLIC DISEASE: ICD-10-CM

## 2023-08-15 DIAGNOSIS — Z00.00 ENCOUNTER FOR GENERAL ADULT MEDICAL EXAMINATION WITHOUT ABNORMAL FINDINGS: ICD-10-CM

## 2023-08-15 PROCEDURE — 99214 OFFICE O/P EST MOD 30 MIN: CPT

## 2023-08-15 PROCEDURE — 99214 OFFICE O/P EST MOD 30 MIN: CPT | Mod: GC

## 2023-08-15 RX ORDER — HYDROCORTISONE 1 %
12 CREAM (GRAM) TOPICAL
Refills: 0 | Status: ACTIVE | COMMUNITY

## 2023-08-15 RX ORDER — ISOPROPYL ALCOHOL 0.7 ML/ML
SWAB TOPICAL
Qty: 30 | Refills: 0 | Status: ACTIVE | COMMUNITY

## 2023-08-15 RX ORDER — BLOOD SUGAR DIAGNOSTIC
STRIP MISCELLANEOUS TWICE DAILY
Qty: 100 | Refills: 11 | Status: ACTIVE | COMMUNITY
Start: 2019-06-24

## 2023-08-15 RX ORDER — LANCETS
EACH MISCELLANEOUS
Qty: 100 | Refills: 6 | Status: ACTIVE | COMMUNITY
Start: 2020-03-09

## 2023-08-15 RX ORDER — PROPRANOLOL HYDROCHLORIDE 20 MG/1
20 TABLET ORAL
Refills: 0 | Status: ACTIVE | COMMUNITY

## 2023-08-15 RX ORDER — TOPIRAMATE 100 MG/1
100 TABLET, FILM COATED ORAL
Qty: 30 | Refills: 2 | Status: ACTIVE | COMMUNITY

## 2023-08-15 RX ORDER — OLANZAPINE 2.5 MG/1
2.5 TABLET, FILM COATED ORAL
Refills: 0 | Status: DISCONTINUED | COMMUNITY
End: 2023-08-15

## 2023-08-15 RX ORDER — OLANZAPINE 7.5 MG/1
7.5 TABLET, FILM COATED ORAL
Refills: 0 | Status: DISCONTINUED | COMMUNITY
End: 2023-08-15

## 2023-08-15 NOTE — ASSESSMENT
[FreeTextEntry1] : This is a 33-year-old female with autism, T2DM, HLD, hypothyroidism, CKD here for follow-up for non-obstructing kidney stone, and behavioral disturbances.   # Autism # Mood disorder - Worsening behavioral disturbances  - on Divalproex, Lorazepam, Nuedexta, Olanzapine, Topiramate, Propranolol - patient might benefit from  follow up for behavioral disturbances   #Urinary incontinence  - F/u UA, Ucx to r/o UTI  - Urology referral given   # Hypothyroidism - TSH 0.14 4/2023 -> 0.32 - seem by endo 5/2023, decreased levothyroxine from 88 to 75mg - c/w Levothyroxine 75 mcg  # Vitamin D deficiency - resolved  - Vit D level normal July 2023  # Constipation - c/w Colace and increased Miralax  - C/W high fiber diet - Counselled aide to make a Diary documenting bowel movements  #DM-2 - HbA1c 7/2023 5.9 - c/w Januvia, Pioglitazone - Farxiga 10 mg started by Endo - seen by ophthalmology for retinopathy eval (1/2023), recommended annual evaluation  #CKD - Cr 1.6, eGFR 43 (12/27/22)--->1.9 GFR 35 April 2023 ---> 1.6 07/2023 - Seen by Nephro 7/2023 - RBUS 7/23 shows non obstructive 4mm right renal stone and left lower renal pole cyst ~1.4 cm(no complex features described on US) - Encouraged adequate hydration ~60 ounces. Educated on low salt diet, low glycemic index diet, and medication compliance. No renal contraindication to increase dose of zyprexa. F/u with nephro in 6 months with repeat CMP. - F/u nephrology   # HLD - c/w Pravastatin 40 mg  # HCM - Covid vaccine: pt has received 2 shots of Covid vaccine + Covid booster in 1st week of Jan 2022 - Flu vaccine: given 10/13/2022 - Gyn appointment 5/18/23; uncooperative for annual exam, recommended that benefit of performing yearly exams is outweighed by patient discomfort, only bring patient back if any issues arise - medication renewed - RTC 3 months.

## 2023-08-15 NOTE — END OF VISIT
[] : Resident [FreeTextEntry3] : Pt. here for follow up.  Blood work 7/27/23 LDL 74, triglyceride 66, BUN/Creat 28/1.6 (was 27/1.9 4/11/23), TSH 0.32, A1C 5.9.  Seen renal 7/11/23, recommended hydration especially kidney/bladder sono 7/11/23 showed nonobstructing right renal calculus 0.4cm, no hydronephrosis b/l; and 1.4x1.6x1.4cm lower pole cyst left kidney.  Will order UA/C+S for urinary incontinence,  referral.  RTC 3 months

## 2023-08-15 NOTE — HISTORY OF PRESENT ILLNESS
[Formal Caregiver] : formal caregiver [FreeTextEntry1] : Follow up visit  [de-identified] : This is a 33-year-old female with autism, T2DM, HLD, hypothyroidism, CKD here for follow-up for non-obstructing kidney stone, behavioral disturbances. Accompanied by staff Shanna and Lavelle. Patient is uncooperative and unable to obtain ROS from patient, unable to obtain vitals or perform physical exam.    Seen by Neuro 4/2023 they will continue to prescribe Nuedexta, however aide reports patient is more uncooperative as of lately.  Seen by Endo 5/2023. TSH 0.14. Patient was taking Levothyroxine 88 mcg and decreased to 75 mcg daily  Seen by GYN 5/2023 uncooperative for annual exam, recommended that benefit of performing yearly exams is outweighed by patient discomfort, only bring patient back if any issues arise.  Seen by Nephro 7/2023 Cr 1.6, eGFR 43 (12/27/22)--->1.9 GFR 35 April 2023. RBUS 7/23 shows non obstructive 4mm right renal stone and left lower renal pole cyst ~1.4 cm(no complex features described on US). Encouraged adequate hydration ~60 ounces. Educated on low salt diet, low glycemic index diet, and medication compliance. No renal contraindication to increase dose of zyprexa. RTC 6 months with repeat CMP.

## 2023-08-16 ENCOUNTER — OUTPATIENT (OUTPATIENT)
Dept: OUTPATIENT SERVICES | Facility: HOSPITAL | Age: 33
LOS: 1 days | End: 2023-08-16
Payer: COMMERCIAL

## 2023-08-16 DIAGNOSIS — R32 UNSPECIFIED URINARY INCONTINENCE: ICD-10-CM

## 2023-08-16 PROCEDURE — 81003 URINALYSIS AUTO W/O SCOPE: CPT

## 2023-08-16 PROCEDURE — 87086 URINE CULTURE/COLONY COUNT: CPT

## 2023-08-17 DIAGNOSIS — R32 UNSPECIFIED URINARY INCONTINENCE: ICD-10-CM

## 2023-08-17 LAB
APPEARANCE: CLEAR
BACTERIA UR CULT: NORMAL
BILIRUBIN URINE: NEGATIVE
BLOOD URINE: NEGATIVE
COLOR: YELLOW
GLUCOSE QUALITATIVE U: 500 MG/DL
KETONES URINE: NEGATIVE MG/DL
LEUKOCYTE ESTERASE URINE: NEGATIVE
NITRITE URINE: NEGATIVE
PH URINE: 7
PROTEIN URINE: NEGATIVE MG/DL
SPECIFIC GRAVITY URINE: 1.01
UROBILINOGEN URINE: 0.2 MG/DL

## 2023-08-18 DIAGNOSIS — Z86.39 PERSONAL HISTORY OF OTHER ENDOCRINE, NUTRITIONAL AND METABOLIC DISEASE: ICD-10-CM

## 2023-08-18 DIAGNOSIS — R32 UNSPECIFIED URINARY INCONTINENCE: ICD-10-CM

## 2023-08-18 DIAGNOSIS — E03.9 HYPOTHYROIDISM, UNSPECIFIED: ICD-10-CM

## 2023-08-18 DIAGNOSIS — R73.03 PREDIABETES: ICD-10-CM

## 2023-08-18 DIAGNOSIS — E66.9 OBESITY, UNSPECIFIED: ICD-10-CM

## 2023-08-18 DIAGNOSIS — N18.32 CHRONIC KIDNEY DISEASE, STAGE 3B: ICD-10-CM

## 2023-08-18 DIAGNOSIS — K59.00 CONSTIPATION, UNSPECIFIED: ICD-10-CM

## 2023-08-18 DIAGNOSIS — N20.0 CALCULUS OF KIDNEY: ICD-10-CM

## 2023-08-18 DIAGNOSIS — N18.9 CHRONIC KIDNEY DISEASE, UNSPECIFIED: ICD-10-CM

## 2023-08-18 DIAGNOSIS — E11.9 TYPE 2 DIABETES MELLITUS WITHOUT COMPLICATIONS: ICD-10-CM

## 2023-09-20 ENCOUNTER — OUTPATIENT (OUTPATIENT)
Dept: OUTPATIENT SERVICES | Facility: HOSPITAL | Age: 33
LOS: 1 days | End: 2023-09-20
Payer: MEDICAID

## 2023-09-20 DIAGNOSIS — Z01.21 ENCOUNTER FOR DENTAL EXAMINATION AND CLEANING WITH ABNORMAL FINDINGS: ICD-10-CM

## 2023-09-20 DIAGNOSIS — Z01.20 ENCOUNTER FOR DENTAL EXAMINATION AND CLEANING WITHOUT ABNORMAL FINDINGS: ICD-10-CM

## 2023-09-20 PROCEDURE — D9997: CPT

## 2023-09-20 PROCEDURE — D1110: CPT

## 2023-09-20 PROCEDURE — D0120: CPT

## 2023-09-26 ENCOUNTER — RX RENEWAL (OUTPATIENT)
Age: 33
End: 2023-09-26

## 2023-09-26 ENCOUNTER — APPOINTMENT (OUTPATIENT)
Dept: NEPHROLOGY | Facility: CLINIC | Age: 33
End: 2023-09-26

## 2023-10-05 ENCOUNTER — RX RENEWAL (OUTPATIENT)
Age: 33
End: 2023-10-05

## 2023-10-05 RX ORDER — DEXTROSE 3.75 G
4 TABLET,CHEWABLE ORAL
Qty: 20 | Refills: 6 | Status: ACTIVE | COMMUNITY
Start: 2020-12-29 | End: 1900-01-01

## 2023-10-12 ENCOUNTER — RX RENEWAL (OUTPATIENT)
Age: 33
End: 2023-10-12

## 2023-10-26 LAB
ALBUMIN SERPL ELPH-MCNC: 3.8 G/DL
ALP BLD-CCNC: 55 U/L
ALT SERPL-CCNC: 14 U/L
ANION GAP SERPL CALC-SCNC: 10 MMOL/L
AST SERPL-CCNC: 27 U/L
BILIRUB SERPL-MCNC: 0.3 MG/DL
BUN SERPL-MCNC: 26 MG/DL
CALCIUM SERPL-MCNC: 8.7 MG/DL
CHLORIDE SERPL-SCNC: 106 MMOL/L
CHOLEST SERPL-MCNC: 133 MG/DL
CO2 SERPL-SCNC: 24 MMOL/L
CREAT SERPL-MCNC: 1.8 MG/DL
EGFR: 37 ML/MIN/1.73M2
ESTIMATED AVERAGE GLUCOSE: 131 MG/DL
GLUCOSE SERPL-MCNC: 113 MG/DL
HBA1C MFR BLD HPLC: 6.2 %
HDLC SERPL-MCNC: 46 MG/DL
LDLC SERPL CALC-MCNC: 71 MG/DL
NONHDLC SERPL-MCNC: 87 MG/DL
POTASSIUM SERPL-SCNC: 4.2 MMOL/L
PROT SERPL-MCNC: 6.9 G/DL
SODIUM SERPL-SCNC: 140 MMOL/L
T3 SERPL-MCNC: 100 NG/DL
T4 FREE SERPL-MCNC: 1.1 NG/DL
TRIGL SERPL-MCNC: 80 MG/DL
TSH SERPL-ACNC: 1.72 UIU/ML

## 2023-11-01 ENCOUNTER — APPOINTMENT (OUTPATIENT)
Dept: NEUROLOGY | Facility: CLINIC | Age: 33
End: 2023-11-01
Payer: COMMERCIAL

## 2023-11-01 VITALS — WEIGHT: 120 LBS | HEIGHT: 63 IN | BODY MASS INDEX: 21.26 KG/M2

## 2023-11-01 PROCEDURE — 99213 OFFICE O/P EST LOW 20 MIN: CPT

## 2023-11-02 LAB
24R-OH-CALCIDIOL SERPL-MCNC: 19.4 PG/ML
C PEPTIDE SERPL-MCNC: 1.5 NG/ML
VIT B12 SERPL-MCNC: 773 PG/ML

## 2023-11-07 ENCOUNTER — APPOINTMENT (OUTPATIENT)
Dept: ENDOCRINOLOGY | Facility: CLINIC | Age: 33
End: 2023-11-07
Payer: COMMERCIAL

## 2023-11-07 VITALS
HEART RATE: 75 BPM | SYSTOLIC BLOOD PRESSURE: 118 MMHG | DIASTOLIC BLOOD PRESSURE: 72 MMHG | WEIGHT: 162 LBS | BODY MASS INDEX: 28.7 KG/M2 | HEIGHT: 63 IN | OXYGEN SATURATION: 98 %

## 2023-11-07 PROCEDURE — 99214 OFFICE O/P EST MOD 30 MIN: CPT

## 2023-11-16 ENCOUNTER — APPOINTMENT (OUTPATIENT)
Dept: UROLOGY | Facility: CLINIC | Age: 33
End: 2023-11-16
Payer: COMMERCIAL

## 2023-11-16 ENCOUNTER — OUTPATIENT (OUTPATIENT)
Dept: OUTPATIENT SERVICES | Facility: HOSPITAL | Age: 33
LOS: 1 days | End: 2023-11-16
Payer: COMMERCIAL

## 2023-11-16 VITALS — HEIGHT: 63 IN | BODY MASS INDEX: 28.88 KG/M2 | WEIGHT: 163 LBS

## 2023-11-16 DIAGNOSIS — N20.0 CALCULUS OF KIDNEY: ICD-10-CM

## 2023-11-16 DIAGNOSIS — Z00.00 ENCOUNTER FOR GENERAL ADULT MEDICAL EXAMINATION WITHOUT ABNORMAL FINDINGS: ICD-10-CM

## 2023-11-16 DIAGNOSIS — N28.1 CYST OF KIDNEY, ACQUIRED: ICD-10-CM

## 2023-11-16 PROCEDURE — 99203 OFFICE O/P NEW LOW 30 MIN: CPT

## 2023-11-28 ENCOUNTER — APPOINTMENT (OUTPATIENT)
Dept: NEPHROLOGY | Facility: CLINIC | Age: 33
End: 2023-11-28
Payer: COMMERCIAL

## 2023-11-28 ENCOUNTER — OUTPATIENT (OUTPATIENT)
Dept: OUTPATIENT SERVICES | Facility: HOSPITAL | Age: 33
LOS: 1 days | End: 2023-11-28
Payer: COMMERCIAL

## 2023-11-28 VITALS — HEIGHT: 63 IN | OXYGEN SATURATION: 97 % | BODY MASS INDEX: 28.88 KG/M2 | WEIGHT: 163 LBS | HEART RATE: 75 BPM

## 2023-11-28 DIAGNOSIS — Z00.00 ENCOUNTER FOR GENERAL ADULT MEDICAL EXAMINATION WITHOUT ABNORMAL FINDINGS: ICD-10-CM

## 2023-11-28 DIAGNOSIS — N20.0 CALCULUS OF KIDNEY: ICD-10-CM

## 2023-11-28 DIAGNOSIS — N28.1 CYST OF KIDNEY, ACQUIRED: ICD-10-CM

## 2023-11-28 PROCEDURE — 99213 OFFICE O/P EST LOW 20 MIN: CPT

## 2023-11-29 DIAGNOSIS — E11.9 TYPE 2 DIABETES MELLITUS WITHOUT COMPLICATIONS: ICD-10-CM

## 2023-11-29 DIAGNOSIS — N18.32 CHRONIC KIDNEY DISEASE, STAGE 3B: ICD-10-CM

## 2023-11-29 DIAGNOSIS — E78.00 PURE HYPERCHOLESTEROLEMIA, UNSPECIFIED: ICD-10-CM

## 2023-11-29 DIAGNOSIS — N18.9 CHRONIC KIDNEY DISEASE, UNSPECIFIED: ICD-10-CM

## 2023-12-09 RX ORDER — PROPRANOLOL HYDROCHLORIDE 10 MG/1
10 TABLET ORAL
Refills: 0 | Status: DISCONTINUED | COMMUNITY
End: 2023-12-09

## 2023-12-09 RX ORDER — OLANZAPINE 10 MG/1
10 TABLET, FILM COATED ORAL
Refills: 0 | Status: ACTIVE | COMMUNITY
Start: 2023-08-15

## 2023-12-09 RX ORDER — CHLORPROMAZINE HYDROCHLORIDE 50 MG/1
50 TABLET, SUGAR COATED ORAL
Refills: 0 | Status: DISCONTINUED | COMMUNITY
Start: 2023-08-15 | End: 2023-12-09

## 2023-12-09 RX ORDER — LORAZEPAM 1 MG/1
1 TABLET ORAL
Refills: 0 | Status: DISCONTINUED | COMMUNITY
End: 2023-12-09

## 2023-12-11 ENCOUNTER — OUTPATIENT (OUTPATIENT)
Dept: OUTPATIENT SERVICES | Facility: HOSPITAL | Age: 33
LOS: 1 days | End: 2023-12-11
Payer: COMMERCIAL

## 2023-12-11 ENCOUNTER — APPOINTMENT (OUTPATIENT)
Dept: PEDIATRIC DEVELOPMENTAL SERVICES | Facility: CLINIC | Age: 33
End: 2023-12-11
Payer: COMMERCIAL

## 2023-12-11 VITALS — HEIGHT: 63 IN | WEIGHT: 161 LBS | OXYGEN SATURATION: 97 % | TEMPERATURE: 97.3 F | BODY MASS INDEX: 28.53 KG/M2

## 2023-12-11 DIAGNOSIS — Z00.00 ENCOUNTER FOR GENERAL ADULT MEDICAL EXAMINATION WITHOUT ABNORMAL FINDINGS: ICD-10-CM

## 2023-12-11 DIAGNOSIS — Z23 ENCOUNTER FOR IMMUNIZATION: ICD-10-CM

## 2023-12-11 PROCEDURE — 99214 OFFICE O/P EST MOD 30 MIN: CPT | Mod: GC

## 2023-12-11 PROCEDURE — 84443 ASSAY THYROID STIM HORMONE: CPT

## 2023-12-11 PROCEDURE — 99214 OFFICE O/P EST MOD 30 MIN: CPT

## 2023-12-11 PROCEDURE — 90471 IMMUNIZATION ADMIN: CPT | Mod: 25

## 2023-12-11 PROCEDURE — 36415 COLL VENOUS BLD VENIPUNCTURE: CPT

## 2023-12-11 PROCEDURE — 82306 VITAMIN D 25 HYDROXY: CPT

## 2023-12-11 RX ORDER — PIOGLITAZONE HYDROCHLORIDE 30 MG/1
30 TABLET ORAL DAILY
Qty: 30 | Refills: 6 | Status: ACTIVE | COMMUNITY
Start: 2021-07-27 | End: 1900-01-01

## 2023-12-11 RX ORDER — LANCETS 28 GAUGE
EACH MISCELLANEOUS
Qty: 60 | Refills: 5 | Status: ACTIVE | COMMUNITY
Start: 1900-01-01 | End: 1900-01-01

## 2023-12-11 RX ORDER — SITAGLIPTIN 50 MG/1
50 TABLET, FILM COATED ORAL DAILY
Qty: 30 | Refills: 6 | Status: ACTIVE | COMMUNITY
Start: 2023-01-05 | End: 1900-01-01

## 2023-12-12 DIAGNOSIS — Z23 ENCOUNTER FOR IMMUNIZATION: ICD-10-CM

## 2023-12-12 DIAGNOSIS — R79.89 OTHER SPECIFIED ABNORMAL FINDINGS OF BLOOD CHEMISTRY: ICD-10-CM

## 2023-12-12 DIAGNOSIS — N18.9 CHRONIC KIDNEY DISEASE, UNSPECIFIED: ICD-10-CM

## 2023-12-12 DIAGNOSIS — K59.00 CONSTIPATION, UNSPECIFIED: ICD-10-CM

## 2023-12-12 DIAGNOSIS — N18.32 CHRONIC KIDNEY DISEASE, STAGE 3B: ICD-10-CM

## 2023-12-12 DIAGNOSIS — E03.9 HYPOTHYROIDISM, UNSPECIFIED: ICD-10-CM

## 2023-12-12 DIAGNOSIS — E78.00 PURE HYPERCHOLESTEROLEMIA, UNSPECIFIED: ICD-10-CM

## 2023-12-12 DIAGNOSIS — N20.0 CALCULUS OF KIDNEY: ICD-10-CM

## 2023-12-12 DIAGNOSIS — E11.9 TYPE 2 DIABETES MELLITUS WITHOUT COMPLICATIONS: ICD-10-CM

## 2023-12-12 DIAGNOSIS — Z00.00 ENCOUNTER FOR GENERAL ADULT MEDICAL EXAMINATION WITHOUT ABNORMAL FINDINGS: ICD-10-CM

## 2023-12-19 ENCOUNTER — APPOINTMENT (OUTPATIENT)
Dept: ENDOCRINOLOGY | Facility: CLINIC | Age: 33
End: 2023-12-19
Payer: COMMERCIAL

## 2023-12-19 DIAGNOSIS — F39 UNSPECIFIED MOOD [AFFECTIVE] DISORDER: ICD-10-CM

## 2023-12-19 PROCEDURE — 99214 OFFICE O/P EST MOD 30 MIN: CPT

## 2023-12-19 NOTE — ASSESSMENT
[Diabetes Foot Care] : diabetes foot care [Long Term Vascular Complications] : long term vascular complications of diabetes [Carbohydrate Consistent Diet] : carbohydrate consistent diet [Importance of Diet and Exercise] : importance of diet and exercise to improve glycemic control, achieve weight loss and improve cardiovascular health [Exercise/Effect on Glucose] : exercise/effect on glucose [Hypoglycemia Management] : hypoglycemia management [Ketone Testing] : ketone testing [Self Monitoring of Blood Glucose] : self monitoring of blood glucose [Retinopathy Screening] : Patient was referred to ophthalmology for retinopathy screening [Diabetic Medications] : Risks and benefits of diabetic medications were discussed [Levothyroxine] : The patient was instructed to take Levothyroxine on an empty stomach, separate from vitamins, and wait at least 30 minutes before eating [FreeTextEntry4] : [address] diet, exercise [FreeTextEntry1] : diet and exercise, will refer for 24 hour urine and AM cortisol. Telephone call to to RN to follow up on on patient recent history of low platelet count in October due to easy bruising.

## 2023-12-19 NOTE — REVIEW OF SYSTEMS
[Hair Loss] : hair loss [Tremors] : tremors [Depression] : depression [Suicidal Ideation] : no suicidal ideation [Insomnia] : no insomnia [Anxiety] : anxiety [Homicidal Ideation] : no homicidal ideation [Stress] : no stress [Easy Bruising] : a tendency for easy bruising [Negative] : Musculoskeletal [de-identified] : agitated and screaming [FreeTextEntry2] : agitated

## 2023-12-19 NOTE — HISTORY OF PRESENT ILLNESS
[FreeTextEntry1] : Patient came today with her Nurse at the home and care giver. She is very agitated and screaming , hitting and kicking.  Her nurse was concern of her swelling, bruises in her body, agitation, and moon face. She was said the the PCP and her was suggesting to do [place] workup. No recent labs, but noted in October her platelets were low at 113. Will order labs

## 2023-12-19 NOTE — PHYSICAL EXAM
[Alert] : alert [Well Nourished] : well nourished [Healthy Appearance] : healthy appearance [No Acute Distress] : no acute distress [Well Developed] : well developed [Normal Sclera/Conjunctiva] : normal sclera/conjunctiva [EOMI] : extra ocular movement intact [PERRL] : pupils equal, round and reactive to light [No Proptosis] : no proptosis [Normal Outer Ear/Nose] : the ears and nose were normal in appearance [Normal Hearing] : hearing was normal [Supple] : the neck was supple [Thyroid Not Enlarged] : the thyroid was not enlarged [No Respiratory Distress] : no respiratory distress [No Accessory Muscle Use] : no accessory muscle use [Normal Rate and Effort] : normal respiratory rate and effort [Clear to Auscultation] : lungs were clear to auscultation bilaterally [Normal S1, S2] : normal S1 and S2 [Normal Rate] : heart rate was normal [Regular Rhythm] : with a regular rhythm [Carotids Normal] : carotid pulses were normal with no bruits [No Edema] : no peripheral edema [Pedal Pulses Normal] : the pedal pulses are present [Normal Bowel Sounds] : normal bowel sounds [Not Tender] : non-tender [Not Distended] : not distended [Soft] : abdomen soft [Normal Anterior Cervical Nodes] : no anterior cervical lymphadenopathy [No CVA Tenderness] : no ~M costovertebral angle tenderness [No Spinal Tenderness] : no spinal tenderness [Spine Straight] : spine straight [No Stigmata of Cushings Syndrome] : no stigmata of Cushings Syndrome [Normal Gait] : normal gait [Normal Strength/Tone] : muscle strength and tone were normal [No Rash] : no rash [Acanthosis Nigricans] : no acanthosis nigricans [Foot Ulcers] : no foot ulcers [Delayed in the Right Toes] : normal in the toes [Delayed in the Left Toes] : normal in the toes [de-identified] :  not COOPERATIVE, DOES NOT TALK, AUTISM, screaming [de-identified] : DM2 [de-identified] : has bruising in her legs [de-identified] : slight tremor [de-identified] : AUTISM, NON VERBAL, agitated and kicking

## 2023-12-22 ENCOUNTER — LABORATORY RESULT (OUTPATIENT)
Age: 33
End: 2023-12-22

## 2023-12-22 LAB
25(OH)D3 SERPL-MCNC: 89 NG/ML
TSH SERPL-ACNC: 3.14 UIU/ML

## 2023-12-22 RX ORDER — CHOLECALCIFEROL (VITAMIN D3) 1250 MCG
1.25 MG CAPSULE ORAL
Qty: 4 | Refills: 6 | Status: DISCONTINUED | COMMUNITY
Start: 2023-11-07 | End: 2023-12-22

## 2024-01-01 NOTE — ASSESSMENT
[FreeTextEntry1] : 32 F PMH of autism, T2DM, HLD, hypothyroidism, presenting in office for follow up after nose  injury. Patient was seen by ENT Dr. Block, was found to have nose laceration and closed fracture of nasal bone. There was no indication to repair. \par \par # Nose injury \par - Closed fracture of nasal bone on X-ray \par - Seen by ENT Dr. Block, no indication to repair \par - On physical exam, bruising resolving\par \par # Autism\par # Mood disorder\par - on Divalproex, Nuedexta, Olanzapine, Topiramate, Propranolol.\par - f/u with neuro \par \par # Hypothyroidism\par - TSH 0.54 within normal limits with current medication (12/27/2022)\par - C/W Levothyroxine 88 mcg\par - f/u endo \par \par # Vitamin D deficiency\par - Vit D level normal (July 8th 2022)\par - c/w vitamin D supplementation. \par \par # Constipation\par - passing soft stools alternate day, normal frequency for the patient  \par - c/w Colace and Miralax; high fiber diet\par \par #DM-2\par - HbA1c- 5.7; FBS- 117)\par - c/w Januvia, Pioglitazone. \par - d/c metformin\par - Started on Farxiga 10 mg by endo \par - endo f/u op\par - ophthalmology f/up\par - creatinine increasing 1.6 eGFR 43 ( CKD 3) and large blood on urine dipstick ( 12/27/2022) - Nephrology referral  \par \par #Obesity\par - BMI decreased to 27.81 \par - c/w exercise and diet: low fat low carb low sugar high fiber \par \par # HLD\par - Lipid profile normal, lower lvl HDL (12/27/2022)\par - c/w Pravastatin 40 mg\par \par # HCM\par - Covid vaccine: pt has received 2 shots of Covid vaccine + Covid booster in 1st week of Jan 2022\par - Flu vaccine: given 10/13/2022\par - RTC 3 months. Negative

## 2024-01-02 ENCOUNTER — APPOINTMENT (OUTPATIENT)
Dept: ENDOCRINOLOGY | Facility: CLINIC | Age: 34
End: 2024-01-02
Payer: COMMERCIAL

## 2024-01-02 DIAGNOSIS — Z83.2 FAMILY HISTORY OF DISEASES OF THE BLOOD AND BLOOD-FORMING ORGANS AND CERTAIN DISORDERS INVOLVING THE IMMUNE MECHANISM: ICD-10-CM

## 2024-01-02 PROCEDURE — 99442: CPT

## 2024-01-04 RX ORDER — DAPAGLIFLOZIN 10 MG/1
10 TABLET, FILM COATED ORAL DAILY
Qty: 30 | Refills: 6 | Status: DISCONTINUED | COMMUNITY
Start: 2023-01-05 | End: 2024-01-04

## 2024-01-04 NOTE — REVIEW OF SYSTEMS
[Easy Bruising] : a tendency for easy bruising [Swelling] : swelling [Negative] : Endocrine [FreeTextEntry2] : irritable

## 2024-01-04 NOTE — HISTORY OF PRESENT ILLNESS
[FreeTextEntry1] : spoke with AYAAN Blackwell regarding patient labs [phone]. patient currently in group home. patient LMP 10/23. She mentioned that grandmother of patient has Von Willebrand. patient continue to bruise easily and noted swelling of both feet.  Reviewed labs, complaint of easy bruizing and her aptt is 47.8 H. hgba1c 6.2, glucose 162, bun 26, creatinine 1.8, gfr 37, tsh 3.14, cortisol 19.2 H, testosterone 62.7 H, prolactin 71.4 H, shbg 178 H, vit d 25.9, acth 63.2 N, aldosterone 33.4 H, tpo 42.9 H,  patient did not do 24 hour urine, the nurse was out sick for 1 week due to covid. Will try next week to get the urine.  ADDENDUM: 1/4/24 telephone to patient, spoke to Rosalva -[phone]. Review chart and medication list and recent lab results with Dr. Zamudio. Noted patient might be dehydrated and need to increase fluid and either decrease or discontinue Farxiga . patient current hgba1c is 6.2. Will send order to patient group home to discontinue Farxiga noted high Na+ & Aldosterone and will repeat labs next visit. Rosalva Lee stated she will start the 24 hour urine collection.

## 2024-01-04 NOTE — REASON FOR VISIT
[Home] : at home, [unfilled] , at the time of the visit. [Medical Office: ([facility])___] : at the medical office located in  [Verbal consent obtained from patient] : the patient, [unfilled] [FreeTextEntry3] : Rosalva -[phone]

## 2024-01-04 NOTE — DATA REVIEWED
[FreeTextEntry1] : 12/22/23 Reviewed labs, complaint of easy bruizing and her aptt is 47.8 H. hgba1c 6.2, glucose 162, bun 26, creatinine 1.8, gfr 37, tsh 3.14, cortisol 19.2 H, testosterone 62.7 H, prolactin 71.4 H, shbg 178 H, vit d 25.9, acth 63.2 N, aldosterone 33.4 H, tpo 42.9 H,  patient did not do 24 hour urine.

## 2024-01-10 ENCOUNTER — NON-APPOINTMENT (OUTPATIENT)
Age: 34
End: 2024-01-10

## 2024-01-25 ENCOUNTER — APPOINTMENT (OUTPATIENT)
Dept: OBGYN | Facility: CLINIC | Age: 34
End: 2024-01-25
Payer: COMMERCIAL

## 2024-01-25 ENCOUNTER — OUTPATIENT (OUTPATIENT)
Dept: OUTPATIENT SERVICES | Facility: HOSPITAL | Age: 34
LOS: 1 days | End: 2024-01-25
Payer: COMMERCIAL

## 2024-01-25 VITALS — DIASTOLIC BLOOD PRESSURE: 79 MMHG | SYSTOLIC BLOOD PRESSURE: 128 MMHG | BODY MASS INDEX: 28.87 KG/M2 | WEIGHT: 163 LBS

## 2024-01-25 DIAGNOSIS — Z00.00 ENCOUNTER FOR GENERAL ADULT MEDICAL EXAMINATION WITHOUT ABNORMAL FINDINGS: ICD-10-CM

## 2024-01-25 DIAGNOSIS — N91.2 AMENORRHEA, UNSPECIFIED: ICD-10-CM

## 2024-01-25 PROCEDURE — 99214 OFFICE O/P EST MOD 30 MIN: CPT | Mod: 95

## 2024-01-25 PROCEDURE — 99214 OFFICE O/P EST MOD 30 MIN: CPT

## 2024-01-30 DIAGNOSIS — N91.2 AMENORRHEA, UNSPECIFIED: ICD-10-CM

## 2024-01-31 ENCOUNTER — APPOINTMENT (OUTPATIENT)
Dept: PEDIATRIC DEVELOPMENTAL SERVICES | Facility: CLINIC | Age: 34
End: 2024-01-31
Payer: COMMERCIAL

## 2024-01-31 ENCOUNTER — OUTPATIENT (OUTPATIENT)
Dept: OUTPATIENT SERVICES | Facility: HOSPITAL | Age: 34
LOS: 1 days | End: 2024-01-31
Payer: COMMERCIAL

## 2024-01-31 VITALS
BODY MASS INDEX: 28.78 KG/M2 | OXYGEN SATURATION: 97 % | SYSTOLIC BLOOD PRESSURE: 126 MMHG | WEIGHT: 162.44 LBS | TEMPERATURE: 97.6 F | HEIGHT: 63 IN | DIASTOLIC BLOOD PRESSURE: 85 MMHG | HEART RATE: 61 BPM

## 2024-01-31 DIAGNOSIS — E03.9 HYPOTHYROIDISM, UNSPECIFIED: ICD-10-CM

## 2024-01-31 DIAGNOSIS — R79.89 OTHER SPECIFIED ABNORMAL FINDINGS OF BLOOD CHEMISTRY: ICD-10-CM

## 2024-01-31 DIAGNOSIS — N18.32 CHRONIC KIDNEY DISEASE, STAGE 3B: ICD-10-CM

## 2024-01-31 DIAGNOSIS — E78.00 PURE HYPERCHOLESTEROLEMIA, UNSPECIFIED: ICD-10-CM

## 2024-01-31 DIAGNOSIS — Z00.00 ENCOUNTER FOR GENERAL ADULT MEDICAL EXAMINATION W/OUT ABNORMAL FINDINGS: ICD-10-CM

## 2024-01-31 DIAGNOSIS — Z00.00 ENCOUNTER FOR GENERAL ADULT MEDICAL EXAMINATION WITHOUT ABNORMAL FINDINGS: ICD-10-CM

## 2024-01-31 DIAGNOSIS — E11.9 TYPE 2 DIABETES MELLITUS WITHOUT COMPLICATIONS: ICD-10-CM

## 2024-01-31 DIAGNOSIS — R73.03 PREDIABETES.: ICD-10-CM

## 2024-01-31 PROCEDURE — 99214 OFFICE O/P EST MOD 30 MIN: CPT | Mod: GC

## 2024-01-31 PROCEDURE — 99214 OFFICE O/P EST MOD 30 MIN: CPT

## 2024-01-31 NOTE — ASSESSMENT
[FreeTextEntry1] : # Autism # Mood disorder - Worsening behavioral disturbances - on Divalproex, Lorazepam, Nuedexta, Olanzapine, Topiramate, Propranolol - patient might benefit from  follow up for behavioral disturbances  #hx of kidney stones #CKD  - Cr 1.8 in Dec 2023 stable  - RBUS 7/23 shows non obstructive 4mm right renal stone and left lower renal pole cyst ~1.4 cm(no complex features described on US) - Encouraged adequate hydration ~60 ounces. Educated on low salt diet, low glycemic index diet, and medication compliance - Followed up with urology, no intervention and fu prn  - Follows up with nephrology   # Hypothyroidism - TSH 3.14 in Dec 2023 - c/w Levothyroxine 75 mcg  # Vitamin D deficiency - Level 19.4 in Oct 2023  - cw Vit D supplementation   # Constipation - c/w Colace and Miralax - C/W high fiber diet  #DM-2 - HbA1c 6.2 12/2023 - c/w Januvia, Pioglitazone - Farxiga 10 mg started by Endo - seen by ophthalmology for retinopathy eval (1/2023), recommended annual evaluation  # HLD - lipid profile 12/2024 wnl - c/w Pravastatin 40 mg  #Prolactinoma - likely medication induced - seen by OBGYN and Endocrinology- likely due to being on multiple psych meds, no further workup advised  # HCM - Covid vaccine: pt has received 2 shots of Covid vaccine + Covid booster in 1st week of Jan 2022 - Flu vaccine:  Given 12/11/23  - Gyn appointment 5/18/23; uncooperative for annual exam, recommended that benefit of performing yearly exams is outweighed by patient discomfort, only bring patient back if any issues arise - medication renewed - RTC 3 months. Encouraged staff to follow up with nephrology, endocrinology. ophthalmology. neurology

## 2024-01-31 NOTE — PHYSICAL EXAM
[No Acute Distress] : no acute distress [Well Nourished] : well nourished [Normal Sclera/Conjunctiva] : normal sclera/conjunctiva [PERRL] : pupils equal round and reactive to light [EOMI] : extraocular movements intact [Normal Outer Ear/Nose] : the outer ears and nose were normal in appearance [No JVD] : no jugular venous distention [No Lymphadenopathy] : no lymphadenopathy [Supple] : supple [No Respiratory Distress] : no respiratory distress  [No Accessory Muscle Use] : no accessory muscle use [Clear to Auscultation] : lungs were clear to auscultation bilaterally [Normal Rate] : normal rate  [Regular Rhythm] : with a regular rhythm [Normal S1, S2] : normal S1 and S2 [No Varicosities] : no varicosities [No Edema] : there was no peripheral edema [No Extremity Clubbing/Cyanosis] : no extremity clubbing/cyanosis [Soft] : abdomen soft [Non Tender] : non-tender [Normal Bowel Sounds] : normal bowel sounds [Normal Posterior Cervical Nodes] : no posterior cervical lymphadenopathy [Normal Anterior Cervical Nodes] : no anterior cervical lymphadenopathy [No CVA Tenderness] : no CVA  tenderness [No Spinal Tenderness] : no spinal tenderness [No Joint Swelling] : no joint swelling [No Rash] : no rash [No Focal Deficits] : no focal deficits

## 2024-01-31 NOTE — HISTORY OF PRESENT ILLNESS
[FreeTextEntry1] : FU  [de-identified] : This is a 34-year-old female with autism, T2DM, HLD, hypothyroidism, CKD 3 here for follow-up.  Accompanied by staff Crystal. Patient is nonverbal at baseline. Per staff no new issues or complaints. States here for routine follow up.

## 2024-01-31 NOTE — END OF VISIT
[] : Resident [FreeTextEntry3] : Pt. here for follow up.  Had flu vaccine 12/11/23.  Had blood work done by endo 12/19/23 LDL 89, triglyceride 117, A1C 6.2.  Seen Gyn 5/18/23, benefit of performing yearly exams is outweigh by patient discomfort; follow up prn.  Medication renewed.  Follow neuro 5/1/24 for pseudobulbar affect, endo 5/13/24 for NIDDM/hypothyroidism, renal 3/12/24 for CKD.  RTC 3 months.

## 2024-02-26 LAB
24R-OH-CALCIDIOL SERPL-MCNC: 25.9 PG/ML
ACTH SER-ACNC: 63.2 PG/ML
ALBUMIN SERPL ELPH-MCNC: 4.3 G/DL
ALDOSTERONE SERUM: 33.4 NG/DL
ALP BLD-CCNC: 71 U/L
ALT SERPL-CCNC: 18 U/L
ANION GAP SERPL CALC-SCNC: 12 MMOL/L
APPEARANCE: CLEAR
APTT BLD: 47.4 SEC
AST SERPL-CCNC: 25 U/L
BILIRUB SERPL-MCNC: 0.3 MG/DL
BILIRUBIN URINE: NEGATIVE
BLOOD URINE: NEGATIVE
BUN SERPL-MCNC: 26 MG/DL
CALCIUM SERPL-MCNC: 9.8 MG/DL
CHLORIDE SERPL-SCNC: 112 MMOL/L
CHOLEST SERPL-MCNC: 161 MG/DL
CO2 SERPL-SCNC: 23 MMOL/L
COLOR: YELLOW
CORTIS 24H UR-MCNC: 24 H
CORTIS 24H UR-MRATE: 3.4 MCG/24 H
CORTIS SERPL-MCNC: 19.2 UG/DL
CREAT 24H UR-MCNC: 0.5 G/24 HR
CREAT ?TM UR-MCNC: 17 MG/DL
CREAT ?TM UR-MCNC: 19 MG/DL
CREAT SERPL-MCNC: 1.8 MG/DL
CREAT SPEC-SCNC: 17 MG/DL
DHEA-S SERPL-MCNC: 199 UG/DL
DOPAMINE UR-MCNC: 24 UG/L
DOPAMINE, UR, 24HR: 64 UG/24 HR
EGFR: 37 ML/MIN/1.73M2
EPINEPH UR-MCNC: <3 UG/L
EPINEPHRINE, U, 24HR: <8 UG/24 HR
ESTIMATED AVERAGE GLUCOSE: 131 MG/DL
ESTRADIOL SERPL-MCNC: 57 PG/ML
FSH SERPL-MCNC: 8.6 IU/L
GLUCOSE QUALITATIVE U: 500 MG/DL
GLUCOSE SERPL-MCNC: 162 MG/DL
HBA1C MFR BLD HPLC: 6.2 %
HCT VFR BLD CALC: 48.5 %
HDLC SERPL-MCNC: 49 MG/DL
HGB BLD-MCNC: 14.9 G/DL
INR PPP: 0.97 RATIO
KETONES URINE: NEGATIVE MG/DL
LDLC SERPL CALC-MCNC: 89 MG/DL
LEUKOCYTE ESTERASE URINE: NEGATIVE
LH SERPL-ACNC: 8.8 IU/L
MCHC RBC-ENTMCNC: 30.7 G/DL
MCHC RBC-ENTMCNC: 31.5 PG
MCV RBC AUTO: 102.5 FL
METANEPH 24H UR-MRATE: 53 UG/24 HR
METANEPHS 24H UR-MCNC: 20 UG/L
MICROALBUMIN 24H UR DL<=1MG/L-MCNC: <1.2 MG/DL
MICROALBUMIN/CREAT 24H UR-RTO: NORMAL MG/G
NITRITE URINE: NEGATIVE
NONHDLC SERPL-MCNC: 112 MG/DL
NOREPINEPH UR-MCNC: <15 UG/L
NOREPINEPHRINE,U,24H: <40 UG/24 HR
NORMETANEPHRINE 24 HR URINE: 167 UG/24 HR
NORMETANEPHRINE 24H UR-MCNC: 63 UG/L
PH URINE: 6.5
PLATELET # BLD AUTO: 162 K/UL
PMV BLD: 11 FL
POTASSIUM SERPL-SCNC: 4.6 MMOL/L
PROLACTIN SERPL-MCNC: 71.4 NG/ML
PROT ?TM UR-MCNC: 24 HR
PROT SERPL-MCNC: 8 G/DL
PROTEIN URINE: NEGATIVE MG/DL
PT BLD: 11.1 SEC
RBC # BLD: 4.73 M/UL
RBC # FLD: 14.2 %
SHBG SERPL-SCNC: 178 NMOL/L
SODIUM SERPL-SCNC: 147 MMOL/L
SPECIFIC GRAVITY URINE: 1.01
SPECIMEN VOL 24H UR: 2650 ML
SPECIMEN VOL 24H UR: 2650 ML
T3 SERPL-MCNC: 112 NG/DL
T4 FREE SERPL-MCNC: 1.2 NG/DL
TESTOST FREE SERPL-MCNC: 6 PG/ML
TESTOST SERPL-MCNC: 62.7 NG/DL
THYROGLOB AB SERPL-ACNC: <20 IU/ML
THYROPEROXIDASE AB SERPL IA-ACNC: 42.9 IU/ML
TRIGL SERPL-MCNC: 117 MG/DL
TSH SERPL-ACNC: 3.14 UIU/ML
UROBILINOGEN URINE: 0.2 MG/DL
VIT B12 SERPL-MCNC: 709 PG/ML
VMA/G CREATININE: <2.9 MG/G CREAT
WBC # FLD AUTO: 4.69 K/UL

## 2024-02-27 ENCOUNTER — NON-APPOINTMENT (OUTPATIENT)
Age: 34
End: 2024-02-27

## 2024-03-11 RX ORDER — DOCUSATE SODIUM 100 MG/1
100 CAPSULE ORAL
Qty: 60 | Refills: 3 | Status: ACTIVE | COMMUNITY
Start: 1900-01-01 | End: 1900-01-01

## 2024-03-12 ENCOUNTER — APPOINTMENT (OUTPATIENT)
Dept: NEPHROLOGY | Facility: CLINIC | Age: 34
End: 2024-03-12
Payer: COMMERCIAL

## 2024-03-12 ENCOUNTER — OUTPATIENT (OUTPATIENT)
Dept: OUTPATIENT SERVICES | Facility: HOSPITAL | Age: 34
LOS: 1 days | End: 2024-03-12
Payer: COMMERCIAL

## 2024-03-12 DIAGNOSIS — N18.32 CHRONIC KIDNEY DISEASE, STAGE 3B: ICD-10-CM

## 2024-03-12 DIAGNOSIS — Z00.00 ENCOUNTER FOR GENERAL ADULT MEDICAL EXAMINATION WITHOUT ABNORMAL FINDINGS: ICD-10-CM

## 2024-03-12 PROCEDURE — 99214 OFFICE O/P EST MOD 30 MIN: CPT

## 2024-03-12 NOTE — ASSESSMENT
[FreeTextEntry1] : #) CKD stage G3bA1  most likely due to DM and possible ongoing scarring from nephrolithiasis - RBUS 7/23 shows non obstructive 4mm right renal stone and left lower renal pole cyst ~1.4 cm(no complex features described on US) - encouraged adequate hydration ~60 ounces - educated on low salt diet, low glycemic index diet, and medication compliance - RTC 6 months, repeat labs, UA, Renal US, if stones remain will send to urology for evaluation

## 2024-03-12 NOTE — HISTORY OF PRESENT ILLNESS
[FreeTextEntry1] : 33 year old female with autism, T2DM, HLD, hypothyroidism seen in follow up for CKD.  SCr stable at 1.8, corresponding to eGFR level in the CKD stage 3b range.  No proteinuria noted on last test.  Prior Renal U/S noted multiple kidney stones, and f/u U/S has not yet been performed.  Due to baseline MR she is unable to participate in exam or provide ROS at this time.

## 2024-03-13 DIAGNOSIS — N18.32 CHRONIC KIDNEY DISEASE, STAGE 3B: ICD-10-CM

## 2024-03-13 DIAGNOSIS — N20.0 CALCULUS OF KIDNEY: ICD-10-CM

## 2024-03-13 DIAGNOSIS — E78.00 PURE HYPERCHOLESTEROLEMIA, UNSPECIFIED: ICD-10-CM

## 2024-03-13 DIAGNOSIS — R32 UNSPECIFIED URINARY INCONTINENCE: ICD-10-CM

## 2024-03-13 DIAGNOSIS — E11.9 TYPE 2 DIABETES MELLITUS WITHOUT COMPLICATIONS: ICD-10-CM

## 2024-03-14 ENCOUNTER — OUTPATIENT (OUTPATIENT)
Dept: OUTPATIENT SERVICES | Facility: HOSPITAL | Age: 34
LOS: 1 days | End: 2024-03-14
Payer: COMMERCIAL

## 2024-03-14 VITALS
RESPIRATION RATE: 20 BRPM | TEMPERATURE: 98 F | SYSTOLIC BLOOD PRESSURE: 130 MMHG | WEIGHT: 160.94 LBS | HEIGHT: 62.99 IN | DIASTOLIC BLOOD PRESSURE: 83 MMHG | OXYGEN SATURATION: 94 % | HEART RATE: 77 BPM

## 2024-03-14 DIAGNOSIS — Z01.818 ENCOUNTER FOR OTHER PREPROCEDURAL EXAMINATION: ICD-10-CM

## 2024-03-14 DIAGNOSIS — N91.2 AMENORRHEA, UNSPECIFIED: ICD-10-CM

## 2024-03-14 PROCEDURE — 99214 OFFICE O/P EST MOD 30 MIN: CPT | Mod: 25

## 2024-03-14 NOTE — H&P PST ADULT - OTHER CARE PROVIDERS
PCP Rico; Dental Kamini; Psych Beal; Optho Rojas; Gyn Athanasteve; Endo Madhav; Pod Sal; Neuro James

## 2024-03-14 NOTE — H&P PST ADULT - ADDITIONAL PE
Patient is severely autistic, patient non compliant with exam, kicking and screaming throughout visit. Patient is autistic, patient non compliant with exam, combative kicking and screaming throughout visit.

## 2024-03-14 NOTE — H&P PST ADULT - NSICDXPASTMEDICALHX_GEN_ALL_CORE_FT
PAST MEDICAL HISTORY:  Amenorrhea     Autism     Bipolar mood disorder     Chronic kidney disease     Diabetes     GERD (gastroesophageal reflux disease)     H/O closed fracture of nasal bones     H/O constipation     H/O hiatal hernia     High cholesterol     History of constipation     Hypertension     Hypothyroid     PBA (pseudobulbar affect)

## 2024-03-14 NOTE — H&P PST ADULT - NSICDXFAMILYHX_GEN_ALL_CORE_FT
FAMILY HISTORY:  Father  Still living? Unknown  Family history of Raynaud's syndrome, Age at diagnosis: Age Unknown  FH: COPD (chronic obstructive pulmonary disease), Age at diagnosis: Age Unknown    Mother  Still living? Unknown  FH: COPD (chronic obstructive pulmonary disease), Age at diagnosis: Age Unknown  FH: HTN (hypertension), Age at diagnosis: Age Unknown    Grandparent  Still living? Unknown  Family history of von Willebrand disease, Age at diagnosis: Age Unknown  FH: COPD (chronic obstructive pulmonary disease), Age at diagnosis: Age Unknown

## 2024-03-14 NOTE — H&P PST ADULT - NSANTHOSAYNRD_GEN_A_CORE
No. CHRIS screening performed.  STOP BANG Legend: 0-2 = LOW Risk; 3-4 = INTERMEDIATE Risk; 5-8 = HIGH Risk Staff with patient unable to provide information/No. CHRIS screening performed.  STOP BANG Legend: 0-2 = LOW Risk; 3-4 = INTERMEDIATE Risk; 5-8 = HIGH Risk

## 2024-03-14 NOTE — H&P PST ADULT - REASON FOR ADMISSION
Case Type: OP Non-block TimeSuite: RadiologyProceduralist: Tung Yee  Confirmed Surgery DateTime: 03- - 8:00PAST DateTime: 03- - 11:30Procedure: SEDATION MRI PITUITARY WWO  ERP?: UnavailableLaterality: N/ALength of Procedure: 60 Minutes  Anesthesia Type: General

## 2024-03-14 NOTE — H&P PST ADULT - HISTORY OF PRESENT ILLNESS
34y Female presents today for presurgical testing for SEDATION MRI PITUITARY WWO. Per Gyn note dated 1/25/24 "Pt was brought to [facility] but the patient and aide were not able to provide any history. Pt is nonverbal.  ?  Reason for visit is to review abnormal labs that were sent by endocrinologist and to discuss why patient has not had menses in several months, to discuss potential evaluation for PCOS. Pt was last seen by me in May 2023, at which time she did skip a period for the first time, but then period after came as normal.  ?  Labs are significant for elevated prolactin to 71, as well as elevated lipids, A1c, cortisol  ?  Impression:  amenorrhea  differential diagnosis includes hyperprolactinemia secondary to prolactinoma vs medication  ?  I discussed with Rosalva that the psyciatric meds that the patient is on can cause elevated prolactin, which can then cause amenorrhea".  Patient presented with 2 staff members of [facility] and was noted to scream while waiting in the waiting room and throughout her assessment. She was non compliant with a majority of her exam and violently flailed out with her arms and legs at times during the exam. While in the patient waiting room patient was found to bang head into wall behind where she was sitting despite arriving with 2 escorts.   Patient denies any CP, palpitations, SOB, cough, or dysuria. No recent URI or UTI.  Stated exercise tolerance is FOS ambulates independently    Patient denies any recent personal exposure to COVID19. Denies any sick contacts. Patient denies travel within the past 30 days. Patient was instructed to quarantine until after procedure.    Anesthesia Alert  NO--Difficult Airway - Class III? - difficult to assess re: patient was screaming throughout visit and was resistant to exam.  NO--History of neck surgery or radiation  NO--Limited ROM of neck  NO--History of Malignant hyperthermia  NO--Personal or family history of Pseudocholinesterase deficiency.  NO--Prior Anesthesia Complication  NO--Latex Allergy  NO--Loose teeth  NO--History of Rheumatoid Arthritis  NO--CHRIS  NO--Bleeding risk  NO--Other_____      Patient states that this is their complete medical history and list of medications.  Patient verbalized understanding of instructions given during this visit and was given the opportunity to ask questions and have them answered. They were instructed to follow up with their surgeon/surgeon's office with any questions regarding their procedure.   34y Female presents today for presurgical testing for SEDATION MRI PITUITARY WWO. Per Gyn note dated 1/25/24 "Pt was brought to [facility] but the patient and aide were not able to provide any history. Pt is nonverbal.  ?  Reason for visit is to review abnormal labs that were sent by endocrinologist and to discuss why patient has not had menses in several months, to discuss potential evaluation for PCOS. Pt was last seen by me in May 2023, at which time she did skip a period for the first time, but then period after came as normal.  ?  Labs are significant for elevated prolactin to 71, as well as elevated lipids, A1c, cortisol  ?  Impression:  amenorrhea  differential diagnosis includes hyperprolactinemia secondary to prolactinoma vs medication  ?  I discussed with Rosalva that the psyciatric meds that the patient is on can cause elevated prolactin, which can then cause amenorrhea".  Patient presented with 2 staff members of [facility] and was noted to scream while waiting in the waiting room and throughout her assessment. She was non compliant with a majority of her exam and violently flailed out with her arms and legs at times during the exam. While in the patient waiting room patient was found to bang head into wall behind where she was sitting despite arriving with 2 escorts.   Patient denies any CP, palpitations, SOB, cough, or dysuria. No recent URI or UTI.  Stated exercise tolerance is FOS ambulates independently    Patient denies any recent personal exposure to COVID19. Denies any sick contacts. Patient denies travel within the past 30 days. Patient was instructed to quarantine until after procedure.    Anesthesia Alert  NO--Difficult Airway - unable to assess re: patient was combative and screaming throughout visit and was resistant to exam.  NO--History of neck surgery or radiation  NO--Limited ROM of neck  NO--History of Malignant hyperthermia  NO--Personal or family history of Pseudocholinesterase deficiency.  NO--Prior Anesthesia Complication  NO--Latex Allergy  NO--Loose teeth  NO--History of Rheumatoid Arthritis  NO--CHRIS  NO--Bleeding risk  NO--Other_____    Revised Cardiac Risk Index for Pre-Operative Risk from [email or web address]  on 3/14/2024  ** All calculations should be rechecked by clinician prior to use **    RESULT SUMMARY:  0 points  Class I Risk    3.9 %  30-day risk of death, MI, or cardiac arrest    From [place] 2017. These numbers are higher than those from the original study (Martin 1999). See Evidence for details.      INPUTS:  Elevated-risk surgery —> 0 = No  History of ischemic heart disease —> 0 = No  History of congestive heart failure —> 0 = No  History of cerebrovascular disease —> 0 = No  Pre-operative treatment with insulin —> 0 = No  Pre-operative creatinine >2 mg/dL / 176.8 µmol/L —> 0 = No    Unable to assess RCRI re: staff from [facility] accompanying patient are unable to answer questions  Patient states that this is their complete medical history and list of medications.  Patient verbalized understanding of instructions given during this visit and was given the opportunity to ask questions and have them answered. They were instructed to follow up with their surgeon/surgeon's office with any questions regarding their procedure.   34y Female presents today for presurgical testing for SEDATION MRI PITUITARY WWO. Per Gyn note dated 1/25/24 "Pt was brought to [facility] but the patient and aide were not able to provide any history. Pt is nonverbal.  ?  Reason for visit is to review abnormal labs that were sent by endocrinologist and to discuss why patient has not had menses in several months, to discuss potential evaluation for PCOS. Pt was last seen by me in May 2023, at which time she did skip a period for the first time, but then period after came as normal.  ?  Labs are significant for elevated prolactin to 71, as well as elevated lipids, A1c, cortisol  ?  Impression:  amenorrhea  differential diagnosis includes hyperprolactinemia secondary to prolactinoma vs medication  ?  I discussed with Rosalva that the psyciatric meds that the patient is on can cause elevated prolactin, which can then cause amenorrhea".  Patient presented with 2 staff members of [facility] and was noted to scream while waiting in the waiting room and throughout her assessment. She was non compliant with a majority of her exam and violently flailed out with her arms and legs at times during the exam. While in the patient waiting room patient was found to bang head into wall behind where she was sitting despite arriving with 2 escorts.   Patient denies any CP, palpitations, SOB, cough, or dysuria. No recent URI or UTI.  Stated exercise tolerance is FOS ambulates independently    Patient denies any recent personal exposure to COVID19. Denies any sick contacts. Patient denies travel within the past 30 days. Patient was instructed to quarantine until after procedure.    Anesthesia Alert  NO--Difficult Airway - unable to assess re: patient was combative and screaming throughout visit and was resistant to exam.  NO--History of neck surgery or radiation  NO--Limited ROM of neck  NO--History of Malignant hyperthermia  NO--Personal or family history of Pseudocholinesterase deficiency.  NO--Prior Anesthesia Complication  NO--Latex Allergy  NO--Loose teeth  NO--History of Rheumatoid Arthritis  NO--CHRIS  NO--Bleeding risk  NO--Other_____    Diabetes education given during PAST visit.    Revised Cardiac Risk Index for Pre-Operative Risk from [email or web address]  on 3/14/2024  ** All calculations should be rechecked by clinician prior to use **    RESULT SUMMARY:  0 points  Class I Risk    3.9 %  30-day risk of death, MI, or cardiac arrest    From [employer] 2017. These numbers are higher than those from the original study (Martin 1999). See Evidence for details.      INPUTS:  Elevated-risk surgery —> 0 = No  History of ischemic heart disease —> 0 = No  History of congestive heart failure —> 0 = No  History of cerebrovascular disease —> 0 = No  Pre-operative treatment with insulin —> 0 = No  Pre-operative creatinine >2 mg/dL / 176.8 µmol/L —> 0 = No    Unable to assess RCRI re: staff from [facility] accompanying patient are unable to answer questions  Patient states that this is their complete medical history and list of medications.  Patient verbalized understanding of instructions given during this visit and was given the opportunity to ask questions and have them answered. They were instructed to follow up with their surgeon/surgeon's office with any questions regarding their procedure.

## 2024-03-15 DIAGNOSIS — N91.2 AMENORRHEA, UNSPECIFIED: ICD-10-CM

## 2024-03-15 DIAGNOSIS — Z01.818 ENCOUNTER FOR OTHER PREPROCEDURAL EXAMINATION: ICD-10-CM

## 2024-03-19 ENCOUNTER — OUTPATIENT (OUTPATIENT)
Dept: OUTPATIENT SERVICES | Facility: HOSPITAL | Age: 34
LOS: 1 days | End: 2024-03-19
Payer: COMMERCIAL

## 2024-03-19 DIAGNOSIS — Z00.8 ENCOUNTER FOR OTHER GENERAL EXAMINATION: ICD-10-CM

## 2024-03-19 DIAGNOSIS — N18.9 CHRONIC KIDNEY DISEASE, UNSPECIFIED: ICD-10-CM

## 2024-03-19 PROCEDURE — 76770 US EXAM ABDO BACK WALL COMP: CPT | Mod: 26

## 2024-03-19 PROCEDURE — 76770 US EXAM ABDO BACK WALL COMP: CPT

## 2024-03-20 DIAGNOSIS — N18.9 CHRONIC KIDNEY DISEASE, UNSPECIFIED: ICD-10-CM

## 2024-04-12 ENCOUNTER — INPATIENT (INPATIENT)
Facility: HOSPITAL | Age: 34
LOS: 3 days | Discharge: HOME | DRG: 683 | End: 2024-04-16
Attending: STUDENT IN AN ORGANIZED HEALTH CARE EDUCATION/TRAINING PROGRAM | Admitting: INTERNAL MEDICINE
Payer: COMMERCIAL

## 2024-04-12 VITALS — WEIGHT: 153 LBS | HEIGHT: 63 IN

## 2024-04-12 DIAGNOSIS — E87.1 HYPO-OSMOLALITY AND HYPONATREMIA: ICD-10-CM

## 2024-04-12 LAB
ALBUMIN SERPL ELPH-MCNC: 3.8 G/DL — SIGNIFICANT CHANGE UP (ref 3.5–5.2)
ALP SERPL-CCNC: 82 U/L — SIGNIFICANT CHANGE UP (ref 30–115)
ALT FLD-CCNC: 19 U/L — SIGNIFICANT CHANGE UP (ref 0–41)
ANION GAP SERPL CALC-SCNC: 12 MMOL/L — SIGNIFICANT CHANGE UP (ref 7–14)
APPEARANCE UR: CLEAR — SIGNIFICANT CHANGE UP
AST SERPL-CCNC: 35 U/L — SIGNIFICANT CHANGE UP (ref 0–41)
BASOPHILS # BLD AUTO: 0.02 K/UL — SIGNIFICANT CHANGE UP (ref 0–0.2)
BASOPHILS NFR BLD AUTO: 0.3 % — SIGNIFICANT CHANGE UP (ref 0–1)
BILIRUB SERPL-MCNC: 0.5 MG/DL — SIGNIFICANT CHANGE UP (ref 0.2–1.2)
BILIRUB UR-MCNC: NEGATIVE — SIGNIFICANT CHANGE UP
BUN SERPL-MCNC: 28 MG/DL — HIGH (ref 10–20)
CALCIUM SERPL-MCNC: 8.8 MG/DL — SIGNIFICANT CHANGE UP (ref 8.4–10.5)
CHLORIDE SERPL-SCNC: 94 MMOL/L — LOW (ref 98–110)
CO2 SERPL-SCNC: 21 MMOL/L — SIGNIFICANT CHANGE UP (ref 17–32)
COLOR SPEC: YELLOW — SIGNIFICANT CHANGE UP
CREAT SERPL-MCNC: 2.1 MG/DL — HIGH (ref 0.7–1.5)
DIFF PNL FLD: NEGATIVE — SIGNIFICANT CHANGE UP
EGFR: 31 ML/MIN/1.73M2 — LOW
EOSINOPHIL # BLD AUTO: 0.06 K/UL — SIGNIFICANT CHANGE UP (ref 0–0.7)
EOSINOPHIL NFR BLD AUTO: 1 % — SIGNIFICANT CHANGE UP (ref 0–8)
GLUCOSE SERPL-MCNC: 163 MG/DL — HIGH (ref 70–99)
GLUCOSE UR QL: 250 MG/DL
HCG SERPL QL: NEGATIVE — SIGNIFICANT CHANGE UP
HCT VFR BLD CALC: 40.4 % — SIGNIFICANT CHANGE UP (ref 37–47)
HGB BLD-MCNC: 13.8 G/DL — SIGNIFICANT CHANGE UP (ref 12–16)
IMM GRANULOCYTES NFR BLD AUTO: 1.2 % — HIGH (ref 0.1–0.3)
KETONES UR-MCNC: NEGATIVE MG/DL — SIGNIFICANT CHANGE UP
LACTATE SERPL-SCNC: 0.7 MMOL/L — SIGNIFICANT CHANGE UP (ref 0.7–2)
LEUKOCYTE ESTERASE UR-ACNC: NEGATIVE — SIGNIFICANT CHANGE UP
LIDOCAIN IGE QN: 84 U/L — HIGH (ref 7–60)
LYMPHOCYTES # BLD AUTO: 2.46 K/UL — SIGNIFICANT CHANGE UP (ref 1.2–3.4)
LYMPHOCYTES # BLD AUTO: 42.7 % — SIGNIFICANT CHANGE UP (ref 20.5–51.1)
MAGNESIUM SERPL-MCNC: 2 MG/DL — SIGNIFICANT CHANGE UP (ref 1.8–2.4)
MCHC RBC-ENTMCNC: 32.6 PG — HIGH (ref 27–31)
MCHC RBC-ENTMCNC: 34.2 G/DL — SIGNIFICANT CHANGE UP (ref 32–37)
MCV RBC AUTO: 95.5 FL — SIGNIFICANT CHANGE UP (ref 81–99)
MONOCYTES # BLD AUTO: 0.95 K/UL — HIGH (ref 0.1–0.6)
MONOCYTES NFR BLD AUTO: 16.5 % — HIGH (ref 1.7–9.3)
NEUTROPHILS # BLD AUTO: 2.2 K/UL — SIGNIFICANT CHANGE UP (ref 1.4–6.5)
NEUTROPHILS NFR BLD AUTO: 38.3 % — LOW (ref 42.2–75.2)
NITRITE UR-MCNC: NEGATIVE — SIGNIFICANT CHANGE UP
NRBC # BLD: 0 /100 WBCS — SIGNIFICANT CHANGE UP (ref 0–0)
PH UR: 7 — SIGNIFICANT CHANGE UP (ref 5–8)
PLATELET # BLD AUTO: 82 K/UL — LOW (ref 130–400)
PMV BLD: 9.6 FL — SIGNIFICANT CHANGE UP (ref 7.4–10.4)
POTASSIUM SERPL-MCNC: 3.8 MMOL/L — SIGNIFICANT CHANGE UP (ref 3.5–5)
POTASSIUM SERPL-SCNC: 3.8 MMOL/L — SIGNIFICANT CHANGE UP (ref 3.5–5)
PROT SERPL-MCNC: 6.8 G/DL — SIGNIFICANT CHANGE UP (ref 6–8)
PROT UR-MCNC: NEGATIVE MG/DL — SIGNIFICANT CHANGE UP
RBC # BLD: 4.23 M/UL — SIGNIFICANT CHANGE UP (ref 4.2–5.4)
RBC # FLD: 12.3 % — SIGNIFICANT CHANGE UP (ref 11.5–14.5)
SODIUM SERPL-SCNC: 127 MMOL/L — LOW (ref 135–146)
SP GR SPEC: <1.005 — LOW (ref 1–1.03)
UROBILINOGEN FLD QL: 0.2 MG/DL — SIGNIFICANT CHANGE UP (ref 0.2–1)
VALPROATE SERPL-MCNC: 82 UG/ML — SIGNIFICANT CHANGE UP (ref 50–100)
WBC # BLD: 5.76 K/UL — SIGNIFICANT CHANGE UP (ref 4.8–10.8)
WBC # FLD AUTO: 5.76 K/UL — SIGNIFICANT CHANGE UP (ref 4.8–10.8)

## 2024-04-12 PROCEDURE — 73630 X-RAY EXAM OF FOOT: CPT | Mod: LT

## 2024-04-12 PROCEDURE — 85025 COMPLETE CBC W/AUTO DIFF WBC: CPT

## 2024-04-12 PROCEDURE — 82962 GLUCOSE BLOOD TEST: CPT

## 2024-04-12 PROCEDURE — 36415 COLL VENOUS BLD VENIPUNCTURE: CPT

## 2024-04-12 PROCEDURE — 84443 ASSAY THYROID STIM HORMONE: CPT

## 2024-04-12 PROCEDURE — 83036 HEMOGLOBIN GLYCOSYLATED A1C: CPT

## 2024-04-12 PROCEDURE — 99222 1ST HOSP IP/OBS MODERATE 55: CPT

## 2024-04-12 PROCEDURE — 80048 BASIC METABOLIC PNL TOTAL CA: CPT

## 2024-04-12 PROCEDURE — 80053 COMPREHEN METABOLIC PANEL: CPT

## 2024-04-12 PROCEDURE — 74176 CT ABD & PELVIS W/O CONTRAST: CPT | Mod: 26,MC

## 2024-04-12 PROCEDURE — 99285 EMERGENCY DEPT VISIT HI MDM: CPT

## 2024-04-12 RX ORDER — GLUCAGON INJECTION, SOLUTION 0.5 MG/.1ML
1 INJECTION, SOLUTION SUBCUTANEOUS ONCE
Refills: 0 | Status: DISCONTINUED | OUTPATIENT
Start: 2024-04-12 | End: 2024-04-16

## 2024-04-12 RX ORDER — POLYETHYLENE GLYCOL 3350 17 G/17G
17 POWDER, FOR SOLUTION ORAL
Refills: 0 | Status: DISCONTINUED | OUTPATIENT
Start: 2024-04-12 | End: 2024-04-16

## 2024-04-12 RX ORDER — DIVALPROEX SODIUM 500 MG/1
500 TABLET, DELAYED RELEASE ORAL EVERY 12 HOURS
Refills: 0 | Status: DISCONTINUED | OUTPATIENT
Start: 2024-04-12 | End: 2024-04-16

## 2024-04-12 RX ORDER — OLANZAPINE 15 MG/1
5 TABLET, FILM COATED ORAL
Refills: 0 | Status: DISCONTINUED | OUTPATIENT
Start: 2024-04-12 | End: 2024-04-16

## 2024-04-12 RX ORDER — SODIUM CHLORIDE 9 MG/ML
1000 INJECTION, SOLUTION INTRAVENOUS ONCE
Refills: 0 | Status: COMPLETED | OUTPATIENT
Start: 2024-04-12 | End: 2024-04-12

## 2024-04-12 RX ORDER — DEXTROSE 50 % IN WATER 50 %
12.5 SYRINGE (ML) INTRAVENOUS ONCE
Refills: 0 | Status: DISCONTINUED | OUTPATIENT
Start: 2024-04-12 | End: 2024-04-16

## 2024-04-12 RX ORDER — DEXTROSE 50 % IN WATER 50 %
25 SYRINGE (ML) INTRAVENOUS ONCE
Refills: 0 | Status: DISCONTINUED | OUTPATIENT
Start: 2024-04-12 | End: 2024-04-16

## 2024-04-12 RX ORDER — TOPIRAMATE 25 MG
100 TABLET ORAL EVERY 12 HOURS
Refills: 0 | Status: DISCONTINUED | OUTPATIENT
Start: 2024-04-12 | End: 2024-04-16

## 2024-04-12 RX ORDER — SODIUM CHLORIDE 9 MG/ML
1000 INJECTION INTRAMUSCULAR; INTRAVENOUS; SUBCUTANEOUS
Refills: 0 | Status: DISCONTINUED | OUTPATIENT
Start: 2024-04-12 | End: 2024-04-14

## 2024-04-12 RX ORDER — DEXTROSE 50 % IN WATER 50 %
15 SYRINGE (ML) INTRAVENOUS ONCE
Refills: 0 | Status: DISCONTINUED | OUTPATIENT
Start: 2024-04-12 | End: 2024-04-16

## 2024-04-12 RX ORDER — DAPAGLIFLOZIN 10 MG/1
10 TABLET, FILM COATED ORAL EVERY 24 HOURS
Refills: 0 | Status: DISCONTINUED | OUTPATIENT
Start: 2024-04-12 | End: 2024-04-16

## 2024-04-12 RX ORDER — DEXTROMETHORPHAN HYDROBROMIDE AND QUINIDINE SULFATE 20; 10 MG/1; MG/1
1 CAPSULE, GELATIN COATED ORAL
Qty: 0 | Refills: 0 | DISCHARGE

## 2024-04-12 RX ORDER — DEXTROSE 10 % IN WATER 10 %
125 INTRAVENOUS SOLUTION INTRAVENOUS ONCE
Refills: 0 | Status: DISCONTINUED | OUTPATIENT
Start: 2024-04-12 | End: 2024-04-16

## 2024-04-12 RX ORDER — INSULIN LISPRO 100/ML
VIAL (ML) SUBCUTANEOUS
Refills: 0 | Status: DISCONTINUED | OUTPATIENT
Start: 2024-04-12 | End: 2024-04-16

## 2024-04-12 RX ORDER — SODIUM CHLORIDE 9 MG/ML
1000 INJECTION, SOLUTION INTRAVENOUS
Refills: 0 | Status: DISCONTINUED | OUTPATIENT
Start: 2024-04-12 | End: 2024-04-12

## 2024-04-12 RX ORDER — SODIUM CHLORIDE 9 MG/ML
1000 INJECTION, SOLUTION INTRAVENOUS
Refills: 0 | Status: DISCONTINUED | OUTPATIENT
Start: 2024-04-12 | End: 2024-04-16

## 2024-04-12 RX ORDER — CHOLECALCIFEROL (VITAMIN D3) 125 MCG
5000 CAPSULE ORAL DAILY
Refills: 0 | Status: DISCONTINUED | OUTPATIENT
Start: 2024-04-12 | End: 2024-04-16

## 2024-04-12 RX ORDER — ONDANSETRON 8 MG/1
4 TABLET, FILM COATED ORAL EVERY 8 HOURS
Refills: 0 | Status: DISCONTINUED | OUTPATIENT
Start: 2024-04-12 | End: 2024-04-16

## 2024-04-12 RX ORDER — SODIUM CHLORIDE 9 MG/ML
1000 INJECTION INTRAMUSCULAR; INTRAVENOUS; SUBCUTANEOUS ONCE
Refills: 0 | Status: COMPLETED | OUTPATIENT
Start: 2024-04-12 | End: 2024-04-12

## 2024-04-12 RX ORDER — SOD,AMMONIUM,POTASSIUM LACTATE
1 CREAM (GRAM) TOPICAL EVERY 12 HOURS
Refills: 0 | Status: DISCONTINUED | OUTPATIENT
Start: 2024-04-12 | End: 2024-04-16

## 2024-04-12 RX ORDER — LEVOTHYROXINE SODIUM 125 MCG
1 TABLET ORAL
Refills: 0 | DISCHARGE

## 2024-04-12 RX ORDER — LEVOTHYROXINE SODIUM 125 MCG
75 TABLET ORAL DAILY
Refills: 0 | Status: DISCONTINUED | OUTPATIENT
Start: 2024-04-12 | End: 2024-04-16

## 2024-04-12 RX ORDER — ACETAMINOPHEN 500 MG
650 TABLET ORAL EVERY 6 HOURS
Refills: 0 | Status: DISCONTINUED | OUTPATIENT
Start: 2024-04-12 | End: 2024-04-16

## 2024-04-12 RX ORDER — LANOLIN ALCOHOL/MO/W.PET/CERES
3 CREAM (GRAM) TOPICAL AT BEDTIME
Refills: 0 | Status: DISCONTINUED | OUTPATIENT
Start: 2024-04-12 | End: 2024-04-16

## 2024-04-12 RX ADMIN — SODIUM CHLORIDE 75 MILLILITER(S): 9 INJECTION INTRAMUSCULAR; INTRAVENOUS; SUBCUTANEOUS at 22:46

## 2024-04-12 RX ADMIN — SODIUM CHLORIDE 1000 MILLILITER(S): 9 INJECTION INTRAMUSCULAR; INTRAVENOUS; SUBCUTANEOUS at 19:41

## 2024-04-12 RX ADMIN — SODIUM CHLORIDE 1000 MILLILITER(S): 9 INJECTION, SOLUTION INTRAVENOUS at 18:31

## 2024-04-12 NOTE — ED PROVIDER NOTE - PHYSICAL EXAMINATION
VITAL SIGNS: I have reviewed nursing notes and confirm.  CONSTITUTIONAL: Patient is in no acute distress.  SKIN: Skin exam is warm and dry, no acute rash.  HEAD: Normocephalic; atraumatic.  EYES: PERRL, EOM intact; conjunctiva and sclera clear.  ENT: No nasal discharge; airway clear.   NECK: Supple; non tender.  CARD: S1, S2 normal; no murmurs, gallops, or rubs. Regular rate and rhythm.  RESP: Clear to auscultation bilaterally. No wheezes, rales or rhonchi.  ABD: Normal bowel sounds; soft; non-distended; non-tender.   EXT: Normal ROM. No edema.  LYMPH: No acute cervical adenopathy.  NEURO: Awake and alert. Nonverbal. Not following commands. No focal deficits.

## 2024-04-12 NOTE — H&P ADULT - HISTORY OF PRESENT ILLNESS
35 y/o female with past medical history of autism, bipolar, diabetes, GERD, hyperlipidemia, hypertension, hypothyroidism presented for evaluation of diarrhea over the last 2 days and change in behavior which makes group home staff think that she might be in pain.  Patient is nonverbal.  No fevers or chills.  No vomiting. 35 y/o female with past medical history of autism, bipolar, diabetes, GERD, hyperlipidemia, hypertension, hypothyroidism presented for evaluation of diarrhea over the last 2 days and change in behavior which makes group home staff think that she might be in pain.  Patient is nonverbal.  No fevers or chills.  No vomiting.    Home meds obtained from [facility] from doc visit in March 2024.  Pt did not have paperwork from adult home available.

## 2024-04-12 NOTE — H&P ADULT - NS ATTEND AMEND GEN_ALL_CORE FT
As above but would try to obtain current medication list from group home when available for confirmation As above, appears to have a prerenal KIRA (GI loss?)  which should also improve with NS infusion. Closely monitor labs. Would try to obtain current medication list from group home when available for confirmation

## 2024-04-12 NOTE — ED ADULT NURSE NOTE - OBJECTIVE STATEMENT
pt nonverbal, as per pt aide/nurse pt behavior not normal, something is bothering her on a severe level, and she has out of control diarrhea, she has a more pale complexion than usual

## 2024-04-12 NOTE — ED PROVIDER NOTE - ATTENDING APP SHARED VISIT CONTRIBUTION OF CARE
34-year-old female above past medical history in the midst of an extensive endocrine workup brought in by group home staff for watery diarrhea and a behavioral change, of note did have her Depakote increased 2 months ago, on exam vitals appreciated, nontoxic, nonverbal, abdomen soft nontender, labs and studies appreciated, last labs in our system are from December with a normal sodium, group home staff able to locate more recent labs with a sodium in the 130s, will admit for hydration and repeat labs

## 2024-04-12 NOTE — ED PROVIDER NOTE - OBJECTIVE STATEMENT
34-year-old female with past medical history of autism, bipolar, diabetes, GERD, hyperlipidemia, hypertension, hypothyroidism presented for evaluation of diarrhea over the last 2 days and change in behavior which makes group home staff think that she might be in pain.  Patient is nonverbal.  No fevers or chills.  No vomiting.

## 2024-04-12 NOTE — H&P ADULT - ASSESSMENT
34-year-old female with past medical history of autism, bipolar, diabetes, GERD, hyperlipidemia, hypertension, hypothyroidism presented for evaluation of diarrhea over the last 2 days and change in behavior which makes group home staff think that she might be in pain.  Patient is nonverbal.  No fevers or chills.  No vomiting. 34-year-old female with past medical history of autism, bipolar, diabetes, GERD, hyperlipidemia, hypertension, hypothyroidism presented for evaluation of diarrhea over the last 2 days and change in behavior which makes group home staff think that she might be in pain.  Patient is nonverbal.  No fevers or chills.  No vomiting.      # Hyponatremia  - NS at 75cc/hr  - repeat labs in am    # DM  - FS with SS    # Hypothyroid  - c/w home meds    # Autism  - single word commands  - allow time for pt to process   - speak to pt in calming tone  - make eye contact    # Bipolar  - c/w home meds

## 2024-04-13 LAB
A1C WITH ESTIMATED AVERAGE GLUCOSE RESULT: 6.3 % — HIGH (ref 4–5.6)
ALBUMIN SERPL ELPH-MCNC: 3.8 G/DL — SIGNIFICANT CHANGE UP (ref 3.5–5.2)
ALP SERPL-CCNC: 82 U/L — SIGNIFICANT CHANGE UP (ref 30–115)
ALT FLD-CCNC: 19 U/L — SIGNIFICANT CHANGE UP (ref 0–41)
ANION GAP SERPL CALC-SCNC: 9 MMOL/L — SIGNIFICANT CHANGE UP (ref 7–14)
AST SERPL-CCNC: 34 U/L — SIGNIFICANT CHANGE UP (ref 0–41)
BASOPHILS # BLD AUTO: 0 K/UL — SIGNIFICANT CHANGE UP (ref 0–0.2)
BASOPHILS NFR BLD AUTO: 0 % — SIGNIFICANT CHANGE UP (ref 0–1)
BILIRUB SERPL-MCNC: 0.2 MG/DL — SIGNIFICANT CHANGE UP (ref 0.2–1.2)
BUN SERPL-MCNC: 20 MG/DL — SIGNIFICANT CHANGE UP (ref 10–20)
CALCIUM SERPL-MCNC: 8.8 MG/DL — SIGNIFICANT CHANGE UP (ref 8.4–10.5)
CHLORIDE SERPL-SCNC: 116 MMOL/L — HIGH (ref 98–110)
CO2 SERPL-SCNC: 23 MMOL/L — SIGNIFICANT CHANGE UP (ref 17–32)
CREAT SERPL-MCNC: 1.6 MG/DL — HIGH (ref 0.7–1.5)
EGFR: 43 ML/MIN/1.73M2 — LOW
EOSINOPHIL NFR BLD AUTO: 0 % — SIGNIFICANT CHANGE UP (ref 0–8)
ESTIMATED AVERAGE GLUCOSE: 134 MG/DL — HIGH (ref 68–114)
GLUCOSE BLDC GLUCOMTR-MCNC: 100 MG/DL — HIGH (ref 70–99)
GLUCOSE BLDC GLUCOMTR-MCNC: 104 MG/DL — HIGH (ref 70–99)
GLUCOSE BLDC GLUCOMTR-MCNC: 115 MG/DL — HIGH (ref 70–99)
GLUCOSE BLDC GLUCOMTR-MCNC: 94 MG/DL — SIGNIFICANT CHANGE UP (ref 70–99)
GLUCOSE SERPL-MCNC: 142 MG/DL — HIGH (ref 70–99)
HCT VFR BLD CALC: 43.8 % — SIGNIFICANT CHANGE UP (ref 37–47)
HGB BLD-MCNC: 14.7 G/DL — SIGNIFICANT CHANGE UP (ref 12–16)
LYMPHOCYTES # BLD AUTO: 1.47 K/UL — SIGNIFICANT CHANGE UP (ref 1.2–3.4)
LYMPHOCYTES # BLD AUTO: 36 % — SIGNIFICANT CHANGE UP (ref 20.5–51.1)
MCHC RBC-ENTMCNC: 32.6 PG — HIGH (ref 27–31)
MCHC RBC-ENTMCNC: 33.6 G/DL — SIGNIFICANT CHANGE UP (ref 32–37)
MCV RBC AUTO: 97.1 FL — SIGNIFICANT CHANGE UP (ref 81–99)
MONOCYTES # BLD AUTO: 0.77 K/UL — HIGH (ref 0.1–0.6)
MONOCYTES NFR BLD AUTO: 19 % — HIGH (ref 1.7–9.3)
NEUTROPHILS # BLD AUTO: 1.79 K/UL — SIGNIFICANT CHANGE UP (ref 1.4–6.5)
NEUTROPHILS NFR BLD AUTO: 40 % — LOW (ref 42.2–75.2)
NRBC # BLD: SIGNIFICANT CHANGE UP /100 WBCS (ref 0–0)
PLATELET # BLD AUTO: 91 K/UL — LOW (ref 130–400)
PMV BLD: 10.2 FL — SIGNIFICANT CHANGE UP (ref 7.4–10.4)
POTASSIUM SERPL-MCNC: 4.4 MMOL/L — SIGNIFICANT CHANGE UP (ref 3.5–5)
POTASSIUM SERPL-SCNC: 4.4 MMOL/L — SIGNIFICANT CHANGE UP (ref 3.5–5)
PROT SERPL-MCNC: 6.6 G/DL — SIGNIFICANT CHANGE UP (ref 6–8)
RBC # BLD: 4.51 M/UL — SIGNIFICANT CHANGE UP (ref 4.2–5.4)
RBC # FLD: 12.9 % — SIGNIFICANT CHANGE UP (ref 11.5–14.5)
SODIUM SERPL-SCNC: 148 MMOL/L — HIGH (ref 135–146)
TSH SERPL-MCNC: 1.3 UIU/ML — SIGNIFICANT CHANGE UP (ref 0.27–4.2)
WBC # BLD: 4.07 K/UL — LOW (ref 4.8–10.8)
WBC # FLD AUTO: 4.07 K/UL — LOW (ref 4.8–10.8)

## 2024-04-13 PROCEDURE — 99232 SBSQ HOSP IP/OBS MODERATE 35: CPT

## 2024-04-13 RX ORDER — HALOPERIDOL DECANOATE 100 MG/ML
5 INJECTION INTRAMUSCULAR EVERY 6 HOURS
Refills: 0 | Status: DISCONTINUED | OUTPATIENT
Start: 2024-04-13 | End: 2024-04-16

## 2024-04-13 RX ADMIN — DAPAGLIFLOZIN 10 MILLIGRAM(S): 10 TABLET, FILM COATED ORAL at 05:48

## 2024-04-13 RX ADMIN — Medication 1 APPLICATION(S): at 05:49

## 2024-04-13 RX ADMIN — DIVALPROEX SODIUM 500 MILLIGRAM(S): 500 TABLET, DELAYED RELEASE ORAL at 05:46

## 2024-04-13 RX ADMIN — Medication 1 APPLICATION(S): at 17:51

## 2024-04-13 RX ADMIN — Medication 100 MILLIGRAM(S): at 05:47

## 2024-04-13 RX ADMIN — Medication 75 MICROGRAM(S): at 05:47

## 2024-04-13 RX ADMIN — DIVALPROEX SODIUM 500 MILLIGRAM(S): 500 TABLET, DELAYED RELEASE ORAL at 17:50

## 2024-04-13 RX ADMIN — OLANZAPINE 5 MILLIGRAM(S): 15 TABLET, FILM COATED ORAL at 05:48

## 2024-04-13 RX ADMIN — SODIUM CHLORIDE 75 MILLILITER(S): 9 INJECTION INTRAMUSCULAR; INTRAVENOUS; SUBCUTANEOUS at 04:13

## 2024-04-13 RX ADMIN — Medication 100 MILLIGRAM(S): at 17:50

## 2024-04-13 RX ADMIN — Medication 5000 UNIT(S): at 12:30

## 2024-04-13 RX ADMIN — OLANZAPINE 5 MILLIGRAM(S): 15 TABLET, FILM COATED ORAL at 17:50

## 2024-04-13 RX ADMIN — HALOPERIDOL DECANOATE 5 MILLIGRAM(S): 100 INJECTION INTRAMUSCULAR at 15:34

## 2024-04-13 NOTE — PROGRESS NOTE ADULT - ASSESSMENT
Assessment and Plan:   	  34-year-old female with past medical history of autism, bipolar, diabetes, GERD, hyperlipidemia, hypertension, hypothyroidism presented for evaluation of diarrhea over the last 2 days and change in behavior which makes group home staff think that she might be in pain.  Patient is nonverbal.  No fevers or chills.  No vomiting.      # Hypotonic  Hyponatremia  - NS at 75cc/hr  - bmp bid    # DM  - ISS  - maintain Fs 140-180    # Hypothyroid  - c/w home meds    # Autism  - single word commands  - allow time for pt to process   - speak to pt in calming tone  - make eye contact    # Bipolar  - c/w home meds

## 2024-04-13 NOTE — PATIENT PROFILE ADULT - FALL HARM RISK - HARM RISK INTERVENTIONS

## 2024-04-14 LAB
ALBUMIN SERPL ELPH-MCNC: 3.5 G/DL — SIGNIFICANT CHANGE UP (ref 3.5–5.2)
ALP SERPL-CCNC: 60 U/L — SIGNIFICANT CHANGE UP (ref 30–115)
ALT FLD-CCNC: 22 U/L — SIGNIFICANT CHANGE UP (ref 0–41)
ANION GAP SERPL CALC-SCNC: 10 MMOL/L — SIGNIFICANT CHANGE UP (ref 7–14)
AST SERPL-CCNC: 49 U/L — HIGH (ref 0–41)
BASOPHILS # BLD AUTO: 0.02 K/UL — SIGNIFICANT CHANGE UP (ref 0–0.2)
BASOPHILS NFR BLD AUTO: 0.5 % — SIGNIFICANT CHANGE UP (ref 0–1)
BILIRUB SERPL-MCNC: 0.3 MG/DL — SIGNIFICANT CHANGE UP (ref 0.2–1.2)
BUN SERPL-MCNC: 14 MG/DL — SIGNIFICANT CHANGE UP (ref 10–20)
CALCIUM SERPL-MCNC: 8.6 MG/DL — SIGNIFICANT CHANGE UP (ref 8.4–10.5)
CHLORIDE SERPL-SCNC: 109 MMOL/L — SIGNIFICANT CHANGE UP (ref 98–110)
CO2 SERPL-SCNC: 23 MMOL/L — SIGNIFICANT CHANGE UP (ref 17–32)
CREAT SERPL-MCNC: 1.9 MG/DL — HIGH (ref 0.7–1.5)
CULTURE RESULTS: SIGNIFICANT CHANGE UP
EGFR: 35 ML/MIN/1.73M2 — LOW
EOSINOPHIL # BLD AUTO: 0.04 K/UL — SIGNIFICANT CHANGE UP (ref 0–0.7)
EOSINOPHIL NFR BLD AUTO: 1 % — SIGNIFICANT CHANGE UP (ref 0–8)
GLUCOSE BLDC GLUCOMTR-MCNC: 108 MG/DL — HIGH (ref 70–99)
GLUCOSE BLDC GLUCOMTR-MCNC: 118 MG/DL — HIGH (ref 70–99)
GLUCOSE BLDC GLUCOMTR-MCNC: 165 MG/DL — HIGH (ref 70–99)
GLUCOSE BLDC GLUCOMTR-MCNC: 85 MG/DL — SIGNIFICANT CHANGE UP (ref 70–99)
GLUCOSE SERPL-MCNC: 141 MG/DL — HIGH (ref 70–99)
HCT VFR BLD CALC: 39.7 % — SIGNIFICANT CHANGE UP (ref 37–47)
HGB BLD-MCNC: 12.9 G/DL — SIGNIFICANT CHANGE UP (ref 12–16)
IMM GRANULOCYTES NFR BLD AUTO: 0.8 % — HIGH (ref 0.1–0.3)
LYMPHOCYTES # BLD AUTO: 1.64 K/UL — SIGNIFICANT CHANGE UP (ref 1.2–3.4)
LYMPHOCYTES # BLD AUTO: 41.9 % — SIGNIFICANT CHANGE UP (ref 20.5–51.1)
MCHC RBC-ENTMCNC: 32.5 G/DL — SIGNIFICANT CHANGE UP (ref 32–37)
MCHC RBC-ENTMCNC: 32.5 PG — HIGH (ref 27–31)
MCV RBC AUTO: 100 FL — HIGH (ref 81–99)
MONOCYTES # BLD AUTO: 0.79 K/UL — HIGH (ref 0.1–0.6)
MONOCYTES NFR BLD AUTO: 20.2 % — HIGH (ref 1.7–9.3)
NEUTROPHILS # BLD AUTO: 1.39 K/UL — LOW (ref 1.4–6.5)
NEUTROPHILS NFR BLD AUTO: 35.6 % — LOW (ref 42.2–75.2)
NRBC # BLD: 0 /100 WBCS — SIGNIFICANT CHANGE UP (ref 0–0)
PLATELET # BLD AUTO: 92 K/UL — LOW (ref 130–400)
PMV BLD: 10 FL — SIGNIFICANT CHANGE UP (ref 7.4–10.4)
POTASSIUM SERPL-MCNC: 4.1 MMOL/L — SIGNIFICANT CHANGE UP (ref 3.5–5)
POTASSIUM SERPL-SCNC: 4.1 MMOL/L — SIGNIFICANT CHANGE UP (ref 3.5–5)
PROT SERPL-MCNC: 6 G/DL — SIGNIFICANT CHANGE UP (ref 6–8)
RBC # BLD: 3.97 M/UL — LOW (ref 4.2–5.4)
RBC # FLD: 13.4 % — SIGNIFICANT CHANGE UP (ref 11.5–14.5)
SODIUM SERPL-SCNC: 142 MMOL/L — SIGNIFICANT CHANGE UP (ref 135–146)
SPECIMEN SOURCE: SIGNIFICANT CHANGE UP
WBC # BLD: 3.91 K/UL — LOW (ref 4.8–10.8)
WBC # FLD AUTO: 3.91 K/UL — LOW (ref 4.8–10.8)

## 2024-04-14 PROCEDURE — 99232 SBSQ HOSP IP/OBS MODERATE 35: CPT

## 2024-04-14 PROCEDURE — 73630 X-RAY EXAM OF FOOT: CPT | Mod: 26,LT

## 2024-04-14 RX ADMIN — Medication 100 MILLIGRAM(S): at 17:51

## 2024-04-14 RX ADMIN — HALOPERIDOL DECANOATE 5 MILLIGRAM(S): 100 INJECTION INTRAMUSCULAR at 10:33

## 2024-04-14 RX ADMIN — DIVALPROEX SODIUM 500 MILLIGRAM(S): 500 TABLET, DELAYED RELEASE ORAL at 05:04

## 2024-04-14 RX ADMIN — Medication 75 MICROGRAM(S): at 05:04

## 2024-04-14 RX ADMIN — HALOPERIDOL DECANOATE 5 MILLIGRAM(S): 100 INJECTION INTRAMUSCULAR at 03:39

## 2024-04-14 RX ADMIN — OLANZAPINE 5 MILLIGRAM(S): 15 TABLET, FILM COATED ORAL at 05:04

## 2024-04-14 RX ADMIN — DAPAGLIFLOZIN 10 MILLIGRAM(S): 10 TABLET, FILM COATED ORAL at 05:04

## 2024-04-14 RX ADMIN — POLYETHYLENE GLYCOL 3350 17 GRAM(S): 17 POWDER, FOR SOLUTION ORAL at 09:04

## 2024-04-14 RX ADMIN — Medication 1 APPLICATION(S): at 05:05

## 2024-04-14 RX ADMIN — Medication 5000 UNIT(S): at 12:46

## 2024-04-14 RX ADMIN — OLANZAPINE 5 MILLIGRAM(S): 15 TABLET, FILM COATED ORAL at 17:51

## 2024-04-14 RX ADMIN — DIVALPROEX SODIUM 500 MILLIGRAM(S): 500 TABLET, DELAYED RELEASE ORAL at 17:51

## 2024-04-14 RX ADMIN — Medication 100 MILLIGRAM(S): at 05:04

## 2024-04-14 RX ADMIN — Medication 1 APPLICATION(S): at 17:52

## 2024-04-14 NOTE — PROGRESS NOTE ADULT - ASSESSMENT
Assessment and Plan:   	  34-year-old female with past medical history of autism, bipolar, diabetes, GERD, hyperlipidemia, hypertension, hypothyroidism presented for evaluation of diarrhea over the last 2 days and change in behavior which makes group home staff think that she might be in pain.  Patient is nonverbal.  No fevers or chills.  No vomiting.      # Hypotonic  Hyponatremia, overcorrected to hypernatremia  - Na went from 127>148>142  - stopped all IVF; pt pulling at IV lines  - bmp qd for now  - pt asymptomatic and at baseline    #diarrhea  - no BMs since admission?  - GI PCR if she has one  - unable to tell if abd is tender    #toe bruise  -  director wants XR of toe  - might be difficult given mental status  - haldol prn available    # DM  - ISS  - maintain Fs 140-180    # Hypothyroid  - c/w home meds    # Autism  - single word commands  - allow time for pt to process   - speak to pt in calming tone  - make eye contact    # Bipolar  - c/w home meds    Handoff: If pt has no more diarrhea or vomiting overnight and eats/drinks normally and no gross fluctuations of Na, can be discharged tomorrow.  director updated today by PA. anticipated

## 2024-04-15 LAB
ANION GAP SERPL CALC-SCNC: 11 MMOL/L — SIGNIFICANT CHANGE UP (ref 7–14)
BASOPHILS # BLD AUTO: 0.03 K/UL — SIGNIFICANT CHANGE UP (ref 0–0.2)
BASOPHILS NFR BLD AUTO: 0.6 % — SIGNIFICANT CHANGE UP (ref 0–1)
BUN SERPL-MCNC: 17 MG/DL — SIGNIFICANT CHANGE UP (ref 10–20)
CALCIUM SERPL-MCNC: 9 MG/DL — SIGNIFICANT CHANGE UP (ref 8.4–10.5)
CHLORIDE SERPL-SCNC: 112 MMOL/L — HIGH (ref 98–110)
CO2 SERPL-SCNC: 22 MMOL/L — SIGNIFICANT CHANGE UP (ref 17–32)
CREAT SERPL-MCNC: 1.7 MG/DL — HIGH (ref 0.7–1.5)
EGFR: 40 ML/MIN/1.73M2 — LOW
EOSINOPHIL # BLD AUTO: 0.04 K/UL — SIGNIFICANT CHANGE UP (ref 0–0.7)
EOSINOPHIL NFR BLD AUTO: 0.9 % — SIGNIFICANT CHANGE UP (ref 0–8)
GLUCOSE BLDC GLUCOMTR-MCNC: 108 MG/DL — HIGH (ref 70–99)
GLUCOSE BLDC GLUCOMTR-MCNC: 149 MG/DL — HIGH (ref 70–99)
GLUCOSE BLDC GLUCOMTR-MCNC: 189 MG/DL — HIGH (ref 70–99)
GLUCOSE BLDC GLUCOMTR-MCNC: 255 MG/DL — HIGH (ref 70–99)
GLUCOSE SERPL-MCNC: 122 MG/DL — HIGH (ref 70–99)
HCT VFR BLD CALC: 41.1 % — SIGNIFICANT CHANGE UP (ref 37–47)
HGB BLD-MCNC: 13.6 G/DL — SIGNIFICANT CHANGE UP (ref 12–16)
IMM GRANULOCYTES NFR BLD AUTO: 0.9 % — HIGH (ref 0.1–0.3)
LYMPHOCYTES # BLD AUTO: 2.22 K/UL — SIGNIFICANT CHANGE UP (ref 1.2–3.4)
LYMPHOCYTES # BLD AUTO: 47.7 % — SIGNIFICANT CHANGE UP (ref 20.5–51.1)
MCHC RBC-ENTMCNC: 33.1 G/DL — SIGNIFICANT CHANGE UP (ref 32–37)
MCHC RBC-ENTMCNC: 33.1 PG — HIGH (ref 27–31)
MCV RBC AUTO: 100 FL — HIGH (ref 81–99)
MONOCYTES # BLD AUTO: 0.9 K/UL — HIGH (ref 0.1–0.6)
MONOCYTES NFR BLD AUTO: 19.4 % — HIGH (ref 1.7–9.3)
NEUTROPHILS # BLD AUTO: 1.42 K/UL — SIGNIFICANT CHANGE UP (ref 1.4–6.5)
NEUTROPHILS NFR BLD AUTO: 30.5 % — LOW (ref 42.2–75.2)
NRBC # BLD: 0 /100 WBCS — SIGNIFICANT CHANGE UP (ref 0–0)
PLATELET # BLD AUTO: 96 K/UL — LOW (ref 130–400)
PMV BLD: 10.1 FL — SIGNIFICANT CHANGE UP (ref 7.4–10.4)
POTASSIUM SERPL-MCNC: 4.4 MMOL/L — SIGNIFICANT CHANGE UP (ref 3.5–5)
POTASSIUM SERPL-SCNC: 4.4 MMOL/L — SIGNIFICANT CHANGE UP (ref 3.5–5)
RBC # BLD: 4.11 M/UL — LOW (ref 4.2–5.4)
RBC # FLD: 13.3 % — SIGNIFICANT CHANGE UP (ref 11.5–14.5)
SODIUM SERPL-SCNC: 145 MMOL/L — SIGNIFICANT CHANGE UP (ref 135–146)
WBC # BLD: 4.65 K/UL — LOW (ref 4.8–10.8)
WBC # FLD AUTO: 4.65 K/UL — LOW (ref 4.8–10.8)

## 2024-04-15 PROCEDURE — 99232 SBSQ HOSP IP/OBS MODERATE 35: CPT

## 2024-04-15 RX ADMIN — Medication 75 MICROGRAM(S): at 05:14

## 2024-04-15 RX ADMIN — DIVALPROEX SODIUM 500 MILLIGRAM(S): 500 TABLET, DELAYED RELEASE ORAL at 05:12

## 2024-04-15 RX ADMIN — Medication 100 MILLIGRAM(S): at 17:23

## 2024-04-15 RX ADMIN — OLANZAPINE 5 MILLIGRAM(S): 15 TABLET, FILM COATED ORAL at 05:12

## 2024-04-15 RX ADMIN — DIVALPROEX SODIUM 500 MILLIGRAM(S): 500 TABLET, DELAYED RELEASE ORAL at 17:24

## 2024-04-15 RX ADMIN — Medication 1 APPLICATION(S): at 05:16

## 2024-04-15 RX ADMIN — DAPAGLIFLOZIN 10 MILLIGRAM(S): 10 TABLET, FILM COATED ORAL at 05:12

## 2024-04-15 RX ADMIN — Medication 5000 UNIT(S): at 11:39

## 2024-04-15 RX ADMIN — HALOPERIDOL DECANOATE 5 MILLIGRAM(S): 100 INJECTION INTRAMUSCULAR at 09:52

## 2024-04-15 RX ADMIN — OLANZAPINE 5 MILLIGRAM(S): 15 TABLET, FILM COATED ORAL at 17:24

## 2024-04-15 RX ADMIN — Medication 100 MILLIGRAM(S): at 05:12

## 2024-04-15 RX ADMIN — Medication 1: at 12:01

## 2024-04-15 NOTE — PROGRESS NOTE ADULT - ASSESSMENT
34-year-old female with past medical history of autism, bipolar, diabetes, GERD, hyperlipidemia, hypertension, hypothyroidism presented for evaluation of diarrhea over the last 2 days and change in behavior which makes group home staff think that she might be in pain.  Patient is nonverbal.  No fevers or chills.  No vomiting.    Hypovolemic  Hyponatremia, overcorrected to hypernatremia  - Na went from 127>148>142>>145  -  pt pulling at IV lines, IV stopped, encourage PO intake   - pt asymptomatic and at baseline  -Monitor BMP      Autism + Bipolar:  c/w home meds  Diarrhea:  no BM since admission: resolved   Toe bruise: Likely from episodes of Agitation,  X-Ray negative   Type II DM >.ISS as needed, maintain Fs 140-180  Hypothyroid: Synthroid       DVT PPX: Lovenox   GI PPX: feeding   Full Code   Dispo: from Group Home   Anticipate for AM, monitoring PO intake and Na+ lavels    34-year-old female with past medical history of autism, bipolar, diabetes, GERD, hyperlipidemia, hypertension, hypothyroidism presented for evaluation of diarrhea over the last 2 days and change in behavior which makes group home staff think that she might be in pain.  Patient is nonverbal.  No fevers or chills.  No vomiting.    Hypovolemic  Hyponatremia, overcorrected to hypernatremia  - Na went from 127>148>142>>145  - pt pulling at IV lines, IV stopped, encourage PO intake   - pt asymptomatic and at baseline  -Monitor BMP     Acute Kidney Injury: Likely Pre-renal: Improving   Unsure of baseline has had multiple bouts of KIRA within the last year   Presented with Scr of 2.1, last normal was around ( 1.0-1.4 in 2022)     Autism + Bipolar:  c/w home meds  Diarrhea:  no BM since admission: resolved   Toe bruise: Likely from episodes of Agitation,  X-Ray negative   Type II DM >.ISS as needed, maintain -180  Hypothyroid: Synthroid       DVT PPX: Lovenox   GI PPX: feeding   Full Code   Dispo: from Group Home   Anticipate for AM, monitoring PO intake and Na+ levels

## 2024-04-15 NOTE — PROGRESS NOTE ADULT - SUBJECTIVE AND OBJECTIVE BOX
Patient is a 34y old  Female who presents with a chief complaint of diarrhea (14 Apr 2024 16:46)      MEDICATIONS  (STANDING):  ammonium lactate 12% Lotion 1 Application(s) Topical every 12 hours  cholecalciferol 5000 Unit(s) Oral daily  dapagliflozin 10 milliGRAM(s) Oral every 24 hours  dextrose 10% Bolus 125 milliLiter(s) IV Bolus once  dextrose 5%. 1000 milliLiter(s) (50 mL/Hr) IV Continuous <Continuous>  dextrose 5%. 1000 milliLiter(s) (100 mL/Hr) IV Continuous <Continuous>  dextrose 50% Injectable 25 Gram(s) IV Push once  dextrose 50% Injectable 12.5 Gram(s) IV Push once  divalproex  milliGRAM(s) Oral every 12 hours  glucagon  Injectable 1 milliGRAM(s) IntraMuscular once  insulin lispro (ADMELOG) corrective regimen sliding scale   SubCutaneous three times a day before meals  levothyroxine 75 MICROGram(s) Oral daily  OLANZapine 5 milliGRAM(s) Oral two times a day  polyethylene glycol 3350 17 Gram(s) Oral <User Schedule>  topiramate 100 milliGRAM(s) Oral every 12 hours    MEDICATIONS  (PRN):  acetaminophen     Tablet .. 650 milliGRAM(s) Oral every 6 hours PRN Temp greater or equal to 38C (100.4F), Mild Pain (1 - 3)  aluminum hydroxide/magnesium hydroxide/simethicone Suspension 30 milliLiter(s) Oral every 4 hours PRN Dyspepsia  dextrose Oral Gel 15 Gram(s) Oral once PRN Blood Glucose LESS THAN 70 milliGRAM(s)/deciliter  haloperidol    Injectable 5 milliGRAM(s) IV Push every 6 hours PRN agitation  melatonin 3 milliGRAM(s) Oral at bedtime PRN Insomnia  ondansetron Injectable 4 milliGRAM(s) IV Push every 8 hours PRN Nausea and/or Vomiting      CAPILLARY BLOOD GLUCOSE  POCT Blood Glucose.: 189 mg/dL (15 Apr 2024 11:47)  POCT Blood Glucose.: 108 mg/dL (15 Apr 2024 07:32)  POCT Blood Glucose.: 165 mg/dL (14 Apr 2024 20:58)  POCT Blood Glucose.: 85 mg/dL (14 Apr 2024 17:52)    I&O's Summary      PHYSICAL EXAM:  Vital Signs Last 24 Hrs  T(C): 36.1 (15 Apr 2024 13:40), Max: 36.1 (14 Apr 2024 21:05)  T(F): 96.9 (15 Apr 2024 13:40), Max: 96.9 (14 Apr 2024 21:05)  HR: 69 (15 Apr 2024 13:40) (69 - 87)  BP: 112/56 (15 Apr 2024 13:40) (112/56 - 115/78)  BP(mean): --  RR: 18 (15 Apr 2024 13:40) (18 - 19)  SpO2: 97% (15 Apr 2024 13:40) (97% - 100%)    Parameters below as of 14 Apr 2024 21:05  Patient On (Oxygen Delivery Method): room air            LABS:                        13.6   4.65  )-----------( 96       ( 15 Apr 2024 08:34 )             41.1     04-15    145  |  112<H>  |  17  ----------------------------<  122<H>  4.4   |  22  |  1.7<H>    Ca    9.0      15 Apr 2024 08:34    TPro  6.0  /  Alb  3.5  /  TBili  0.3  /  DBili  x   /  AST  49<H>  /  ALT  22  /  AlkPhos  60  04-14      Urinalysis Basic - ( 15 Apr 2024 08:34 )    Color: x / Appearance: x / SG: x / pH: x  Gluc: 122 mg/dL / Ketone: x  / Bili: x / Urobili: x   Blood: x / Protein: x / Nitrite: x   Leuk Esterase: x / RBC: x / WBC x   Sq Epi: x / Non Sq Epi: x / Bacteria: x    Culture - Urine (collected 12 Apr 2024 20:55)  Source: Clean Catch Clean Catch (Midstream)  Final Report (14 Apr 2024 00:52):    <10,000 CFU/mL Normal Urogenital [place]        
    LUIS BROWN  34y, Female  Allergy: Augmentin (Stomach Upset)  penicillin (Unknown)      CHIEF COMPLAINT: diarrhea and pain    SUBJECTIVE: Pt was hiding under covers and playing on her ipad. GH attendant said she was unsure about bowel movements. None documented.     HPI:  33 y/o female with past medical history of autism, bipolar, diabetes, GERD, hyperlipidemia, hypertension, hypothyroidism presented for evaluation of diarrhea over the last 2 days and change in behavior which makes group home staff think that she might be in pain.  Patient is nonverbal.  No fevers or chills.  No vomiting.    Home meds obtained from [facility] from doc visit in March 2024.  Pt did not have paperwork from adult home available. (12 Apr 2024 21:02)    HPI:    FAMILY HISTORY:  FH: COPD (chronic obstructive pulmonary disease) (Father, Mother, Grandparent)    FH: HTN (hypertension) (Mother)    Family history of Raynaud's syndrome (Father)    Family history of von Willebrand disease (Grandparent)      PAST MEDICAL & SURGICAL HISTORY:  Autism      Hypothyroid      Diabetes      Bipolar mood disorder      Hypertension      History of constipation      Amenorrhea      Chronic kidney disease      H/O closed fracture of nasal bones      H/O constipation      GERD (gastroesophageal reflux disease)      H/O hiatal hernia      High cholesterol      PBA (pseudobulbar affect)      No significant past surgical history          SOCIAL HISTORY  Social History:      Home Medications:  ammonium lactate topical cream: Apply topically to affected area 2 times a day (12 Apr 2024 16:56)  divalproex sodium 250 mg oral delayed release tablet: 2 tab(s) orally once a day at 4 pm (12 Apr 2024 21:05)  divalproex sodium 500 mg oral delayed release tablet: 2 tab(s) orally once a day (at bedtime) (12 Apr 2024 21:05)  divalproex sodium 500 mg oral delayed release tablet: 1 tab(s) orally once a day at 7am (12 Apr 2024 16:49)  docusate 100mg PO 1 tab 2 times daily at 7am and 9pm:  (12 Apr 2024 16:56)  Farxiga 10 mg oral tablet: 1 tab(s) orally once a day (12 Apr 2024 16:56)  Januvia 50 mg oral tablet: 1 tab(s) orally once a day (12 Apr 2024 16:56)  OLANZapine 5 mg oral tablet: 1 tab(s) orally 2 times a day (12 Apr 2024 16:56)  pioglitazone 30 mg oral tablet: 1 tab(s) orally once a day (12 Apr 2024 21:05)  polyethylene glycol 3350 oral powder for reconstitution: 17 gram(s) orally 2 times a week Sunday and thursday at 9pm (12 Apr 2024 16:56)  pravastatin 40 mg oral tablet: 1 tab(s) orally once a day (12 Apr 2024 16:56)  propranolol 20 mg oral tablet: 1 tab(s) orally 2 times a day (12 Apr 2024 21:05)  Synthroid 75 mcg (0.075 mg) oral tablet: 1 tab(s) orally once a day (12 Apr 2024 21:05)  topiramate 100 mg oral tablet: 1 tab(s) orally 2 times a day at 7am and 9pm (12 Apr 2024 16:56)  Vitamin D3 125 mcg (5000 intl units) oral tablet: 1 tab(s) orally once a day (12 Apr 2024 16:56)      ROS  cannot answer due to being non verbal at baseline    VITALS:  T(F): 97.2, Max: 97.8 (04-12-24 @ 23:58)  HR: 62  BP: 114/67  RR: 18Vital Signs Last 24 Hrs  T(C): 36.2 (13 Apr 2024 20:50), Max: 36.6 (12 Apr 2024 23:58)  T(F): 97.2 (13 Apr 2024 20:50), Max: 97.8 (12 Apr 2024 23:58)  HR: 62 (13 Apr 2024 20:50) (60 - 72)  BP: 114/67 (13 Apr 2024 20:50) (114/67 - 120/81)  BP(mean): --  RR: 18 (13 Apr 2024 20:50) (17 - 18)  SpO2: 98% (13 Apr 2024 20:50) (98% - 99%)    Parameters below as of 13 Apr 2024 20:50  Patient On (Oxygen Delivery Method): room air        PHYSICAL EXAM:  Gen: NAD, resting in bed mixed with screaming  HEENT: Normocephalic, atraumatic  Neck: supple, no lymphadenopathy  CV: Regular rate & regular rhythm  Lungs: CTABL no wheeze  Abdomen: Soft, NTND+ BS present  Ext: Warm, well perfused no CCE  Skin: no rash, no erythema  Psych: developmentally delayed    TESTS & MEASUREMENTS:                        14.7   4.07  )-----------( 91       ( 13 Apr 2024 09:00 )             43.8     04-13    148<H>  |  116<H>  |  20  ----------------------------<  142<H>  4.4   |  23  |  1.6<H>    Ca    8.8      13 Apr 2024 09:00  Mg     2.0     04-12    TPro  6.6  /  Alb  3.8  /  TBili  0.2  /  DBili  x   /  AST  34  /  ALT  19  /  AlkPhos  82  04-13      LIVER FUNCTIONS - ( 13 Apr 2024 09:00 )  Alb: 3.8 g/dL / Pro: 6.6 g/dL / ALK PHOS: 82 U/L / ALT: 19 U/L / AST: 34 U/L / GGT: x           Urinalysis Basic - ( 13 Apr 2024 09:00 )    Color: x / Appearance: x / SG: x / pH: x  Gluc: 142 mg/dL / Ketone: x  / Bili: x / Urobili: x   Blood: x / Protein: x / Nitrite: x   Leuk Esterase: x / RBC: x / WBC x   Sq Epi: x / Non Sq Epi: x / Bacteria: x          Lactate, Blood: 0.7 mmol/L (04-12-24 @ 18:30)    QRS axis to [] ° and NSR at a rate of [] BPM. There was no atrial enlargement. There was no ventricular hypertrophy. There were no ST-T changes and all intervals were normal.      INFECTIOUS DISEASES TESTING      RADIOLOGY & ADDITIONAL TESTS:  I have personally reviewed the last Chest xray  CXR      CT  CT Abdomen and Pelvis No Cont:   ACC: [id number] EXAM:  CT ABDOMEN AND PELVIS   ORDERED BY: BLANKA MCCARTHY     PROCEDURE DATE:  04/12/2024          INTERPRETATION:  REASON FOR EXAM / CLINICAL STATEMENT:  Diarrhea. WBC _      PMHx of CKD (Cr2.1/GFR31)      TECHNIQUE:  Contiguous axial CT images were obtained from the diaphragms   through the pubic symphysis without IV contrast. Reformatted images in   the coronal and sagittal planes were acquired.      COMPARISON CT: None    OTHER STUDIES USED FOR CORRELATION: None.      TUBES AND LINES: None.    LOWER CHEST: The lung bases are clear. No pleural or pericardial effusion.    HEPATIC: The liver is normal in size with no evidence of solid mass or   bile duct dilatation.    BILIARY: No calcified gallstones are noted.    SPLEEN: Unremarkable.    PANCREAS: The pancreas is normal in size and configuration. No evidence   of mass or pancreatitis.    ADRENAL GLANDS: Unremarkable.    KIDNEYS: No evidence of hydronephrosis or calcified stones.    ABDOMINOPELVIC NODES: Unremarkable.    PELVIC ORGANS: No evidence of pelvic mass, lymphadenopathy, or fluid   collection.    BLADDER: Unremarkable.    PERITONEUM/MESENTERY/BOWEL: No evidence of bowel obstruction, colitis,   inflammatory process, or ascites. No pneumoperitoneum. The appendix is   unremarkable.    BONES/SOFT TISSUES: Mild degenerative changes of the spine are noted.    OTHER: Normal caliber aorta.    IMPRESSION:    No evidence of abdominal or pelvic mass or inflammatory process.    --- End of Report ---            VERONICA JONES MD; Attending Interventional Radiologist  This document has been electronically signed. Apr 12 2024  8:36PM (04-12-24 @ 19:49)      CARDIOLOGY TESTING      MEDICATIONS  (STANDING):  ammonium lactate 12% Lotion 1 Application(s) Topical every 12 hours  cholecalciferol 5000 Unit(s) Oral daily  dapagliflozin 10 milliGRAM(s) Oral every 24 hours  dextrose 10% Bolus 125 milliLiter(s) IV Bolus once  dextrose 5%. 1000 milliLiter(s) (50 mL/Hr) IV Continuous <Continuous>  dextrose 5%. 1000 milliLiter(s) (100 mL/Hr) IV Continuous <Continuous>  dextrose 50% Injectable 12.5 Gram(s) IV Push once  dextrose 50% Injectable 25 Gram(s) IV Push once  divalproex  milliGRAM(s) Oral every 12 hours  glucagon  Injectable 1 milliGRAM(s) IntraMuscular once  insulin lispro (ADMELOG) corrective regimen sliding scale   SubCutaneous three times a day before meals  levothyroxine 75 MICROGram(s) Oral daily  OLANZapine 5 milliGRAM(s) Oral two times a day  polyethylene glycol 3350 17 Gram(s) Oral <User Schedule>  sodium chloride 0.9%. 1000 milliLiter(s) (75 mL/Hr) IV Continuous <Continuous>  topiramate 100 milliGRAM(s) Oral every 12 hours    MEDICATIONS  (PRN):  acetaminophen     Tablet .. 650 milliGRAM(s) Oral every 6 hours PRN Temp greater or equal to 38C (100.4F), Mild Pain (1 - 3)  aluminum hydroxide/magnesium hydroxide/simethicone Suspension 30 milliLiter(s) Oral every 4 hours PRN Dyspepsia  dextrose Oral Gel 15 Gram(s) Oral once PRN Blood Glucose LESS THAN 70 milliGRAM(s)/deciliter  haloperidol    Injectable 5 milliGRAM(s) IV Push every 6 hours PRN agitation  melatonin 3 milliGRAM(s) Oral at bedtime PRN Insomnia  ondansetron Injectable 4 milliGRAM(s) IV Push every 8 hours PRN Nausea and/or Vomiting      ANTIBIOTICS:      All available historical data has been reviewed    ASSESSMENT  34y F admitted with Hyponatremia, hypo-osmolarity, or hypo-osmolar hyponatremia        PROBLEMS  
    LUIS BROWN  34y, Female  Allergy: Augmentin (Stomach Upset)  penicillin (Unknown)      CHIEF COMPLAINT: diarrhea and pain    SUBJECTIVE: Pt was hiding under covers and then was yelling and screaming during exam. GH attendant stated this was her baseline. Aide did not know meds or history to confirm details.     HPI:  33 y/o female with past medical history of autism, bipolar, diabetes, GERD, hyperlipidemia, hypertension, hypothyroidism presented for evaluation of diarrhea over the last 2 days and change in behavior which makes group home staff think that she might be in pain.  Patient is nonverbal.  No fevers or chills.  No vomiting.    Home meds obtained from [facility] from doc visit in March 2024.  Pt did not have paperwork from adult home available. (12 Apr 2024 21:02)    HPI:    FAMILY HISTORY:  FH: COPD (chronic obstructive pulmonary disease) (Father, Mother, Grandparent)    FH: HTN (hypertension) (Mother)    Family history of Raynaud's syndrome (Father)    Family history of von Willebrand disease (Grandparent)      PAST MEDICAL & SURGICAL HISTORY:  Autism      Hypothyroid      Diabetes      Bipolar mood disorder      Hypertension      History of constipation      Amenorrhea      Chronic kidney disease      H/O closed fracture of nasal bones      H/O constipation      GERD (gastroesophageal reflux disease)      H/O hiatal hernia      High cholesterol      PBA (pseudobulbar affect)      No significant past surgical history          SOCIAL HISTORY  Social History:      Home Medications:  ammonium lactate topical cream: Apply topically to affected area 2 times a day (12 Apr 2024 16:56)  divalproex sodium 250 mg oral delayed release tablet: 2 tab(s) orally once a day at 4 pm (12 Apr 2024 21:05)  divalproex sodium 500 mg oral delayed release tablet: 2 tab(s) orally once a day (at bedtime) (12 Apr 2024 21:05)  divalproex sodium 500 mg oral delayed release tablet: 1 tab(s) orally once a day at 7am (12 Apr 2024 16:49)  docusate 100mg PO 1 tab 2 times daily at 7am and 9pm:  (12 Apr 2024 16:56)  Farxiga 10 mg oral tablet: 1 tab(s) orally once a day (12 Apr 2024 16:56)  Januvia 50 mg oral tablet: 1 tab(s) orally once a day (12 Apr 2024 16:56)  OLANZapine 5 mg oral tablet: 1 tab(s) orally 2 times a day (12 Apr 2024 16:56)  pioglitazone 30 mg oral tablet: 1 tab(s) orally once a day (12 Apr 2024 21:05)  polyethylene glycol 3350 oral powder for reconstitution: 17 gram(s) orally 2 times a week Sunday and thursday at 9pm (12 Apr 2024 16:56)  pravastatin 40 mg oral tablet: 1 tab(s) orally once a day (12 Apr 2024 16:56)  propranolol 20 mg oral tablet: 1 tab(s) orally 2 times a day (12 Apr 2024 21:05)  Synthroid 75 mcg (0.075 mg) oral tablet: 1 tab(s) orally once a day (12 Apr 2024 21:05)  topiramate 100 mg oral tablet: 1 tab(s) orally 2 times a day at 7am and 9pm (12 Apr 2024 16:56)  Vitamin D3 125 mcg (5000 intl units) oral tablet: 1 tab(s) orally once a day (12 Apr 2024 16:56)      ROS  cannot answer due to being non verbal at baseline    VITALS:  T(F): 97.2, Max: 97.8 (04-12-24 @ 23:58)  HR: 62  BP: 114/67  RR: 18Vital Signs Last 24 Hrs  T(C): 36.2 (13 Apr 2024 20:50), Max: 36.6 (12 Apr 2024 23:58)  T(F): 97.2 (13 Apr 2024 20:50), Max: 97.8 (12 Apr 2024 23:58)  HR: 62 (13 Apr 2024 20:50) (60 - 72)  BP: 114/67 (13 Apr 2024 20:50) (114/67 - 120/81)  BP(mean): --  RR: 18 (13 Apr 2024 20:50) (17 - 18)  SpO2: 98% (13 Apr 2024 20:50) (98% - 99%)    Parameters below as of 13 Apr 2024 20:50  Patient On (Oxygen Delivery Method): room air        PHYSICAL EXAM:  Gen: NAD, resting in bed mixed with screaming  HEENT: Normocephalic, atraumatic  Neck: supple, no lymphadenopathy  CV: Regular rate & regular rhythm  Lungs: CTABL no wheeze  Abdomen: Soft, NTND+ BS present  Ext: Warm, well perfused no CCE  Skin: no rash, no erythema  Psych: developmentally delayed    TESTS & MEASUREMENTS:                        14.7   4.07  )-----------( 91       ( 13 Apr 2024 09:00 )             43.8     04-13    148<H>  |  116<H>  |  20  ----------------------------<  142<H>  4.4   |  23  |  1.6<H>    Ca    8.8      13 Apr 2024 09:00  Mg     2.0     04-12    TPro  6.6  /  Alb  3.8  /  TBili  0.2  /  DBili  x   /  AST  34  /  ALT  19  /  AlkPhos  82  04-13      LIVER FUNCTIONS - ( 13 Apr 2024 09:00 )  Alb: 3.8 g/dL / Pro: 6.6 g/dL / ALK PHOS: 82 U/L / ALT: 19 U/L / AST: 34 U/L / GGT: x           Urinalysis Basic - ( 13 Apr 2024 09:00 )    Color: x / Appearance: x / SG: x / pH: x  Gluc: 142 mg/dL / Ketone: x  / Bili: x / Urobili: x   Blood: x / Protein: x / Nitrite: x   Leuk Esterase: x / RBC: x / WBC x   Sq Epi: x / Non Sq Epi: x / Bacteria: x          Lactate, Blood: 0.7 mmol/L (04-12-24 @ 18:30)    QRS axis to [] ° and NSR at a rate of [] BPM. There was no atrial enlargement. There was no ventricular hypertrophy. There were no ST-T changes and all intervals were normal.      INFECTIOUS DISEASES TESTING      RADIOLOGY & ADDITIONAL TESTS:  I have personally reviewed the last Chest xray  CXR      CT  CT Abdomen and Pelvis No Cont:   ACC: [id number] EXAM:  CT ABDOMEN AND PELVIS   ORDERED BY: BLANKA MCCARTHY     PROCEDURE DATE:  04/12/2024          INTERPRETATION:  REASON FOR EXAM / CLINICAL STATEMENT:  Diarrhea. WBC _      PMHx of CKD (Cr2.1/GFR31)      TECHNIQUE:  Contiguous axial CT images were obtained from the diaphragms   through the pubic symphysis without IV contrast. Reformatted images in   the coronal and sagittal planes were acquired.      COMPARISON CT: None    OTHER STUDIES USED FOR CORRELATION: None.      TUBES AND LINES: None.    LOWER CHEST: The lung bases are clear. No pleural or pericardial effusion.    HEPATIC: The liver is normal in size with no evidence of solid mass or   bile duct dilatation.    BILIARY: No calcified gallstones are noted.    SPLEEN: Unremarkable.    PANCREAS: The pancreas is normal in size and configuration. No evidence   of mass or pancreatitis.    ADRENAL GLANDS: Unremarkable.    KIDNEYS: No evidence of hydronephrosis or calcified stones.    ABDOMINOPELVIC NODES: Unremarkable.    PELVIC ORGANS: No evidence of pelvic mass, lymphadenopathy, or fluid   collection.    BLADDER: Unremarkable.    PERITONEUM/MESENTERY/BOWEL: No evidence of bowel obstruction, colitis,   inflammatory process, or ascites. No pneumoperitoneum. The appendix is   unremarkable.    BONES/SOFT TISSUES: Mild degenerative changes of the spine are noted.    OTHER: Normal caliber aorta.    IMPRESSION:    No evidence of abdominal or pelvic mass or inflammatory process.    --- End of Report ---            VERONICA JONES MD; Attending Interventional Radiologist  This document has been electronically signed. Apr 12 2024  8:36PM (04-12-24 @ 19:49)      CARDIOLOGY TESTING      MEDICATIONS  (STANDING):  ammonium lactate 12% Lotion 1 Application(s) Topical every 12 hours  cholecalciferol 5000 Unit(s) Oral daily  dapagliflozin 10 milliGRAM(s) Oral every 24 hours  dextrose 10% Bolus 125 milliLiter(s) IV Bolus once  dextrose 5%. 1000 milliLiter(s) (50 mL/Hr) IV Continuous <Continuous>  dextrose 5%. 1000 milliLiter(s) (100 mL/Hr) IV Continuous <Continuous>  dextrose 50% Injectable 12.5 Gram(s) IV Push once  dextrose 50% Injectable 25 Gram(s) IV Push once  divalproex  milliGRAM(s) Oral every 12 hours  glucagon  Injectable 1 milliGRAM(s) IntraMuscular once  insulin lispro (ADMELOG) corrective regimen sliding scale   SubCutaneous three times a day before meals  levothyroxine 75 MICROGram(s) Oral daily  OLANZapine 5 milliGRAM(s) Oral two times a day  polyethylene glycol 3350 17 Gram(s) Oral <User Schedule>  sodium chloride 0.9%. 1000 milliLiter(s) (75 mL/Hr) IV Continuous <Continuous>  topiramate 100 milliGRAM(s) Oral every 12 hours    MEDICATIONS  (PRN):  acetaminophen     Tablet .. 650 milliGRAM(s) Oral every 6 hours PRN Temp greater or equal to 38C (100.4F), Mild Pain (1 - 3)  aluminum hydroxide/magnesium hydroxide/simethicone Suspension 30 milliLiter(s) Oral every 4 hours PRN Dyspepsia  dextrose Oral Gel 15 Gram(s) Oral once PRN Blood Glucose LESS THAN 70 milliGRAM(s)/deciliter  haloperidol    Injectable 5 milliGRAM(s) IV Push every 6 hours PRN agitation  melatonin 3 milliGRAM(s) Oral at bedtime PRN Insomnia  ondansetron Injectable 4 milliGRAM(s) IV Push every 8 hours PRN Nausea and/or Vomiting      ANTIBIOTICS:      All available historical data has been reviewed    ASSESSMENT  34y F admitted with Hyponatremia, hypo-osmolarity, or hypo-osmolar hyponatremia        PROBLEMS

## 2024-04-15 NOTE — CHART NOTE - NSCHARTNOTEFT_GEN_A_CORE
was noticed this am L foot 4th toe bruising  pt agitated overnight , received haldo     On exam:   L foot 4th toe bruising, pt agitated  unable to follow up command   no tenderness to palpation   L shin old abrasion     Plan:   xray L foot r/o toe fracture   will give haldo if pt agitated/uncooperative   dw attending
Consult received for "MST Score = />2." Upon chart review, patient with stable weight, does not currently meet criteria for Protein Calorie Malnutrition risk per MST. RD remains available for assessment per protocol or as needed.    Rena Sanchez MS, RDN  Spectra [phone] or via Teams

## 2024-04-16 ENCOUNTER — NON-APPOINTMENT (OUTPATIENT)
Age: 34
End: 2024-04-16

## 2024-04-16 ENCOUNTER — TRANSCRIPTION ENCOUNTER (OUTPATIENT)
Age: 34
End: 2024-04-16

## 2024-04-16 VITALS
HEART RATE: 78 BPM | TEMPERATURE: 97 F | OXYGEN SATURATION: 97 % | RESPIRATION RATE: 18 BRPM | DIASTOLIC BLOOD PRESSURE: 55 MMHG | SYSTOLIC BLOOD PRESSURE: 104 MMHG

## 2024-04-16 LAB
ALBUMIN SERPL ELPH-MCNC: 3.6 G/DL — SIGNIFICANT CHANGE UP (ref 3.5–5.2)
ALP SERPL-CCNC: 59 U/L — SIGNIFICANT CHANGE UP (ref 30–115)
ALT FLD-CCNC: 21 U/L — SIGNIFICANT CHANGE UP (ref 0–41)
ANION GAP SERPL CALC-SCNC: 11 MMOL/L — SIGNIFICANT CHANGE UP (ref 7–14)
AST SERPL-CCNC: 36 U/L — SIGNIFICANT CHANGE UP (ref 0–41)
BASOPHILS # BLD AUTO: 0.04 K/UL — SIGNIFICANT CHANGE UP (ref 0–0.2)
BASOPHILS NFR BLD AUTO: 0.9 % — SIGNIFICANT CHANGE UP (ref 0–1)
BILIRUB SERPL-MCNC: 0.4 MG/DL — SIGNIFICANT CHANGE UP (ref 0.2–1.2)
BUN SERPL-MCNC: 18 MG/DL — SIGNIFICANT CHANGE UP (ref 10–20)
CALCIUM SERPL-MCNC: 9 MG/DL — SIGNIFICANT CHANGE UP (ref 8.4–10.5)
CHLORIDE SERPL-SCNC: 107 MMOL/L — SIGNIFICANT CHANGE UP (ref 98–110)
CO2 SERPL-SCNC: 24 MMOL/L — SIGNIFICANT CHANGE UP (ref 17–32)
CREAT SERPL-MCNC: 1.7 MG/DL — HIGH (ref 0.7–1.5)
EGFR: 40 ML/MIN/1.73M2 — LOW
EOSINOPHIL # BLD AUTO: 0.05 K/UL — SIGNIFICANT CHANGE UP (ref 0–0.7)
EOSINOPHIL NFR BLD AUTO: 1.1 % — SIGNIFICANT CHANGE UP (ref 0–8)
GLUCOSE BLDC GLUCOMTR-MCNC: 121 MG/DL — HIGH (ref 70–99)
GLUCOSE SERPL-MCNC: 125 MG/DL — HIGH (ref 70–99)
HCT VFR BLD CALC: 39.7 % — SIGNIFICANT CHANGE UP (ref 37–47)
HGB BLD-MCNC: 13.5 G/DL — SIGNIFICANT CHANGE UP (ref 12–16)
IMM GRANULOCYTES NFR BLD AUTO: 1.3 % — HIGH (ref 0.1–0.3)
LYMPHOCYTES # BLD AUTO: 2.38 K/UL — SIGNIFICANT CHANGE UP (ref 1.2–3.4)
LYMPHOCYTES # BLD AUTO: 50.9 % — SIGNIFICANT CHANGE UP (ref 20.5–51.1)
MCHC RBC-ENTMCNC: 32.7 PG — HIGH (ref 27–31)
MCHC RBC-ENTMCNC: 34 G/DL — SIGNIFICANT CHANGE UP (ref 32–37)
MCV RBC AUTO: 96.1 FL — SIGNIFICANT CHANGE UP (ref 81–99)
MONOCYTES # BLD AUTO: 0.77 K/UL — HIGH (ref 0.1–0.6)
MONOCYTES NFR BLD AUTO: 16.5 % — HIGH (ref 1.7–9.3)
NEUTROPHILS # BLD AUTO: 1.38 K/UL — LOW (ref 1.4–6.5)
NEUTROPHILS NFR BLD AUTO: 29.3 % — LOW (ref 42.2–75.2)
NRBC # BLD: 0 /100 WBCS — SIGNIFICANT CHANGE UP (ref 0–0)
PLATELET # BLD AUTO: 108 K/UL — LOW (ref 130–400)
PMV BLD: 10 FL — SIGNIFICANT CHANGE UP (ref 7.4–10.4)
POTASSIUM SERPL-MCNC: 4.1 MMOL/L — SIGNIFICANT CHANGE UP (ref 3.5–5)
POTASSIUM SERPL-SCNC: 4.1 MMOL/L — SIGNIFICANT CHANGE UP (ref 3.5–5)
PROT SERPL-MCNC: 6.4 G/DL — SIGNIFICANT CHANGE UP (ref 6–8)
RBC # BLD: 4.13 M/UL — LOW (ref 4.2–5.4)
RBC # FLD: 13.2 % — SIGNIFICANT CHANGE UP (ref 11.5–14.5)
SODIUM SERPL-SCNC: 142 MMOL/L — SIGNIFICANT CHANGE UP (ref 135–146)
WBC # BLD: 4.68 K/UL — LOW (ref 4.8–10.8)
WBC # FLD AUTO: 4.68 K/UL — LOW (ref 4.8–10.8)

## 2024-04-16 PROCEDURE — 99239 HOSP IP/OBS DSCHRG MGMT >30: CPT

## 2024-04-16 RX ADMIN — Medication 1 APPLICATION(S): at 06:15

## 2024-04-16 RX ADMIN — OLANZAPINE 5 MILLIGRAM(S): 15 TABLET, FILM COATED ORAL at 05:13

## 2024-04-16 RX ADMIN — HALOPERIDOL DECANOATE 5 MILLIGRAM(S): 100 INJECTION INTRAMUSCULAR at 09:24

## 2024-04-16 RX ADMIN — DAPAGLIFLOZIN 10 MILLIGRAM(S): 10 TABLET, FILM COATED ORAL at 05:13

## 2024-04-16 RX ADMIN — Medication 100 MILLIGRAM(S): at 05:13

## 2024-04-16 RX ADMIN — DIVALPROEX SODIUM 500 MILLIGRAM(S): 500 TABLET, DELAYED RELEASE ORAL at 05:13

## 2024-04-16 RX ADMIN — Medication 75 MICROGRAM(S): at 05:13

## 2024-04-16 NOTE — DISCHARGE NOTE PROVIDER - NSDCMRMEDTOKEN_GEN_ALL_CORE_FT
ammonium lactate topical cream: Apply topically to affected area 2 times a day  divalproex sodium 250 mg oral delayed release tablet: 2 tab(s) orally once a day at 4 pm  divalproex sodium 500 mg oral delayed release tablet: 2 tab(s) orally once a day (at bedtime)  divalproex sodium 500 mg oral delayed release tablet: 1 tab(s) orally once a day at 7am  docusate 100mg PO 1 tab 2 times daily at 7am and 9pm:   Farxiga 10 mg oral tablet: 1 tab(s) orally once a day  Januvia 50 mg oral tablet: 1 tab(s) orally once a day  OLANZapine 5 mg oral tablet: 1 tab(s) orally 2 times a day  pioglitazone 30 mg oral tablet: 1 tab(s) orally once a day  polyethylene glycol 3350 oral powder for reconstitution: 17 gram(s) orally 2 times a week Sunday and thursday at 9pm  pravastatin 40 mg oral tablet: 1 tab(s) orally once a day  propranolol 20 mg oral tablet: 1 tab(s) orally 2 times a day  Synthroid 75 mcg (0.075 mg) oral tablet: 1 tab(s) orally once a day  topiramate 100 mg oral tablet: 1 tab(s) orally 2 times a day at 7am and 9pm  Vitamin D3 125 mcg (5000 intl units) oral tablet: 1 tab(s) orally once a day

## 2024-04-16 NOTE — DISCHARGE NOTE NURSING/CASE MANAGEMENT/SOCIAL WORK - PATIENT PORTAL LINK FT
You can access the FollowMyHealth Patient Portal offered by [facility] by registering at the following website: [email or web address]. By joining [employer]’s FollowMyHealth portal, you will also be able to view your health information using other applications (apps) compatible with our system.

## 2024-04-16 NOTE — DISCHARGE NOTE PROVIDER - ATTENDING DISCHARGE PHYSICAL EXAMINATION:
Gen: NAD, resting in bed mixed with screaming  HEENT: Normocephalic, atraumatic  Neck: supple, no lymphadenopathy  CV: Regular rate & regular rhythm  Lungs: CTABL no wheeze  Abdomen: Soft, NTND+ BS present  Ext: Warm, well perfused no CCE  Skin: no rash, no erythema  Psych: developmentally delayed

## 2024-04-16 NOTE — DISCHARGE NOTE PROVIDER - HOSPITAL COURSE
34-year-old female with past medical history of autism, bipolar, diabetes, GERD, hyperlipidemia, hypertension, hypothyroidism presented for evaluation of diarrhea over the last 2 days and change in behavior which makes group home staff think that she might be in pain.  Patient is nonverbal.  No fevers or chills.  No vomiting.    Hypovolemic  Hyponatremia, overcorrected to hypernatremia, now resolved    Acute Kidney Injury, likely has CKD  Creatinine stable 1.7, check BMP outpatient    Autism + Bipolar:  c/w home meds  Diarrhea:  no BM since admission: resolved   Toe bruise: Likely from episodes of Agitation,  X-Ray negative   Type II DM >.ISS as needed, maintain -180  Hypothyroid: Synthroid       Medically stable for dc to . Outpatient PMD follow up for renal function monitoring

## 2024-04-16 NOTE — DISCHARGE NOTE NURSING/CASE MANAGEMENT/SOCIAL WORK - NSDCPEFALRISK_GEN_ALL_CORE
For information on Fall & Injury Prevention, visit: [email or web address] OR  [email or web address] OR  https://www.cdc.gov/steadi/patient.html

## 2024-04-16 NOTE — DISCHARGE NOTE PROVIDER - NSDCCPCAREPLAN_GEN_ALL_CORE_FT
PRINCIPAL DISCHARGE DIAGNOSIS  Diagnosis: Hyponatremia  Assessment and Plan of Treatment: Hypovolemic Hyponatremia, overcorrected to hypernatremia, now resolved  encourage PO intake and hydration      SECONDARY DISCHARGE DIAGNOSES  Diagnosis: Diarrhea  Assessment and Plan of Treatment: resolved    Diagnosis: KIRA (acute kidney injury)  Assessment and Plan of Treatment: creatinine stable 1.7  check renal function in 1 week    Diagnosis: Autism  Assessment and Plan of Treatment: continue home meds    Diagnosis: Bipolar disorder  Assessment and Plan of Treatment: continue home meds

## 2024-04-16 NOTE — DISCHARGE NOTE PROVIDER - CARE PROVIDER_API CALL
Genia Rico  Internal Medicine  [address] [place] [address]  Phone: [phone]  Fax: [phone]  Follow Up Time: 1-3 days

## 2024-04-16 NOTE — DISCHARGE NOTE PROVIDER - NSDCFUSCHEDAPPT_GEN_ALL_CORE_FT
Breath sounds clear and equal bilaterally. Genia Rico  [facility]  Scheduled Appointment: 04/30/2024    Genia Rico  [facility]  ALFREDO  Glenn Barry  Scheduled Appointment: 04/30/2024    Acacia Andres  [facility]  ENDOCRIN [address]  Scheduled Appointment: 05/13/2024

## 2024-04-27 DIAGNOSIS — Z79.84 LONG TERM (CURRENT) USE OF ORAL HYPOGLYCEMIC DRUGS: ICD-10-CM

## 2024-04-27 DIAGNOSIS — E11.22 TYPE 2 DIABETES MELLITUS WITH DIABETIC CHRONIC KIDNEY DISEASE: ICD-10-CM

## 2024-04-27 DIAGNOSIS — Z79.890 HORMONE REPLACEMENT THERAPY: ICD-10-CM

## 2024-04-27 DIAGNOSIS — E86.1 HYPOVOLEMIA: ICD-10-CM

## 2024-04-27 DIAGNOSIS — I12.9 HYPERTENSIVE CHRONIC KIDNEY DISEASE WITH STAGE 1 THROUGH STAGE 4 CHRONIC KIDNEY DISEASE, OR UNSPECIFIED CHRONIC KIDNEY DISEASE: ICD-10-CM

## 2024-04-27 DIAGNOSIS — E87.0 HYPEROSMOLALITY AND HYPERNATREMIA: ICD-10-CM

## 2024-04-27 DIAGNOSIS — S90.122A CONTUSION OF LEFT LESSER TOE(S) WITHOUT DAMAGE TO NAIL, INITIAL ENCOUNTER: ICD-10-CM

## 2024-04-27 DIAGNOSIS — R19.7 DIARRHEA, UNSPECIFIED: ICD-10-CM

## 2024-04-27 DIAGNOSIS — K21.9 GASTRO-ESOPHAGEAL REFLUX DISEASE WITHOUT ESOPHAGITIS: ICD-10-CM

## 2024-04-27 DIAGNOSIS — F84.0 AUTISTIC DISORDER: ICD-10-CM

## 2024-04-27 DIAGNOSIS — E03.9 HYPOTHYROIDISM, UNSPECIFIED: ICD-10-CM
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
## 2024-04-27 DIAGNOSIS — K44.9 DIAPHRAGMATIC HERNIA WITHOUT OBSTRUCTION OR GANGRENE: ICD-10-CM

## 2024-04-27 DIAGNOSIS — Z79.899 OTHER LONG TERM (CURRENT) DRUG THERAPY: ICD-10-CM

## 2024-04-27 DIAGNOSIS — F31.9 BIPOLAR DISORDER, UNSPECIFIED: ICD-10-CM

## 2024-04-27 DIAGNOSIS — S80.812A ABRASION, LEFT LOWER LEG, INITIAL ENCOUNTER: ICD-10-CM

## 2024-04-27 DIAGNOSIS — N17.9 ACUTE KIDNEY FAILURE, UNSPECIFIED: ICD-10-CM

## 2024-04-27 DIAGNOSIS — E78.00 PURE HYPERCHOLESTEROLEMIA, UNSPECIFIED: ICD-10-CM

## 2024-04-27 DIAGNOSIS — F48.2 PSEUDOBULBAR AFFECT: ICD-10-CM

## 2024-04-27 DIAGNOSIS — N18.9 CHRONIC KIDNEY DISEASE, UNSPECIFIED: ICD-10-CM

## 2024-04-27 DIAGNOSIS — R45.1 RESTLESSNESS AND AGITATION: ICD-10-CM
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
## 2024-04-27 DIAGNOSIS — Z88.0 ALLERGY STATUS TO PENICILLIN: ICD-10-CM

## 2024-04-27 DIAGNOSIS — E87.1 HYPO-OSMOLALITY AND HYPONATREMIA: ICD-10-CM

## 2024-04-29 RX ORDER — DAPAGLIFLOZIN 10 MG/1
10 TABLET, FILM COATED ORAL
Refills: 0 | Status: ACTIVE | COMMUNITY

## 2024-04-29 RX ORDER — OLANZAPINE 5 MG/1
5 TABLET, FILM COATED ORAL
Refills: 0 | Status: ACTIVE | COMMUNITY
Start: 2023-08-15

## 2024-04-29 RX ORDER — DIVALPROEX SODIUM 500 MG/1
500 TABLET, DELAYED RELEASE ORAL
Refills: 0 | Status: ACTIVE | COMMUNITY

## 2024-04-29 RX ORDER — QUETIAPINE FUMARATE 50 MG/1
50 TABLET ORAL
Refills: 0 | Status: DISCONTINUED | COMMUNITY
End: 2024-04-29

## 2024-04-30 ENCOUNTER — APPOINTMENT (OUTPATIENT)
Dept: PEDIATRIC DEVELOPMENTAL SERVICES | Facility: CLINIC | Age: 34
End: 2024-04-30
Payer: COMMERCIAL

## 2024-04-30 ENCOUNTER — OUTPATIENT (OUTPATIENT)
Dept: OUTPATIENT SERVICES | Facility: HOSPITAL | Age: 34
LOS: 1 days | End: 2024-04-30
Payer: COMMERCIAL

## 2024-04-30 VITALS
DIASTOLIC BLOOD PRESSURE: 80 MMHG | HEIGHT: 63 IN | OXYGEN SATURATION: 97 % | WEIGHT: 152.56 LBS | BODY MASS INDEX: 27.03 KG/M2 | SYSTOLIC BLOOD PRESSURE: 116 MMHG | HEART RATE: 78 BPM | TEMPERATURE: 97.2 F

## 2024-04-30 DIAGNOSIS — E11.9 TYPE 2 DIABETES MELLITUS WITHOUT COMPLICATIONS: ICD-10-CM

## 2024-04-30 DIAGNOSIS — E78.00 PURE HYPERCHOLESTEROLEMIA, UNSPECIFIED: ICD-10-CM

## 2024-04-30 DIAGNOSIS — R79.89 OTHER SPECIFIED ABNORMAL FINDINGS OF BLOOD CHEMISTRY: ICD-10-CM

## 2024-04-30 DIAGNOSIS — E87.1 HYPO-OSMOLALITY AND HYPONATREMIA: ICD-10-CM

## 2024-04-30 DIAGNOSIS — N18.9 CHRONIC KIDNEY DISEASE, UNSPECIFIED: ICD-10-CM

## 2024-04-30 DIAGNOSIS — K59.00 CONSTIPATION, UNSPECIFIED: ICD-10-CM

## 2024-04-30 DIAGNOSIS — R19.7 DIARRHEA, UNSPECIFIED: ICD-10-CM

## 2024-04-30 DIAGNOSIS — Z00.00 ENCOUNTER FOR GENERAL ADULT MEDICAL EXAMINATION WITHOUT ABNORMAL FINDINGS: ICD-10-CM

## 2024-04-30 DIAGNOSIS — E03.9 HYPOTHYROIDISM, UNSPECIFIED: ICD-10-CM

## 2024-04-30 DIAGNOSIS — N20.0 CALCULUS OF KIDNEY: ICD-10-CM

## 2024-04-30 PROCEDURE — G2211 COMPLEX E/M VISIT ADD ON: CPT

## 2024-04-30 PROCEDURE — 99214 OFFICE O/P EST MOD 30 MIN: CPT | Mod: GC

## 2024-04-30 PROCEDURE — 99214 OFFICE O/P EST MOD 30 MIN: CPT

## 2024-04-30 RX ORDER — POLYETHYLENE GLYCOL 3350 17 G/17G
17 POWDER, FOR SOLUTION ORAL DAILY
Qty: 2 | Refills: 3 | Status: DISCONTINUED | COMMUNITY
Start: 2017-08-08 | End: 2024-04-30

## 2024-04-30 RX ORDER — ADHESIVE TAPE 3"X 2.3 YD
50 MCG TAPE, NON-MEDICATED TOPICAL
Qty: 30 | Refills: 5 | Status: ACTIVE | COMMUNITY
Start: 2020-11-02 | End: 1900-01-01

## 2024-04-30 RX ORDER — LEVOTHYROXINE SODIUM 0.07 MG/1
75 TABLET ORAL DAILY
Qty: 30 | Refills: 6 | Status: ACTIVE | COMMUNITY
Start: 2021-01-13 | End: 1900-01-01

## 2024-04-30 RX ORDER — PRAVASTATIN SODIUM 40 MG/1
40 TABLET ORAL
Qty: 30 | Refills: 3 | Status: ACTIVE | COMMUNITY
Start: 2017-07-27 | End: 1900-01-01

## 2024-04-30 NOTE — PHYSICAL EXAM
[No Acute Distress] : no acute distress [No JVD] : no jugular venous distention [Normal] : normal rate, regular rhythm, normal S1 and S2 and no murmur heard [Soft] : abdomen soft [Non Tender] : non-tender

## 2024-04-30 NOTE — HISTORY OF PRESENT ILLNESS
[FreeTextEntry1] : Routine follow up [de-identified] : This is a 34-year-old female with autism, T2DM, HLD, hypothyroidism, CKD 3 here for follow-up. Accompanied by staff Crystal. Patient is nonverbal at baseline. Per staff no new issues or complaints. States here for routine follow up.

## 2024-04-30 NOTE — END OF VISIT
[] : Resident [FreeTextEntry3] : Pt. was recently hospitalized for hyponatremia, Na 142 on day of discharged; here for follow up.  Staff reports pt. has had diarrhea for the past 1 month, will d/c colace and miralax.  Advised no dairy product until diarrhea resolved.  If persist after colace, miralax stopped, will consider GI referral.  Had Tdap 6/28/17, flu vaccine 12/11/23.  Seen Gyn 5/18/23, benefit of performing yearly exams is outweigh by patient discomfort; follow up prn.  Medication renewed.  Blood work ordered.  Follow neuro 5/1/24 for pseudobulbar affect, endo 5/13/24 for NIDDM/hypothyroidism, renal 7/16/24 for CKD-3b.  RTC 3 months.

## 2024-04-30 NOTE — ASSESSMENT
[FreeTextEntry1] : #Hyponatremia - Na 142 on day of discharged - resolved - blood work ordered to follow Na level  # Autism # Mood disorder - Constant behavioral disturbances - on Divalproex, Lorazepam, Nuedexta, Olanzapine, Topiramate, Propranolol - patient might benefit from  follow up for behavioral disturbances - F/U with neurology 1st May  #hx of kidney stones #CKD - Cr 1.8 in Dec 2023 stable - RBUS 7/23 shows non obstructive 4mm right renal stone and left lower renal pole cyst ~1.4 cm(no complex features described on US) - Encouraged adequate hydration ~60 ounces. Educated on low salt diet, low glycemic index diet, and medication compliance - Followed up with urology, no intervention and fu prn - RTC 6 months, repeat labs, UA, Renal US, if stones remain will send to urology for evaluation. - Follows up with nephrology  # Hypothyroidism - TSH 3.14 in Dec 2023 - c/w Levothyroxine 75 mcg  # Vitamin D deficiency - Level 19.4 in Oct 2023 - cw Vit D supplementation  # Diarrhea - Stopped Colace and Miralax - C/W high fiber diet - If diarrhea does not resolve can follow up with GI  #DM-2 - HbA1c 6.2 12/2023 - c/w Januvia, Pioglitazone - Farxiga 10 mg started by Endo - F/U with endo 13th May - seen by ophthalmology for retinopathy eval (1/2023), recommended annual evaluation  # HLD - lipid profile 12/2024 wnl - c/w Pravastatin 40 mg  #Hyperprolactinemia - likely medication induced - seen by OBGYN and Endocrinology- likely due to being on multiple psych meds, no further workup advised  # HCM - Covid vaccine: pt has received 2 shots of Covid vaccine + Covid booster in 1st week of Jan 2022 - Flu vaccine: Given 12/11/23 - Gyn appointment 5/18/23; uncooperative for annual exam, recommended that benefit of performing yearly exams is outweighed by patient discomfort, only bring patient back if any issues arise - medication renewed - RTC 3 months. Encouraged staff to follow up with nephrology, endocrinology. ophthalmology. neurology.

## 2024-04-30 NOTE — REVIEW OF SYSTEMS
[Diarrhea] : diarrhea [Fever] : no fever [Chest Pain] : no chest pain [Shortness Of Breath] : no shortness of breath [Abdominal Pain] : no abdominal pain [Dysuria] : no dysuria [Incontinence] : no incontinence

## 2024-05-01 ENCOUNTER — APPOINTMENT (OUTPATIENT)
Dept: NEUROLOGY | Facility: CLINIC | Age: 34
End: 2024-05-01
Payer: COMMERCIAL

## 2024-05-01 VITALS — BODY MASS INDEX: 26.93 KG/M2 | HEIGHT: 63 IN | WEIGHT: 152 LBS

## 2024-05-01 DIAGNOSIS — F48.2 PSEUDOBULBAR AFFECT: ICD-10-CM

## 2024-05-01 PROCEDURE — 99214 OFFICE O/P EST MOD 30 MIN: CPT

## 2024-05-01 RX ORDER — DEXTROMETHORPHAN HYDROBROMIDE AND QUINIDINE SULFATE 20; 10 MG/1; MG/1
20-10 CAPSULE, GELATIN COATED ORAL
Qty: 60 | Refills: 5 | Status: ACTIVE | COMMUNITY
Start: 1900-01-01 | End: 1900-01-01

## 2024-05-01 NOTE — ASSESSMENT
[FreeTextEntry1] : 34 year old non verbal female with pseudobulbar affect, Autism, and bipolar disorder. Patient was previously under the care of RADHA Frank on a regimen of  Nuedexta 20/10 BID which she will continue as is without change. Patient will return to the office for f/u in 6 months for re evaluation. If there is any issue the patient's aide is aware she should contact the office.   Supervising Physician : Nikunj Santos MD

## 2024-05-01 NOTE — HISTORY OF PRESENT ILLNESS
[FreeTextEntry1] : Ms. Martinez is a 34-year-old right-handed autistic woman initially presented with her mother as well as grandmother who provided most of the history. The patient was previously seen by Dr. Page many years ago, last seen in 2001 for compulsive-type symptoms. She was treated with Inderal and Prozac. The patient previously had a normal EEG and MRI revealing some mild cerebral atrophy. She was evaluated at [facility] many years ago. She has been followed at [facility]. Her condition has been fairly stable. She was prescribed Risperdal, Depakote, lorazepam at night, lithium and Inderal for many years. Mother states that she gets manic and aggressive, very hyper often. However, her last psychiatrist started her on Nuedexta two years ago and almost immediately they noticed improvement of her mood. She was much more calm. Her behavior improved tremendously and this was noted even in her day program. Unfortunately, her previous psychiatrist left and she is now seeing a new psychiatrist who does not feel comfortable prescribing Nuedexta for Marilia.  TODAY:  I had the pleasure of seeing Marilia accompanied by her aide today in follow up. Her previous history and physical findings have been reviewed.   Ms. Martinez remains under our care for pseudobulbar affect and is also under care of a psychiatrist for severe behavioral disorder as well as a bipolar disorder. Patient has been under the care of RADHA Frank as an adult, previously a patient of Dr. Page. Her health aide speaks for her as she is largely non verbal. She states nothing has changed over the past several months with respect to her condition.  She is managed on a regimen of Nuedexta 20/10 1 capsule twice daily however, her aide states her aggression and agitation continues.  She does see a psychiatrist regularly and is managed on Depakote, Topamax, and Sertraline, however, it does not seem to be effective.

## 2024-05-09 ENCOUNTER — OUTPATIENT (OUTPATIENT)
Dept: OUTPATIENT SERVICES | Facility: HOSPITAL | Age: 34
LOS: 1 days | End: 2024-05-09
Payer: COMMERCIAL

## 2024-05-09 ENCOUNTER — EMERGENCY (EMERGENCY)
Facility: HOSPITAL | Age: 34
LOS: 0 days | Discharge: HOME | End: 2024-05-09
Attending: EMERGENCY MEDICINE
Payer: COMMERCIAL

## 2024-05-09 VITALS
HEIGHT: 66 IN | SYSTOLIC BLOOD PRESSURE: 108 MMHG | DIASTOLIC BLOOD PRESSURE: 72 MMHG | OXYGEN SATURATION: 97 % | RESPIRATION RATE: 18 BRPM | TEMPERATURE: 97 F | HEART RATE: 73 BPM

## 2024-05-09 DIAGNOSIS — E78.5 HYPERLIPIDEMIA, UNSPECIFIED: ICD-10-CM

## 2024-05-09 DIAGNOSIS — F79 UNSPECIFIED INTELLECTUAL DISABILITIES: ICD-10-CM

## 2024-05-09 DIAGNOSIS — Z00.00 ENCOUNTER FOR GENERAL ADULT MEDICAL EXAMINATION WITHOUT ABNORMAL FINDINGS: ICD-10-CM

## 2024-05-09 DIAGNOSIS — Z88.0 ALLERGY STATUS TO PENICILLIN: ICD-10-CM

## 2024-05-09 DIAGNOSIS — I10 ESSENTIAL (PRIMARY) HYPERTENSION: ICD-10-CM

## 2024-05-09 DIAGNOSIS — R73.9 HYPERGLYCEMIA, UNSPECIFIED: ICD-10-CM

## 2024-05-09 DIAGNOSIS — E11.65 TYPE 2 DIABETES MELLITUS WITH HYPERGLYCEMIA: ICD-10-CM

## 2024-05-09 DIAGNOSIS — E03.9 HYPOTHYROIDISM, UNSPECIFIED: ICD-10-CM

## 2024-05-09 LAB
25(OH)D3 SERPL-MCNC: 76 NG/ML
ALBUMIN SERPL ELPH-MCNC: 4 G/DL
ALP BLD-CCNC: 55 U/L
ALT SERPL-CCNC: 18 U/L
ANION GAP SERPL CALC-SCNC: 13 MMOL/L
AST SERPL-CCNC: 37 U/L
BASOPHILS # BLD AUTO: 0.02 K/UL
BASOPHILS NFR BLD AUTO: 0.5 %
BILIRUB SERPL-MCNC: 0.4 MG/DL
BUN SERPL-MCNC: 20 MG/DL
CALCIUM SERPL-MCNC: 9.3 MG/DL
CHLORIDE SERPL-SCNC: 109 MMOL/L
CHOLEST SERPL-MCNC: 173 MG/DL
CO2 SERPL-SCNC: 24 MMOL/L
CREAT SERPL-MCNC: 1.9 MG/DL
EGFR: 35 ML/MIN/1.73M2
EOSINOPHIL # BLD AUTO: 0.03 K/UL
EOSINOPHIL NFR BLD AUTO: 0.8 %
ESTIMATED AVERAGE GLUCOSE: 126 MG/DL
GLUCOSE BLDC GLUCOMTR-MCNC: 121 MG/DL — HIGH (ref 70–99)
GLUCOSE SERPL-MCNC: 113 MG/DL
HBA1C MFR BLD HPLC: 6 %
HCT VFR BLD CALC: 48.2 %
HDLC SERPL-MCNC: 44 MG/DL
HGB BLD-MCNC: 15.1 G/DL
IMM GRANULOCYTES NFR BLD AUTO: 0.8 %
LDLC SERPL CALC-MCNC: 98 MG/DL
LYMPHOCYTES # BLD AUTO: 2.09 K/UL
LYMPHOCYTES NFR BLD AUTO: 56.9 %
MAN DIFF?: NORMAL
MCHC RBC-ENTMCNC: 31.3 G/DL
MCHC RBC-ENTMCNC: 31.9 PG
MCV RBC AUTO: 101.9 FL
MONOCYTES # BLD AUTO: 0.65 K/UL
MONOCYTES NFR BLD AUTO: 17.7 %
NEUTROPHILS # BLD AUTO: 0.85 K/UL
NEUTROPHILS NFR BLD AUTO: 23.3 %
NONHDLC SERPL-MCNC: 129 MG/DL
PLATELET # BLD AUTO: 93 K/UL
PMV BLD AUTO: 0 /100 WBCS
POTASSIUM SERPL-SCNC: 4.6 MMOL/L
PROT SERPL-MCNC: 6.9 G/DL
RBC # BLD: 4.73 M/UL
RBC # FLD: 14.5 %
SODIUM SERPL-SCNC: 146 MMOL/L
TRIGL SERPL-MCNC: 154 MG/DL
TSH SERPL-ACNC: 0.59 UIU/ML
WBC # FLD AUTO: 3.67 K/UL

## 2024-05-09 PROCEDURE — 80053 COMPREHEN METABOLIC PANEL: CPT

## 2024-05-09 PROCEDURE — 82962 GLUCOSE BLOOD TEST: CPT

## 2024-05-09 PROCEDURE — 80061 LIPID PANEL: CPT

## 2024-05-09 PROCEDURE — 85027 COMPLETE CBC AUTOMATED: CPT

## 2024-05-09 PROCEDURE — 36415 COLL VENOUS BLD VENIPUNCTURE: CPT

## 2024-05-09 PROCEDURE — 99283 EMERGENCY DEPT VISIT LOW MDM: CPT

## 2024-05-09 PROCEDURE — 83036 HEMOGLOBIN GLYCOSYLATED A1C: CPT

## 2024-05-09 PROCEDURE — 82306 VITAMIN D 25 HYDROXY: CPT

## 2024-05-09 PROCEDURE — 84443 ASSAY THYROID STIM HORMONE: CPT

## 2024-05-09 PROCEDURE — 99282 EMERGENCY DEPT VISIT SF MDM: CPT

## 2024-05-09 RX ORDER — POLYETHYLENE GLYCOL 3350 17 G/17G
17 POWDER, FOR SOLUTION ORAL
Refills: 0 | DISCHARGE

## 2024-05-09 RX ORDER — SOD,AMMONIUM,POTASSIUM LACTATE
1 CREAM (GRAM) TOPICAL
Qty: 0 | Refills: 0 | DISCHARGE

## 2024-05-09 RX ORDER — OLANZAPINE 15 MG/1
1 TABLET, FILM COATED ORAL
Refills: 0 | DISCHARGE

## 2024-05-09 RX ORDER — PIOGLITAZONE HYDROCHLORIDE 15 MG/1
1 TABLET ORAL
Refills: 0 | DISCHARGE

## 2024-05-09 RX ORDER — CHOLECALCIFEROL (VITAMIN D3) 125 MCG
1 CAPSULE ORAL
Refills: 0 | DISCHARGE

## 2024-05-09 RX ORDER — DIVALPROEX SODIUM 500 MG/1
2 TABLET, DELAYED RELEASE ORAL
Refills: 0 | DISCHARGE

## 2024-05-09 RX ORDER — SITAGLIPTIN 50 MG/1
1 TABLET, FILM COATED ORAL
Refills: 0 | DISCHARGE

## 2024-05-09 RX ORDER — DAPAGLIFLOZIN 10 MG/1
1 TABLET, FILM COATED ORAL
Refills: 0 | DISCHARGE

## 2024-05-09 RX ORDER — DIVALPROEX SODIUM 500 MG/1
1 TABLET, DELAYED RELEASE ORAL
Refills: 0 | DISCHARGE

## 2024-05-09 RX ORDER — LEVOTHYROXINE SODIUM 125 MCG
1 TABLET ORAL
Refills: 0 | DISCHARGE

## 2024-05-09 RX ORDER — TOPIRAMATE 25 MG
1 TABLET ORAL
Refills: 0 | DISCHARGE

## 2024-05-09 RX ORDER — PROPRANOLOL HCL 160 MG
1 CAPSULE, EXTENDED RELEASE 24HR ORAL
Refills: 0 | DISCHARGE

## 2024-05-09 NOTE — ED PROVIDER NOTE - ATTENDING APP SHARED VISIT CONTRIBUTION OF CARE
Patient with history of diabetes noted to have elevated blood sugar just after eating dinner.  She was sent to the ER for further evaluation.  Otherwise there is no vomiting change in mental status abdominal pain or fever.  No increase in urination.    .  Or thirst.  Fingerstick was rechecked here and normal.  Exam at baseline.  Patient to be discharged.

## 2024-05-09 NOTE — ED PROVIDER NOTE - CLINICAL SUMMARY MEDICAL DECISION MAKING FREE TEXT BOX
Patient evaluated for hyperglycemia repeat fingerstick normal patient to follow-up with primary care doctor for further glucose monitoring

## 2024-05-09 NOTE — ED PROVIDER NOTE - PATIENT PORTAL LINK FT
You can access the FollowMyHealth Patient Portal offered by [facility] by registering at the following website: [email or web address]. By joining [employer]’s FollowMyHealth portal, you will also be able to view your health information using other applications (apps) compatible with our system.

## 2024-05-09 NOTE — ED PROVIDER NOTE - NSFOLLOWUPINSTRUCTIONS_ED_ALL_ED_FT
FOLLOW UP WITH YOUR PRIMARY DOCTOR FOR EVALUATION OF YOUR HYPERGLYCEMIA.     Medical Screening Exam  A medical screening exam helps determine whether or not you need emergency medical treatment.    During the medical screening exam, a health care provider does a short physical exam and medical history to assess:    Your current symptoms.  Your overall health.    Depending on your symptoms, you may need additional tests.    What are the possible outcomes of a medical screening exam?  Your medical screening exam may determine that:    You do not need emergency treatment at this time.  You need treatment right away.  You need to be transferred to another medical center.    When should I seek medical care?  If you have a regular health care provider, make an appointment for a follow-up visit with him or her. If you do not have a regular health care provider, ask about resources in your community.    Get help right away if:  Your condition may change over time. If your condition gets worse or you develop new or troubling symptoms before you see your health care provider, go to an emergency department right away.    In an emergency:     Call 911 or have someone drive you to the nearest hospital.  Do not drive yourself.  This information is not intended to replace advice given to you by your health care provider. Make sure you discuss any questions you have with your health care provider.

## 2024-05-09 NOTE — ED PROVIDER NOTE - PHYSICAL EXAMINATION
As Follows:  CONST: Well appearing in NAD  EYES: PERRL, EOMI, Sclera and conjunctiva clear.   CARD: No murmurs, rubs, or gallops; Normal rate and rhythm  RESP: No distress or accessory breathing  GI: Soft, non-tender, non-distended.  SKIN: Warm, dry, no acute rashes. MMM  NEURO: Intellectual disability. No focal deficits. Strength and sensation intact. Spontaneously moving extremities.

## 2024-05-09 NOTE — ED PROVIDER NOTE - OBJECTIVE STATEMENT
Patient is a 34-year-old female with past medical history of intellectual disability, diabetes, hypertension, hyperlipidemia, hypothyroidism presents for hypoglycemia.  Patient has been generally sleepy throughout most of the day but went to normal group activities and returned back around 4 PM.  She had a snack at that time and missed first dinner so she had dinner around 7 PM.  Her glucose taken between 830 and 9 PM was in the 270s so they brought her in for evaluation.  They deny any nausea, vomiting, fever, chills, abdominal pain, changes to behavior.

## 2024-05-10 DIAGNOSIS — Z00.00 ENCOUNTER FOR GENERAL ADULT MEDICAL EXAMINATION WITHOUT ABNORMAL FINDINGS: ICD-10-CM

## 2024-05-13 ENCOUNTER — APPOINTMENT (OUTPATIENT)
Dept: ENDOCRINOLOGY | Facility: CLINIC | Age: 34
End: 2024-05-13
Payer: COMMERCIAL

## 2024-05-13 VITALS
HEIGHT: 63 IN | WEIGHT: 157 LBS | DIASTOLIC BLOOD PRESSURE: 78 MMHG | BODY MASS INDEX: 27.82 KG/M2 | SYSTOLIC BLOOD PRESSURE: 112 MMHG

## 2024-05-13 DIAGNOSIS — E03.9 HYPOTHYROIDISM, UNSPECIFIED: ICD-10-CM

## 2024-05-13 DIAGNOSIS — R79.89 OTHER SPECIFIED ABNORMAL FINDINGS OF BLOOD CHEMISTRY: ICD-10-CM

## 2024-05-13 DIAGNOSIS — E66.9 OBESITY, UNSPECIFIED: ICD-10-CM

## 2024-05-13 DIAGNOSIS — F84.0 AUTISTIC DISORDER: ICD-10-CM

## 2024-05-13 DIAGNOSIS — E11.9 TYPE 2 DIABETES MELLITUS W/OUT COMPLICATIONS: ICD-10-CM

## 2024-05-13 DIAGNOSIS — N18.9 CHRONIC KIDNEY DISEASE, UNSPECIFIED: ICD-10-CM

## 2024-05-13 PROCEDURE — 99214 OFFICE O/P EST MOD 30 MIN: CPT

## 2024-05-14 PROBLEM — R79.89 ELEVATED TESTOSTERONE LEVEL IN FEMALE: Status: ACTIVE | Noted: 2024-05-14

## 2024-05-14 PROBLEM — R79.89 ELEVATED PROLACTIN LEVEL: Status: ACTIVE | Noted: 2024-05-14

## 2024-05-23 PROBLEM — R79.89 ELEVATED MORNING SERUM CORTISOL LEVEL: Status: ACTIVE | Noted: 2024-05-23

## 2024-05-23 RX ORDER — DEXAMETHASONE 1 MG/1
1 TABLET ORAL DAILY
Qty: 1 | Refills: 0 | Status: ACTIVE | COMMUNITY
Start: 2024-05-23 | End: 1900-01-01

## 2024-05-30 NOTE — HISTORY OF PRESENT ILLNESS
[Finger Stick] : Finger Stick: Yes [FreeTextEntry1] : Patient came today with 2 care takers, she is constantly screaming, "Daddy". She is irritable and kicking constantly. She came today for follow up. Labs reviewed. Hgba1c 6.0, TSH 0.59. Review old labs noted she has elevated Testosterone and Prolactin Level. Patient has no breast discharge as reported by Nurse Blackwell. Discussed probable elevated Prolactin due to her psychiatric medications but will repeat labs and evaluate prolactin level.   Addendum: 5/23/24 will do Dexamethasone suppression test, will order 1mg to take at 11 pm and to do lab cortisol by 8 AM. Telephone call the Nurse lBackwell, that lab orders will be emailed or can be picked up in office. She stated patient will be moving back with her mother by June 2, 2024.

## 2024-05-30 NOTE — THERAPY
[Today's Date] : [unfilled] [BG Target = ____] : BG Target = [unfilled] [FreeTextEntry7] : farxiga 10 mg , januvia 50mg, piglitazone 30 mg

## 2024-05-30 NOTE — PHYSICAL EXAM
[Alert] : alert [Well Nourished] : well nourished [Healthy Appearance] : healthy appearance [No Acute Distress] : no acute distress [Well Developed] : well developed [Normal Sclera/Conjunctiva] : normal sclera/conjunctiva [EOMI] : extra ocular movement intact [PERRL] : pupils equal, round and reactive to light [No Proptosis] : no proptosis [Normal Outer Ear/Nose] : the ears and nose were normal in appearance [Normal Hearing] : hearing was normal [Supple] : the neck was supple [Thyroid Not Enlarged] : the thyroid was not enlarged [No Respiratory Distress] : no respiratory distress [No Accessory Muscle Use] : no accessory muscle use [Normal Rate and Effort] : normal respiratory rate and effort [Clear to Auscultation] : lungs were clear to auscultation bilaterally [Normal S1, S2] : normal S1 and S2 [Normal Rate] : heart rate was normal [Regular Rhythm] : with a regular rhythm [Carotids Normal] : carotid pulses were normal with no bruits [No Edema] : no peripheral edema [Pedal Pulses Normal] : the pedal pulses are present [Normal Bowel Sounds] : normal bowel sounds [Not Tender] : non-tender [Not Distended] : not distended [Soft] : abdomen soft [Normal Anterior Cervical Nodes] : no anterior cervical lymphadenopathy [No CVA Tenderness] : no ~M costovertebral angle tenderness [No Spinal Tenderness] : no spinal tenderness [Spine Straight] : spine straight [No Stigmata of Cushings Syndrome] : no stigmata of Cushings Syndrome [Normal Gait] : normal gait [Normal Strength/Tone] : muscle strength and tone were normal [No Rash] : no rash [Acanthosis Nigricans] : no acanthosis nigricans [Foot Ulcers] : no foot ulcers [Delayed in the Right Toes] : normal in the toes [Delayed in the Left Toes] : normal in the toes [de-identified] :  not COOPERATIVE, DOES NOT TALK, AUTISM, screaming, seems agitated today [de-identified] : DM2, hypothyroid, elevated prolactin, elevated testosterone [de-identified] : pt. agitated, screaming and kicking [de-identified] : AUTISM, NON VERBAL, agitated and kicking

## 2024-05-30 NOTE — REVIEW OF SYSTEMS
[Recent Weight Gain (___ Lbs)] : recent weight gain: [unfilled] lbs [Negative] : Heme/Lymph [de-identified] : Patient screaming and kicking. agitated.

## 2024-05-30 NOTE — HISTORY OF PRESENT ILLNESS
[Finger Stick] : Finger Stick: Yes [FreeTextEntry1] : Patient came today with 2 care takers, she is constantly screaming, "Daddy". She is irritable and kicking constantly. She came today for follow up. Labs reviewed. Hgba1c 6.0, TSH 0.59. Review old labs noted she has elevated Testosterone and Prolactin Level. Patient has no breast discharge as reported by Nurse Blackwell. Discussed probable elevated Prolactin due to her psychiatric medications but will repeat labs and evaluate prolactin level.   Addendum: 5/23/24 will do Dexamethasone suppression test, will order 1mg to take at 11 pm and to do lab cortisol by 8 AM. Telephone call the Nurse Blackwell, that lab orders will be emailed or can be picked up in office. She stated patient will be moving back with her mother by June 2, 2024.

## 2024-05-30 NOTE — ASSESSMENT
[Diabetes Foot Care] : diabetes foot care [Long Term Vascular Complications] : long term vascular complications of diabetes [Carbohydrate Consistent Diet] : carbohydrate consistent diet [Importance of Diet and Exercise] : importance of diet and exercise to improve glycemic control, achieve weight loss and improve cardiovascular health [Exercise/Effect on Glucose] : exercise/effect on glucose [Hypoglycemia Management] : hypoglycemia management [Ketone Testing] : ketone testing [Self Monitoring of Blood Glucose] : self monitoring of blood glucose [Sick-Day Management] : sick-day management [Retinopathy Screening] : Patient was referred to ophthalmology for retinopathy screening [Diabetic Medications] : Risks and benefits of diabetic medications were discussed [Levothyroxine] : The patient was instructed to take Levothyroxine on an empty stomach, separate from vitamins, and wait at least 30 minutes before eating [FreeTextEntry4] : [address] diet, exercise [FreeTextEntry1] : 1. DM2 DIET AND EXERCISE Farxiga 10mg daily Januvia 50mg daily Pioglitazone 30 mg daily  2. Hypothyroid Levothyroxine 75 mcg daily  3. elevated Testosterone Pt. seen GYN Will monitor testosterone levels  4. Elevated Prolactin level Will repeat prolactin/ macro prolactin level Discussed psychiatric medications can cause elevated prolactin. Refer for MRI w/ WO contrast,  if prolactin level is elevated.

## 2024-05-30 NOTE — PHYSICAL EXAM
[Alert] : alert [Well Nourished] : well nourished [Healthy Appearance] : healthy appearance [No Acute Distress] : no acute distress [Well Developed] : well developed [Normal Sclera/Conjunctiva] : normal sclera/conjunctiva [EOMI] : extra ocular movement intact [PERRL] : pupils equal, round and reactive to light [No Proptosis] : no proptosis [Normal Outer Ear/Nose] : the ears and nose were normal in appearance [Normal Hearing] : hearing was normal [Supple] : the neck was supple [Thyroid Not Enlarged] : the thyroid was not enlarged [No Respiratory Distress] : no respiratory distress [No Accessory Muscle Use] : no accessory muscle use [Normal Rate and Effort] : normal respiratory rate and effort [Clear to Auscultation] : lungs were clear to auscultation bilaterally [Normal S1, S2] : normal S1 and S2 [Normal Rate] : heart rate was normal [Regular Rhythm] : with a regular rhythm [Carotids Normal] : carotid pulses were normal with no bruits [No Edema] : no peripheral edema [Pedal Pulses Normal] : the pedal pulses are present [Normal Bowel Sounds] : normal bowel sounds [Not Tender] : non-tender [Not Distended] : not distended [Soft] : abdomen soft [Normal Anterior Cervical Nodes] : no anterior cervical lymphadenopathy [No CVA Tenderness] : no ~M costovertebral angle tenderness [No Spinal Tenderness] : no spinal tenderness [Spine Straight] : spine straight [No Stigmata of Cushings Syndrome] : no stigmata of Cushings Syndrome [Normal Gait] : normal gait [Normal Strength/Tone] : muscle strength and tone were normal [No Rash] : no rash [Acanthosis Nigricans] : no acanthosis nigricans [Foot Ulcers] : no foot ulcers [Delayed in the Right Toes] : normal in the toes [Delayed in the Left Toes] : normal in the toes [de-identified] :  not COOPERATIVE, DOES NOT TALK, AUTISM, screaming, seems agitated today [de-identified] : DM2, hypothyroid, elevated prolactin, elevated testosterone [de-identified] : pt. agitated, screaming and kicking [de-identified] : AUTISM, NON VERBAL, agitated and kicking

## 2024-05-30 NOTE — REVIEW OF SYSTEMS
[Recent Weight Gain (___ Lbs)] : recent weight gain: [unfilled] lbs [Negative] : Heme/Lymph [de-identified] : Patient screaming and kicking. agitated.

## 2024-05-31 LAB
ALBUMIN SERPL ELPH-MCNC: 3.8 G/DL
ALP BLD-CCNC: 66 U/L
ALT SERPL-CCNC: 15 U/L
ANION GAP SERPL CALC-SCNC: 14 MMOL/L
AST SERPL-CCNC: 31 U/L
BILIRUB SERPL-MCNC: 0.4 MG/DL
BUN SERPL-MCNC: 24 MG/DL
CALCIUM SERPL-MCNC: 9.2 MG/DL
CHLORIDE SERPL-SCNC: 105 MMOL/L
CHOLEST SERPL-MCNC: 168 MG/DL
CO2 SERPL-SCNC: 23 MMOL/L
CREAT SERPL-MCNC: 1.8 MG/DL
EGFR: 37 ML/MIN/1.73M2
ESTIMATED AVERAGE GLUCOSE: 128 MG/DL
ESTRADIOL SERPL-MCNC: 11 PG/ML
FSH SERPL-MCNC: 5.2 IU/L
GLUCOSE SERPL-MCNC: 151 MG/DL
HBA1C MFR BLD HPLC: 6.1 %
HCT VFR BLD CALC: 41.5 %
HDLC SERPL-MCNC: 41 MG/DL
HGB BLD-MCNC: 13.5 G/DL
LDLC SERPL CALC-MCNC: 109 MG/DL
MCHC RBC-ENTMCNC: 32.5 G/DL
MCHC RBC-ENTMCNC: 33.5 PG
MCV RBC AUTO: 103 FL
NONHDLC SERPL-MCNC: 127 MG/DL
PLATELET # BLD AUTO: 82 K/UL
PMV BLD AUTO: 0 /100 WBCS
PMV BLD: 10 FL
POTASSIUM SERPL-SCNC: 4.6 MMOL/L
PROT SERPL-MCNC: 7.1 G/DL
RBC # BLD: 4.03 M/UL
RBC # FLD: 14.6 %
SODIUM SERPL-SCNC: 142 MMOL/L
T3 SERPL-MCNC: 107 NG/DL
T4 FREE SERPL-MCNC: 1.4 NG/DL
TRIGL SERPL-MCNC: 91 MG/DL
TSH SERPL-ACNC: 0.85 UIU/ML
VALPROATE SERPL-MCNC: 133 UG/ML
WBC # FLD AUTO: 3.92 K/UL

## 2024-06-01 LAB
24R-OH-CALCIDIOL SERPL-MCNC: 26.9 PG/ML
APPEARANCE: CLEAR
BILIRUBIN URINE: NEGATIVE
BLOOD URINE: NEGATIVE
COLOR: YELLOW
CORTIS SERPL-MCNC: 2 UG/DL
CREAT SPEC-SCNC: 25 MG/DL
DHEA-S SERPL-MCNC: 73.7 UG/DL
GLUCOSE QUALITATIVE U: 500 MG/DL
KETONES URINE: NEGATIVE MG/DL
LEUKOCYTE ESTERASE URINE: NEGATIVE
LH SERPL-ACNC: 1.6 IU/L
MICROALBUMIN 24H UR DL<=1MG/L-MCNC: <1.2 MG/DL
MICROALBUMIN/CREAT 24H UR-RTO: NORMAL MG/G
NITRITE URINE: NEGATIVE
PH URINE: 6
PROLACTIN SERPL-MCNC: 19.5 NG/ML
PROTEIN URINE: NEGATIVE MG/DL
SHBG SERPL-SCNC: 162 NMOL/L
SPECIFIC GRAVITY URINE: 1.01
THYROGLOB AB SERPL-ACNC: <20 IU/ML
THYROPEROXIDASE AB SERPL IA-ACNC: 38.6 IU/ML
UROBILINOGEN URINE: 0.2 MG/DL
VIT B12 SERPL-MCNC: 889 PG/ML

## 2024-06-03 NOTE — BRIEF OPERATIVE NOTE - NSICDXBRIEFPROCEDURE_GEN_ALL_CORE_FT
Last office visit:  12/7/2024  Last Refill:   Return To Clinic: 6/7/2024  Protocol:   
PROCEDURES:  Dental exam, with x-ray imaging, dental cleaning, and restoration 09-Jul-2021 11:08:03  Dea Connolly

## 2024-06-05 LAB
TESTOST FREE SERPL-MCNC: 0.3 PG/ML
TESTOST SERPL-MCNC: 15.9 NG/DL

## 2024-06-08 LAB
MONOMERIC PROLACTIN (ICMA)*: 16.6 NG/ML
PERCENT MACROPROLACTIN: 21 %
PROLACTIN, SERUM (ICMA)*: 20.9 NG/ML

## 2024-07-16 ENCOUNTER — APPOINTMENT (OUTPATIENT)
Dept: NEPHROLOGY | Facility: CLINIC | Age: 34
End: 2024-07-16

## 2024-07-18 ENCOUNTER — APPOINTMENT (OUTPATIENT)
Dept: PEDIATRIC DEVELOPMENTAL SERVICES | Facility: CLINIC | Age: 34
End: 2024-07-18

## 2024-08-22 ENCOUNTER — APPOINTMENT (OUTPATIENT)
Dept: ENDOCRINOLOGY | Facility: CLINIC | Age: 34
End: 2024-08-22

## 2024-09-03 ENCOUNTER — APPOINTMENT (OUTPATIENT)
Dept: NEPHROLOGY | Facility: CLINIC | Age: 34
End: 2024-09-03

## 2024-09-18 NOTE — ED PROVIDER NOTE - PHYSICAL EXAMINATION
Blood pressure in office today was   BP Readings from Last 1 Encounters:   09/18/24 141/82   The goal range for blood pressure is below 130/80.   We will continue to monitor.  Continue lisinopril 5 mg daily.    10 mg dose to much, caused dizziness/vertigo  Continue reduced dose of 5mg daily   HOME BP good    PHYSICAL EXAM: I have reviewed current vital signs.  GENERAL: NAD, well-nourished; well-developed.  HEAD:  Normocephalic, atraumatic.  EYES: EOMI, PERRL, conjunctiva and sclera clear.  ENT: MMM, no erythema/exudates.  NECK: Supple, no JVD.  CHEST/LUNG: Clear to auscultation bilaterally; no wheezes, rales, or rhonchi.  HEART: Regular rate and rhythm, normal S1 and S2; no murmurs, rubs, or gallops.  ABDOMEN: Soft, nontender, nondistended.  EXTREMITIES:  2+ peripheral pulses; no clubbing, cyanosis, or edema.  PSYCH: Cooperative, appropriate, normal mood and affect.  NEUROLOGY: A&O x 3. Motor 5/5. Sensory intact. No focal neurological deficits. CN II - XII intact. (-) dysmetria, facial droop, pronator drift.  SKIN: Warm and dry.

## 2024-10-08 ENCOUNTER — APPOINTMENT (OUTPATIENT)
Dept: NEUROLOGY | Facility: CLINIC | Age: 34
End: 2024-10-08

## 2025-05-15 NOTE — PRE-ANESTHESIA EVALUATION PEDIATRIC - NSANTHINFORMPLANSD_GEN_P_CORE
Onset:  since \"30 weeks\"/ states she is currently 30 weeks    Location/Description:  Vague historian/ difficult to understand and not answering questions directly.  States \"I've been having pains- I couldn't even barely walk up the stairs\". States pain \"up in my butt, up in my vagina, and up in my stomach\". States like \"[place] contractions\". Has not had prenatal care. Reports severe, constant abdominal pain, so 911 advised.     States \"I don't even think I should go visit my son but that's going to look bad on me in court because he's in the system\". Also states she can't come to OB appt today because her Mom does not have keys to lock the door. States \"I don't need no prenatal care. I'll just keep going to the Emergency Department like I have\".     Cramping/Contractions:  as above    Vaginal Drainage/Bleeding:  denies     Fetus Active?  yes    Precipitating Factors:  as above    Pain Scale (1-10), 10 highest: 10/10    LMP: Patient's last menstrual period was 2024 (within days).    EDC:  No VIVIEN on file.    Gestational Age:  Unknown (States 30 weeks)    Blood Type: A Rh Positive    OB History:    OB History    Para Term  AB Living   3 2 2 0 0 2   SAB IAB Ectopic Molar Multiple Live Births   0 0 0 0 0 2   Obstetric Comments   15, 28, 5, moderate-heavy           Recent visits (last 3-4 weeks) for same reason or recent surgery:  no    PLAN:  Call 911    Unsure if patient/caller will follow recommendations.    Reason for Disposition   SEVERE constant abdominal pain (e.g., excruciating) and present > 1 hour    Protocols used: Pregnancy - Labor - [id number]    
Regarding: [place] 33 Year Old Female / 30 Weeks Pregnant Multiple / Whole Body Pain Level 10  ----- Message from Yuridia CHANG sent at 5/15/2025  7:10 AM CDT -----  Patient Name: Lizzy Borjas    Specialist or PCP Name: Alana LEA, Claudine BRITO    Symptoms: [place] 33 Year Old Female / 30 Weeks Pregnant Multiple / Whole Body Pain Level 10    Pregnant (females aged 13-60. If Yes, how long?) : Yes 30 Weeks     Call Back # : [phone]    Which State are you currently located in?: [place]     Name of Clinic Site : [place] [id number]    Call arrived during: After Hours    
parent